# Patient Record
Sex: FEMALE | Race: WHITE | NOT HISPANIC OR LATINO | Employment: FULL TIME | URBAN - METROPOLITAN AREA
[De-identification: names, ages, dates, MRNs, and addresses within clinical notes are randomized per-mention and may not be internally consistent; named-entity substitution may affect disease eponyms.]

---

## 2017-02-22 DIAGNOSIS — Z32.01 POSITIVE PREGNANCY TEST: Primary | ICD-10-CM

## 2017-02-24 DIAGNOSIS — Z32.01 POSITIVE PREGNANCY TEST: ICD-10-CM

## 2017-02-24 LAB — B-HCG SERPL-ACNC: 1256 IU/L

## 2017-02-24 PROCEDURE — 84702 CHORIONIC GONADOTROPIN TEST: CPT | Performed by: OBSTETRICS & GYNECOLOGY

## 2017-02-24 PROCEDURE — 36415 COLL VENOUS BLD VENIPUNCTURE: CPT | Performed by: OBSTETRICS & GYNECOLOGY

## 2017-02-28 ENCOUNTER — TELEPHONE (OUTPATIENT)
Dept: OBGYN | Facility: CLINIC | Age: 37
End: 2017-02-28

## 2017-02-28 NOTE — TELEPHONE ENCOUNTER
Pt calling to discuss what she is going through with dealing with a miscarriage and now has blood in her urine, should she be seen ?  Pt would like to be seen soon can she be worked in?    Please call-     Bessy Harris  [facility] [place]

## 2017-02-28 NOTE — TELEPHONE ENCOUNTER
"Spoke with patient on the phone.    S-(situation): Patient reports she had a miscarriage on 2/19/17. Bleeding continued for about 7 days and became more like spotting on 2/26/17. Patient reports today she was out for a run and returned and noticed blood in her urine when voiding. Patient reports she wasn't sure if it was coming from her vagina or not so she placed a tampon. Patient reports she pushed fluids and voided 2 additional times with tampon in place, continued to see blood in her urine. Patient denies UTI symptoms. Patient denies fever. Patient denies flank pain, reports cramping/low back pain during the course of her miscarriage. Patient denies nausea or vomiting. Patient reports \" looser stools.\"    B-(background): US 2/14 confirmed missed AB    A-(assessment): hematuria per pt    R-(recommendations): Advised patient to be seen by PCP for further evaluation. Monitor for S & S of UTI or kidney infection/stones.    Patient reports understanding and in agreement.    Lor Rand   [facility]  RN        "

## 2017-03-01 ENCOUNTER — OFFICE VISIT (OUTPATIENT)
Dept: FAMILY MEDICINE | Facility: CLINIC | Age: 37
End: 2017-03-01
Payer: COMMERCIAL

## 2017-03-01 VITALS
HEIGHT: 63 IN | RESPIRATION RATE: 18 BRPM | DIASTOLIC BLOOD PRESSURE: 76 MMHG | BODY MASS INDEX: 18.43 KG/M2 | SYSTOLIC BLOOD PRESSURE: 120 MMHG | HEART RATE: 60 BPM | TEMPERATURE: 98.1 F | WEIGHT: 104 LBS

## 2017-03-01 DIAGNOSIS — O02.1 MISSED ABORTION: ICD-10-CM

## 2017-03-01 DIAGNOSIS — R31.9 BLOOD IN URINE: Primary | ICD-10-CM

## 2017-03-01 LAB
ALBUMIN UR-MCNC: NEGATIVE MG/DL
APPEARANCE UR: ABNORMAL
BACTERIA #/AREA URNS HPF: ABNORMAL /HPF
BILIRUB UR QL STRIP: NEGATIVE
COLOR UR AUTO: YELLOW
GLUCOSE UR STRIP-MCNC: NEGATIVE MG/DL
HGB UR QL STRIP: ABNORMAL
KETONES UR STRIP-MCNC: NEGATIVE MG/DL
LEUKOCYTE ESTERASE UR QL STRIP: NEGATIVE
NITRATE UR QL: NEGATIVE
PH UR STRIP: 7 PH (ref 5–7)
RBC #/AREA URNS AUTO: >100 /HPF (ref 0–2)
SP GR UR STRIP: 1.02 (ref 1–1.03)
URN SPEC COLLECT METH UR: ABNORMAL
UROBILINOGEN UR STRIP-ACNC: 0.2 EU/DL (ref 0.2–1)
WBC #/AREA URNS AUTO: ABNORMAL /HPF (ref 0–2)

## 2017-03-01 PROCEDURE — 99213 OFFICE O/P EST LOW 20 MIN: CPT | Performed by: PHYSICIAN ASSISTANT

## 2017-03-01 PROCEDURE — 81001 URINALYSIS AUTO W/SCOPE: CPT | Performed by: PHYSICIAN ASSISTANT

## 2017-03-01 PROCEDURE — 87086 URINE CULTURE/COLONY COUNT: CPT | Performed by: PHYSICIAN ASSISTANT

## 2017-03-01 ASSESSMENT — PAIN SCALES - GENERAL: PAINLEVEL: NO PAIN (0)

## 2017-03-01 NOTE — PATIENT INSTRUCTIONS
Will do urine culture to be sure no bladder infection  Still does not prove whether blood in urine v from miscarriage  Seems most likely from miscarriage - but only way to know would be to get cathed urine sample  Talk to your reproductive endocrinologist on Monday   Let me know if question of blood in urine continues

## 2017-03-01 NOTE — MR AVS SNAPSHOT
After Visit Summary   3/1/2017    María Briones    MRN: [id number]           Patient Information     Date Of Birth          1980        Visit Information        Provider Department      3/1/2017 8:00 AM Fay Petersen PA-C [facility]        Today's Diagnoses     Blood in urine    -  1    Missed           Care Instructions    Will do urine culture to be sure no bladder infection  Still does not prove whether blood in urine v from miscarriage  Seems most likely from miscarriage - but only way to know would be to get cathed urine sample  Talk to your reproductive endocrinologist on Monday   Let me know if question of blood in urine continues         Follow-ups after your visit        Who to contact     If you have questions or need follow up information about today's clinic visit or your schedule please contact [facility] directly at [phone].  Normal or non-critical lab and imaging results will be communicated to you by CoverMyMedshart, letter or phone within 4 business days after the clinic has received the results. If you do not hear from us within 7 days, please contact the clinic through CoverMyMedshart or phone. If you have a critical or abnormal lab result, we will notify you by phone as soon as possible.  Submit refill requests through [employer] or call your pharmacy and they will forward the refill request to us. Please allow 3 business days for your refill to be completed.          Additional Information About Your Visit        MyChart Information     [employer] gives you secure access to your electronic health record. If you see a primary care provider, you can also send messages to your care team and make appointments. If you have questions, please call your primary care clinic.  If you do not have a primary care provider, please call [phone] and they will assist you.        Care EveryWhere ID     This is your Care EveryWhere ID. This could be used  "by other organizations to access your [place] medical records  [id number]        Your Vitals Were     Pulse Temperature Respirations Height Last Period BMI (Body Mass Index)    60 98.1  F (36.7  C) (Tympanic) 18 5' 3.39\" (1.61 m) (LMP Unknown) 18.2 kg/m2       Blood Pressure from Last 3 Encounters:   03/01/17 120/76   10/24/16 127/79   11/23/15 127/76    Weight from Last 3 Encounters:   03/01/17 104 lb (47.2 kg)   10/24/16 105 lb 12.8 oz (48 kg)   11/23/15 106 lb 6.4 oz (48.3 kg)              We Performed the Following     *UA reflex to Microscopic and Culture ([facility] (except [place] and [place])     Urine Culture Aerobic Bacterial     Urine Microscopic        Primary Care Provider Office Phone # Fax #    Jayshree Garcia [phone]       [facility] [address] [facility] [place] [address]        Thank you!     Thank you for choosing [facility]  for your care. Our goal is always to provide you with excellent care. Hearing back from our patients is one way we can continue to improve our services. Please take a few minutes to complete the written survey that you may receive in the mail after your visit with us. Thank you!             Your Updated Medication List - Protect others around you: Learn how to safely use, store and throw away your medicines at www.disposemymeds.org.          This list is accurate as of: 3/1/17  8:36 AM.  Always use your most recent med list.                   Brand Name Dispense Instructions for use    ibuprofen 400 MG tablet    ADVIL/MOTRIN    60 tablet    Take 1-2 tablets (400-800 mg) by mouth every 6 hours as needed for other (cramping)       prenatal multivitamin  plus iron 27-0.8 MG Tabs per tablet      Take 1 tablet by mouth daily         "

## 2017-03-01 NOTE — PROGRESS NOTES
"  SUBJECTIVE:                                                    María Briones is a 36 year old female who presents to clinic today for the following health issues:      URINARY TRACT SYMPTOMS     Onset: Yesterday 2/28/17    Description:   Painful urination (Dysuria): no   Blood in urine (Hematuria): YES  Delay in urine (Hesitency): no     Intensity: mild    Progression of Symptoms:  Improving since yesterday    Accompanying Signs & Symptoms:  Fever/chills: no   Flank pain no  Nausea and vomiting: no   Any vaginal symptoms: none  Abdominal/Pelvic Pain: YES- Cramping   History:   History of frequent UTI's: YES- in the past  History of kidney stones: no   Sexually Active: YES  Possibility of pregnancy: Had a miscarriage last week    Precipitating factors:   NA         Therapies Tried and outcome:  Increase fluid intake    Has been working with reproductive endocrinologist.  Embryo transfer in Jan.  Thinks her missed ab was ~7 wks.  From telephone to OB yesterday: \"Patient reports she had a miscarriage on 2/19/17. Bleeding continued for about 7 days and became more like spotting on 2/26/17. Patient reports today she was out for a run and returned and noticed blood in her urine when voiding. Patient reports she wasn't sure if it was coming from her vagina or not so she placed a tampon. Patient reports she pushed fluids and voided 2 additional times with tampon in place, continued to see blood in her urine. Patient denies UTI symptoms. Patient denies fever. Patient denies flank pain, reports cramping/low back pain during the course of her miscarriage. Patient denies nausea or vomiting. Patient reports \" looser stools.\"    Is getting some back pain and cramping, less this week than last.  No urinary symptoms aside from ? Blood.  History of occasional UTI - last maybe 5 yrs ago, more freq in past.  No personal or fam history of kidney stones.      Problem list and histories reviewed & adjusted, as indicated.  Additional " history: as documented    Patient Active Problem List   Diagnosis     Irregular periods     Dysmenorrhea     Female infertility     Past Surgical History   Procedure Laterality Date     C oral surgery procedure       wisdom teeth     Laparoscopy diagnostic (gyn)  4/15/2014     Procedure: Operative Laparoscopy, Excision Cauterization Of Endometriosis, Chromopertubation ;  Surgeon: Mohamud Osorio MD;  [facility]     Laparoscopic ablation endometriosis  4/15/2014     Procedure: LAPAROSCOPIC ABLATION ENDOMETRIOSIS;  Surgeon: Mohamud Osorio MD;  [facility]     Laparoscopic tubal dye study  4/15/2014     Procedure: LAPAROSCOPIC TUBAL DYE STUDY;  Surgeon: Mohamud Osorio MD;  [facility]      section N/A 10/12/2015     Procedure:  SECTION;  Surgeon: Jayshree Garcia MD;  Location: [facility]       Social History   Substance Use Topics     Smoking status: Never Smoker     Smokeless tobacco: Never Used     Alcohol use Yes      Comment: occas- quit with pregnancy     Family History   Problem Relation Age of Onset     Lipids Father      Hypertension Father      Cardiovascular Father      A fib     CANCER Maternal Grandmother      ovarian      DIABETES Maternal Grandfather      HEART DISEASE Maternal Grandfather      CANCER Maternal Grandfather      skin     Cardiovascular Maternal Grandfather      MI     Thyroid Disease Brother      ?     CANCER Mother      leukemia     Bleeding Disorder Mother      anemia     Depression Mother      Cardiovascular Paternal Grandfather      MI     Genitourinary Problems Paternal Grandmother      kidney removed           Reviewed and updated as needed this visit by clinical staff       Reviewed and updated as needed this visit by Provider         ROS:  Constitutional, , musculoskeletal, and psych systems are negative, except as otherwise noted.    OBJECTIVE:                                                    /76 (BP Location: Right arm, Patient Position:  "Chair, Cuff Size: Adult Regular)  Pulse 60  Temp 98.1  F (36.7  C) (Tympanic)  Resp 18  Ht 5' 3.39\" (1.61 m)  Wt 104 lb (47.2 kg)  LMP  (LMP Unknown)  BMI 18.2 kg/m2  Body mass index is 18.2 kg/(m^2).  GENERAL: healthy, alert and no distress    UA: RBC >100, otherwise neg     ASSESSMENT/PLAN:                                                        ICD-10-CM    1. ? Blood in urine R31.9 *UA reflex to Microscopic and Culture ([facility] (except [place] and [place])     Urine Microscopic     Urine Culture Aerobic Bacterial   2. Missed  O02.1        Patient Instructions   Will do urine culture to be sure no bladder infection  Still does not prove whether blood in urine v from miscarriage  Seems most likely from miscarriage - but only way to know would be to get cathed urine sample  Talk to your reproductive endocrinologist on Monday   Let me know if question of blood in urine continues       Fay Petersen, PADaisyC  [facility]  "

## 2017-03-03 LAB
BACTERIA SPEC CULT: NORMAL
MICRO REPORT STATUS: NORMAL
SPECIMEN SOURCE: NORMAL

## 2017-03-03 NOTE — PROGRESS NOTES
CMA- order future UA with micro reflex.    Juana Daniel,    Your urine culture showed NO infection.  I still the blood is likely residual from your miscarriage, rather than truly in/from your urine.  Discuss with your reproductive endocrinologist.  We can repeat urine test in a few weeks to prove no further blood in urine - space well apart from your periods.     Please call clinic at [phone] if you have questions.    Fay Petersen PA-C

## 2017-03-20 DIAGNOSIS — Z32.01 PREGNANCY TEST-POSITIVE: Primary | ICD-10-CM

## 2017-03-20 LAB — B-HCG SERPL-ACNC: 67 IU/L (ref 0–5)

## 2017-03-20 PROCEDURE — 84702 CHORIONIC GONADOTROPIN TEST: CPT

## 2017-03-20 PROCEDURE — 36415 COLL VENOUS BLD VENIPUNCTURE: CPT

## 2017-04-03 DIAGNOSIS — Z32.00 PREGNANCY EXAMINATION OR TEST, PREGNANCY UNCONFIRMED: Primary | ICD-10-CM

## 2017-04-03 LAB — B-HCG SERPL-ACNC: <1 IU/L (ref 0–5)

## 2017-04-03 PROCEDURE — 36415 COLL VENOUS BLD VENIPUNCTURE: CPT | Performed by: FAMILY MEDICINE

## 2017-04-03 PROCEDURE — 84702 CHORIONIC GONADOTROPIN TEST: CPT | Performed by: FAMILY MEDICINE

## 2017-04-28 ENCOUNTER — OFFICE VISIT (OUTPATIENT)
Dept: FAMILY MEDICINE | Facility: CLINIC | Age: 37
End: 2017-04-28
Payer: COMMERCIAL

## 2017-04-28 VITALS
OXYGEN SATURATION: 99 % | HEIGHT: 63 IN | BODY MASS INDEX: 19.35 KG/M2 | DIASTOLIC BLOOD PRESSURE: 57 MMHG | SYSTOLIC BLOOD PRESSURE: 137 MMHG | HEART RATE: 67 BPM | WEIGHT: 109.2 LBS | TEMPERATURE: 99.4 F

## 2017-04-28 DIAGNOSIS — N63.15 BREAST LUMP ON RIGHT SIDE AT 12 O'CLOCK POSITION: Primary | ICD-10-CM

## 2017-04-28 DIAGNOSIS — N63.24 BREAST LUMP ON LEFT SIDE AT 7 O'CLOCK POSITION: ICD-10-CM

## 2017-04-28 PROCEDURE — 99213 OFFICE O/P EST LOW 20 MIN: CPT | Performed by: INTERNAL MEDICINE

## 2017-04-28 NOTE — PROGRESS NOTES
"  SUBJECTIVE:                                                    María Briones is a 36 year old female who presents to clinic today for the following health issues:  Chief Complaint   Patient presents with     Mass     x 2.5 weeks, dense tissue in right breast      Breast change       Duration: x 2.5 weeks     Description (location/character/radiation): Patient reports noticing a difference when doing self exam -  right breast, at 12 o'clock, breast tissue seems more dense, no lump, no pain    Intensity:  mild    Accompanying signs and symptoms: none     History (similar episodes/previous evaluation): None    Precipitating or alleviating factors: None    Therapies tried and outcome: None     María is currently undergoing fertility treatment and wonders if this may be contributing.       Problem list and histories reviewed & adjusted, as indicated.  Additional history: as documented    Current Outpatient Prescriptions   Medication Sig Dispense Refill     Desogestrel-Ethinyl Estradiol (RECLIPSEN PO)        Prenatal Vit-Fe Fumarate-FA (PRENATAL MULTIVITAMIN  PLUS IRON) 27-0.8 MG TABS Take 1 tablet by mouth daily       ibuprofen (ADVIL,MOTRIN) 400 MG tablet Take 1-2 tablets (400-800 mg) by mouth every 6 hours as needed for other (cramping) (Patient not taking: Reported on 3/1/2017) 60 tablet 0       Reviewed and updated as needed this visit by clinical staff  Tobacco  Allergies  Med Hx  Surg Hx  Fam Hx  Soc Hx      Reviewed and updated as needed this visit by Provider           OBJECTIVE:                                                    /57 (BP Location: Left arm, Patient Position: Chair, Cuff Size: Adult Regular)  Pulse 67  Temp 99.4  F (37.4  C) (Tympanic)  Ht 5' 3\" (1.6 m)  Wt 109 lb 3.2 oz (49.5 kg)  SpO2 99%  BMI 19.34 kg/m2  Body mass index is 19.34 kg/(m^2).  GENERAL: healthy, alert and no distress  BREAST: thickened tissues in right breast at 12 o'clock and left breast at 7 o'clock both a " couple cm in size and adjacent to the nipple and no tenderness or nipple discharge and no palpable axillary masses or adenopathy         ASSESSMENT/PLAN:                                                        1. Breast lump on right side at 12 o'clock position  2. Breast lump on left side at 7 o'clock position    María presents with two breast lumps as noted above.  Will proceed with mammogram and ultrasound of both areas.      - MA Diagnostic Digital Bilateral; Future  - US Breast Bilateral Limited 1-3 Quadrants; Future      NohemyWilla Flanagan MD  [facility]

## 2017-04-28 NOTE — NURSING NOTE
"Chief Complaint   Patient presents with     Mass     x 2.5 weeks, dense tissue in right breast        Initial /57 (BP Location: Left arm, Patient Position: Chair, Cuff Size: Adult Regular)  Pulse 67  Temp 99.4  F (37.4  C) (Tympanic)  Ht 5' 3\" (1.6 m)  Wt 109 lb 3.2 oz (49.5 kg)  SpO2 99%  BMI 19.34 kg/m2 Estimated body mass index is 19.34 kg/(m^2) as calculated from the following:    Height as of this encounter: 5' 3\" (1.6 m).    Weight as of this encounter: 109 lb 3.2 oz (49.5 kg).  Medication Reconciliation: complete   Radha GE CMA (AAMA)    "

## 2017-04-28 NOTE — MR AVS SNAPSHOT
After Visit Summary   4/28/2017    María Briones    MRN: [id number]           Patient Information     Date Of Birth          1980        Visit Information        Provider Department      4/28/2017 11:20 AM Elian Flanagan MD [facility]        Today's Diagnoses     Breast lump on right side at 12 o'clock position    -  1    Breast lump on left side at 7 o'clock position           Follow-ups after your visit        Future tests that were ordered for you today     Open Future Orders        Priority Expected Expires Ordered    MA Diagnostic Digital Bilateral Routine  4/28/2018 4/28/2017    US Breast Bilateral Limited 1-3 Quadrants Routine  4/28/2018 4/28/2017            Who to contact     If you have questions or need follow up information about today's clinic visit or your schedule please contact [facility] directly at [phone].  Normal or non-critical lab and imaging results will be communicated to you by MyChart, letter or phone within 4 business days after the clinic has received the results. If you do not hear from us within 7 days, please contact the clinic through [employer] or phone. If you have a critical or abnormal lab result, we will notify you by phone as soon as possible.  Submit refill requests through [employer] or call your pharmacy and they will forward the refill request to us. Please allow 3 business days for your refill to be completed.          Additional Information About Your Visit        MyChart Information     [employer] gives you secure access to your electronic health record. If you see a primary care provider, you can also send messages to your care team and make appointments. If you have questions, please call your primary care clinic.  If you do not have a primary care provider, please call [phone] and they will assist you.        Care EveryWhere ID     This is your Care EveryWhere ID. This could be used by other organizations to access your  "[place] medical records  [id number]        Your Vitals Were     Pulse Temperature Height Pulse Oximetry BMI (Body Mass Index)       67 99.4  F (37.4  C) (Tympanic) 5' 3\" (1.6 m) 99% 19.34 kg/m2        Blood Pressure from Last 3 Encounters:   04/28/17 137/57   03/01/17 120/76   10/24/16 127/79    Weight from Last 3 Encounters:   04/28/17 109 lb 3.2 oz (49.5 kg)   03/01/17 104 lb (47.2 kg)   10/24/16 105 lb 12.8 oz (48 kg)               Primary Care Provider Office Phone # Fax #    Jayshree Garcia [phone]       [facility] [address]  [facility] [place] [address]        Thank you!     Thank you for choosing [facility]  for your care. Our goal is always to provide you with excellent care. Hearing back from our patients is one way we can continue to improve our services. Please take a few minutes to complete the written survey that you may receive in the mail after your visit with us. Thank you!             Your Updated Medication List - Protect others around you: Learn how to safely use, store and throw away your medicines at www.disposemymeds.org.          This list is accurate as of: 4/28/17 11:46 AM.  Always use your most recent med list.                   Brand Name Dispense Instructions for use    ibuprofen 400 MG tablet    ADVIL/MOTRIN    60 tablet    Take 1-2 tablets (400-800 mg) by mouth every 6 hours as needed for other (cramping)       prenatal multivitamin  plus iron 27-0.8 MG Tabs per tablet      Take 1 tablet by mouth daily       RECLIPSEN PO            "

## 2017-05-09 ENCOUNTER — HOSPITAL ENCOUNTER (OUTPATIENT)
Dept: ULTRASOUND IMAGING | Facility: CLINIC | Age: 37
End: 2017-05-09
Attending: INTERNAL MEDICINE
Payer: COMMERCIAL

## 2017-05-09 ENCOUNTER — HOSPITAL ENCOUNTER (OUTPATIENT)
Dept: MAMMOGRAPHY | Facility: CLINIC | Age: 37
Discharge: HOME | End: 2017-05-09
Attending: INTERNAL MEDICINE | Admitting: INTERNAL MEDICINE
Payer: COMMERCIAL

## 2017-05-09 DIAGNOSIS — N63.15 BREAST LUMP ON RIGHT SIDE AT 12 O'CLOCK POSITION: ICD-10-CM

## 2017-05-09 DIAGNOSIS — N63.24 BREAST LUMP ON LEFT SIDE AT 7 O'CLOCK POSITION: ICD-10-CM

## 2017-05-09 PROCEDURE — G0204 DX MAMMO INCL CAD BI: HCPCS

## 2017-05-09 PROCEDURE — 76642 ULTRASOUND BREAST LIMITED: CPT | Mod: 50

## 2017-12-22 DIAGNOSIS — Z32.01 PREGNANCY EXAMINATION OR TEST, POSITIVE RESULT: Primary | ICD-10-CM

## 2017-12-27 ENCOUNTER — OFFICE VISIT (OUTPATIENT)
Dept: DERMATOLOGY | Facility: CLINIC | Age: 37
End: 2017-12-27
Payer: COMMERCIAL

## 2017-12-27 VITALS — OXYGEN SATURATION: 99 % | HEART RATE: 89 BPM | DIASTOLIC BLOOD PRESSURE: 73 MMHG | SYSTOLIC BLOOD PRESSURE: 128 MMHG

## 2017-12-27 DIAGNOSIS — B36.0 TV (TINEA VERSICOLOR): ICD-10-CM

## 2017-12-27 DIAGNOSIS — Z32.01 PREGNANCY EXAMINATION OR TEST, POSITIVE RESULT: ICD-10-CM

## 2017-12-27 DIAGNOSIS — L81.4 LENTIGO: ICD-10-CM

## 2017-12-27 DIAGNOSIS — D22.9 BENIGN NEVUS: ICD-10-CM

## 2017-12-27 DIAGNOSIS — D48.5 NEOPLASM OF UNCERTAIN BEHAVIOR OF SKIN: Primary | ICD-10-CM

## 2017-12-27 DIAGNOSIS — L81.1 MELASMA: ICD-10-CM

## 2017-12-27 LAB — B-HCG SERPL-ACNC: 14 IU/L (ref 0–5)

## 2017-12-27 PROCEDURE — 36415 COLL VENOUS BLD VENIPUNCTURE: CPT | Performed by: OBSTETRICS & GYNECOLOGY

## 2017-12-27 PROCEDURE — 84702 CHORIONIC GONADOTROPIN TEST: CPT | Performed by: OBSTETRICS & GYNECOLOGY

## 2017-12-27 PROCEDURE — 88305 TISSUE EXAM BY PATHOLOGIST: CPT | Performed by: PHYSICIAN ASSISTANT

## 2017-12-27 PROCEDURE — 11100 HC BIOPSY SKIN/SUBQ/MUC MEM, SINGLE LESION: CPT | Performed by: PHYSICIAN ASSISTANT

## 2017-12-27 PROCEDURE — 99203 OFFICE O/P NEW LOW 30 MIN: CPT | Mod: 25 | Performed by: PHYSICIAN ASSISTANT

## 2017-12-27 RX ORDER — CICLOPIROX OLAMINE 7.7 MG/G
CREAM TOPICAL
Qty: 30 G | Refills: 4 | Status: SHIPPED | OUTPATIENT
Start: 2017-12-27 | End: 2018-03-30

## 2017-12-27 NOTE — LETTER
2017         RE: María Briones  [address] [address] [address]        Dear Colleague,    Thank you for referring your patient, María Briones, to the [facility]. Please see a copy of my visit note below.    María Briones is a 37 year old year old female patient here today for skin check.  Patient reports that she has had a mole on right forearm that will get irritated with her daughter picking at spot. She also developed brownish pigment on forehead during her last pregnancy. She also notes a rash on her abdomen, but it doesn't itch. She is currently pregnant. Patient has no other skin complaints today.  Remainder of the HPI, Meds, PMH, Allergies, FH, and SH was reviewed in chart.    Pertinent Hx:  No personal history of skin cancer.   Past Medical History:   Diagnosis Date     Chickenpox      Palpitations 2008     Urinary tract bacterial infections     as chold       Past Surgical History:   Procedure Laterality Date     C ORAL SURGERY PROCEDURE      wisdom teeth      SECTION N/A 10/12/2015    Procedure:  SECTION;  Surgeon: Jayshree Garcia MD;  Location: [facility]     LAPAROSCOPIC ABLATION ENDOMETRIOSIS  4/15/2014    Procedure: LAPAROSCOPIC ABLATION ENDOMETRIOSIS;  Surgeon: Mohamud Osorio MD;  [facility]     LAPAROSCOPIC TUBAL DYE STUDY  4/15/2014    Procedure: LAPAROSCOPIC TUBAL DYE STUDY;  Surgeon: Mohamud Osorio MD;  [facility]     LAPAROSCOPY DIAGNOSTIC (GYN)  4/15/2014    Procedure: Operative Laparoscopy, Excision Cauterization Of Endometriosis, Chromopertubation ;  Surgeon: Mohamud Osorio MD;  [facility]        Family History   Problem Relation Age of Onset     Lipids Father      Hypertension Father      Cardiovascular Father      A fib     CANCER Maternal Grandmother      ovarian      DIABETES Maternal Grandfather      HEART DISEASE Maternal Grandfather      CANCER Maternal Grandfather      skin     Cardiovascular Maternal  Grandfather      MI     Skin Cancer Maternal Grandfather      Thyroid Disease Brother      ?     CANCER Mother      leukemia     Bleeding Disorder Mother      anemia     Depression Mother      Cardiovascular Paternal Grandfather      MI     Genitourinary Problems Paternal Grandmother      kidney removed     Skin Cancer Maternal Aunt        Social History     Social History     Marital status:      Spouse name: N/A     Number of children: N/A     Years of education: N/A     Occupational History     Not on file.     Social History Main Topics     Smoking status: Never Smoker     Smokeless tobacco: Never Used     Alcohol use Yes      Comment: occas- quit with pregnancy     Drug use: No     Sexual activity: Yes     Partners: Male      Comment:  since 2005     Other Topics Concern     Parent/Sibling W/ Cabg, Mi Or Angioplasty Before 65f 55m? No     Social History Narrative       Outpatient Encounter Prescriptions as of 12/27/2017   Medication Sig Dispense Refill     Estradiol (ESTRACE PO)        ciclopirox (LOPROX) 0.77 % cream Apply twice daily to rash on abdomen for 2-3 weeks. 30 g 4     Prenatal Vit-Fe Fumarate-FA (PRENATAL MULTIVITAMIN  PLUS IRON) 27-0.8 MG TABS Take 1 tablet by mouth daily       Desogestrel-Ethinyl Estradiol (RECLIPSEN PO)        ibuprofen (ADVIL,MOTRIN) 400 MG tablet Take 1-2 tablets (400-800 mg) by mouth every 6 hours as needed for other (cramping) (Patient not taking: Reported on 3/1/2017) 60 tablet 0     No facility-administered encounter medications on file as of 12/27/2017.              Review Of Systems  Skin: As above  Eyes: negative  Ears/Nose/Throat: negative  Respiratory: No shortness of breath, dyspnea on exertion, cough, or hemoptysis  Cardiovascular: negative  Gastrointestinal: negative  Genitourinary: negative  Musculoskeletal: negative  Neurologic: negative  Psychiatric: negative  Hematologic/Lymphatic/Immunologic: negative  Endocrine: negative      O:   NAD, WDWN,  Alert & Oriented, Mood & Affect wnl, Vitals stable   Here today alone   /73  Pulse 89  SpO2 99%   General appearance normal   Vitals stable   Alert, oriented and in no acute distress      Brown papules and macules with regular pigment network and borders on torso and extremities   Brown macules on face and arms   Pink scaly thin well demarcated plaque   Thin brown patches on bilateral side of forehead  0.3 flesh colored papule on right elbow       The remainder of the detailed exam was unremarkable; the following areas were examined:  scalp/hair, conjunctiva/lids, face, neck, lips/teeth, oral mucosa/gingiva, chest, back, abdomen, buttocks, digits/nails, RUE, LUE, RLE, LLE.      Eyes: Conjunctivae/lids:Normal     ENT: Lips    MSK:Normal    Cardiovascular: peripheral edema none    Pulm: Breathing Normal    Neuro/Psych: Orientation:Normal; Mood/Affect:Normal    A/P:  1. R/O dermal nevus on right elbow  TANGENTIAL BIOPSY SENT OUT:  After consent, anesthesia with Lidocaine without epi and prep, tangential excision performed and specimen sent out for permanent section histology.  No complications and routine wound care. Patient told to call our office in 1-2 weeks for result.      2. Pityriasis Versicolor  Apply ciclopirox cream twice daily rash on abdomen for 2-3 week.   3. Melasma   Discussed to wear daily sunscreen.   If wanting after she is done with pregnancy and discuss creams to help lighten brown areas on skin.   4. Benign nevi, lentigo   BENIGN LESIONS DISCUSSED WITH PATIENT:  I discussed the specifics of tumor, prognosis, and genetics of benign lesions.  I explained that treatment of these lesions would be purely cosmetic and not medically neccessary.  I discussed with patient different removal options including excision, cautery and /or laser.      Nature and genetics of benign skin lesions dicussed with patient.  Signs and Symptoms of skin cancer discussed with patient.  ABCDEs of melanoma reviewed  with patient.  Patient encouraged to perform monthly skin exams.  UV precautions reviewed with patient.  Risks of non-melanoma skin cancer discussed with patient   Return to clinic one year or sooner if needed.     Again, thank you for allowing me to participate in the care of your patient.        Sincerely,        Susan Ballard PA-C

## 2017-12-27 NOTE — PATIENT INSTRUCTIONS
Wound Care Instructions     FOR SUPERFICIAL WOUNDS     [facility] [phone]    [facility] [phone]                     AFTER 24 HOURS YOU SHOULD REMOVE THE BANDAGE AND BEGIN DAILY DRESSING CHANGES AS FOLLOWS:     1) Remove Dressing.     2) Clean and dry the area with tap water using a Q-tip or sterile gauze pad.     3) Apply Vaseline, Aquaphor, Polysporin ointment or Bacitracin ointment over entire wound.  Do NOT use Neosporin ointment.     4) Cover the wound with a band-aid, or a sterile non-stick gauze pad and micropore paper tape      REPEAT THESE INSTRUCTIONS AT LEAST ONCE A DAY UNTIL THE WOUND HAS COMPLETELY HEALED.    It is an old wives tale that a wound heals better when it is exposed to air and allowed to dry out. The wound will heal faster with a better cosmetic result if it is kept moist with ointment and covered with a bandage.    **Do not let the wound dry out.**      Supplies Needed:      *Cotton tipped applicators (Q-tips)    *Polysporin Ointment or Bacitracin Ointment (NOT NEOSPORIN)    *Band-aids or non-stick gauze pads and micropore paper tape.      PATIENT INFORMATION:    During the healing process you will notice a number of changes. All wounds develop a small halo of redness surrounding the wound.  This means healing is occurring. Severe itching with extensive redness usually indicates sensitivity to the ointment or bandage tape used to dress the wound.  You should call our office if this develops.      Swelling  and/or discoloration around your surgical site is common, particularly when performed around the eye.    All wounds normally drain.  The larger the wound the more drainage there will be.  After 7-10 days, you will notice the wound beginning to shrink and new skin will begin to grow.  The wound is healed when you can see skin has formed over the entire area.  A healed wound has a healthy, shiny look to the surface and is red to dark pink in color to  normalize.  Wounds may take approximately 4-6 weeks to heal.  Larger wounds may take 6-8 weeks.  After the wound is healed you may discontinue dressing changes.    You may experience a sensation of tightness as your wound heals. This is normal and will gradually subside.    Your healed wound may be sensitive to temperature changes. This sensitivity improves with time, but if you re having a lot of discomfort, try to avoid temperature extremes.    Patients frequently experience itching after their wound appears to have healed because of the continue healing under the skin.  Plain Vaseline will help relieve the itching.        POSSIBLE COMPLICATIONS    BLEEDIN. Leave the bandage in place.  2. Use tightly rolled up gauze or a cloth to apply direct pressure over the bandage for 30  minutes.  3. Reapply pressure for an additional 30 minutes if necessary  4. Use additional gauze and tape to maintain pressure once the bleeding has stopped.

## 2017-12-27 NOTE — PROGRESS NOTES
María Briones is a 37 year old year old female patient here today for skin check.  Patient reports that she has had a mole on right forearm that will get irritated with her daughter picking at spot. She also developed brownish pigment on forehead during her last pregnancy. She also notes a rash on her abdomen, but it doesn't itch. She is currently pregnant. Patient has no other skin complaints today.  Remainder of the HPI, Meds, PMH, Allergies, FH, and SH was reviewed in chart.    Pertinent Hx:  No personal history of skin cancer.   Past Medical History:   Diagnosis Date     Chickenpox      Palpitations 2008     Urinary tract bacterial infections     as chold       Past Surgical History:   Procedure Laterality Date     C ORAL SURGERY PROCEDURE      wisdom teeth      SECTION N/A 10/12/2015    Procedure:  SECTION;  Surgeon: Jayshree Garcia MD;  Location: [facility]     LAPAROSCOPIC ABLATION ENDOMETRIOSIS  4/15/2014    Procedure: LAPAROSCOPIC ABLATION ENDOMETRIOSIS;  Surgeon: Mohamud Osorio MD;  Location: [facility]     LAPAROSCOPIC TUBAL DYE STUDY  4/15/2014    Procedure: LAPAROSCOPIC TUBAL DYE STUDY;  Surgeon: Mohamud Osorio MD;  Location: [facility]     LAPAROSCOPY DIAGNOSTIC (GYN)  4/15/2014    Procedure: Operative Laparoscopy, Excision Cauterization Of Endometriosis, Chromopertubation ;  Surgeon: Mohamud Osorio MD;  Location: [facility] OR        Family History   Problem Relation Age of Onset     Lipids Father      Hypertension Father      Cardiovascular Father      A fib     CANCER Maternal Grandmother      ovarian      DIABETES Maternal Grandfather      HEART DISEASE Maternal Grandfather      CANCER Maternal Grandfather      skin     Cardiovascular Maternal Grandfather      MI     Skin Cancer Maternal Grandfather      Thyroid Disease Brother      ?     CANCER Mother      leukemia     Bleeding Disorder Mother      anemia     Depression Mother      Cardiovascular Paternal Grandfather      MI      Genitourinary Problems Paternal Grandmother      kidney removed     Skin Cancer Maternal Aunt        Social History     Social History     Marital status:      Spouse name: N/A     Number of children: N/A     Years of education: N/A     Occupational History     Not on file.     Social History Main Topics     Smoking status: Never Smoker     Smokeless tobacco: Never Used     Alcohol use Yes      Comment: occas- quit with pregnancy     Drug use: No     Sexual activity: Yes     Partners: Male      Comment:  since 2005     Other Topics Concern     Parent/Sibling W/ Cabg, Mi Or Angioplasty Before 65f 55m? No     Social History Narrative       Outpatient Encounter Prescriptions as of 12/27/2017   Medication Sig Dispense Refill     Estradiol (ESTRACE PO)        ciclopirox (LOPROX) 0.77 % cream Apply twice daily to rash on abdomen for 2-3 weeks. 30 g 4     Prenatal Vit-Fe Fumarate-FA (PRENATAL MULTIVITAMIN  PLUS IRON) 27-0.8 MG TABS Take 1 tablet by mouth daily       Desogestrel-Ethinyl Estradiol (RECLIPSEN PO)        ibuprofen (ADVIL,MOTRIN) 400 MG tablet Take 1-2 tablets (400-800 mg) by mouth every 6 hours as needed for other (cramping) (Patient not taking: Reported on 3/1/2017) 60 tablet 0     No facility-administered encounter medications on file as of 12/27/2017.              Review Of Systems  Skin: As above  Eyes: negative  Ears/Nose/Throat: negative  Respiratory: No shortness of breath, dyspnea on exertion, cough, or hemoptysis  Cardiovascular: negative  Gastrointestinal: negative  Genitourinary: negative  Musculoskeletal: negative  Neurologic: negative  Psychiatric: negative  Hematologic/Lymphatic/Immunologic: negative  Endocrine: negative      O:   NAD, WDWN, Alert & Oriented, Mood & Affect wnl, Vitals stable   Here today alone   /73  Pulse 89  SpO2 99%   General appearance normal   Vitals stable   Alert, oriented and in no acute distress      Brown papules and macules with regular pigment  network and borders on torso and extremities   Brown macules on face and arms   Pink scaly thin well demarcated plaque   Thin brown patches on bilateral side of forehead  0.3 flesh colored papule on right elbow       The remainder of the detailed exam was unremarkable; the following areas were examined:  scalp/hair, conjunctiva/lids, face, neck, lips/teeth, oral mucosa/gingiva, chest, back, abdomen, buttocks, digits/nails, RUE, LUE, RLE, LLE.      Eyes: Conjunctivae/lids:Normal     ENT: Lips    MSK:Normal    Cardiovascular: peripheral edema none    Pulm: Breathing Normal    Neuro/Psych: Orientation:Normal; Mood/Affect:Normal    A/P:  1. R/O dermal nevus on right elbow  TANGENTIAL BIOPSY SENT OUT:  After consent, anesthesia with Lidocaine without epi and prep, tangential excision performed and specimen sent out for permanent section histology.  No complications and routine wound care. Patient told to call our office in 1-2 weeks for result.      2. Pityriasis Versicolor  Apply ciclopirox cream twice daily rash on abdomen for 2-3 week.   3. Melasma   Discussed to wear daily sunscreen.   If wanting after she is done with pregnancy and discuss creams to help lighten brown areas on skin.   4. Benign nevi, lentigo   BENIGN LESIONS DISCUSSED WITH PATIENT:  I discussed the specifics of tumor, prognosis, and genetics of benign lesions.  I explained that treatment of these lesions would be purely cosmetic and not medically neccessary.  I discussed with patient different removal options including excision, cautery and /or laser.      Nature and genetics of benign skin lesions dicussed with patient.  Signs and Symptoms of skin cancer discussed with patient.  ABCDEs of melanoma reviewed with patient.  Patient encouraged to perform monthly skin exams.  UV precautions reviewed with patient.  Risks of non-melanoma skin cancer discussed with patient   Return to clinic one year or sooner if needed.

## 2017-12-27 NOTE — MR AVS SNAPSHOT
After Visit Summary   12/27/2017    María Brionse    MRN: [id number]           Patient Information     Date Of Birth          1980        Visit Information        Provider Department      12/27/2017 8:20 AM Susan Medeiros PA-C [facility]        Care Instructions        Wound Care Instructions     FOR SUPERFICIAL WOUNDS     [facility] [phone]    [facility] [phone]                     AFTER 24 HOURS YOU SHOULD REMOVE THE BANDAGE AND BEGIN DAILY DRESSING CHANGES AS FOLLOWS:     1) Remove Dressing.     2) Clean and dry the area with tap water using a Q-tip or sterile gauze pad.     3) Apply Vaseline, Aquaphor, Polysporin ointment or Bacitracin ointment over entire wound.  Do NOT use Neosporin ointment.     4) Cover the wound with a band-aid, or a sterile non-stick gauze pad and micropore paper tape      REPEAT THESE INSTRUCTIONS AT LEAST ONCE A DAY UNTIL THE WOUND HAS COMPLETELY HEALED.    It is an old wives tale that a wound heals better when it is exposed to air and allowed to dry out. The wound will heal faster with a better cosmetic result if it is kept moist with ointment and covered with a bandage.    **Do not let the wound dry out.**      Supplies Needed:      *Cotton tipped applicators (Q-tips)    *Polysporin Ointment or Bacitracin Ointment (NOT NEOSPORIN)    *Band-aids or non-stick gauze pads and micropore paper tape.      PATIENT INFORMATION:    During the healing process you will notice a number of changes. All wounds develop a small halo of redness surrounding the wound.  This means healing is occurring. Severe itching with extensive redness usually indicates sensitivity to the ointment or bandage tape used to dress the wound.  You should call our office if this develops.      Swelling  and/or discoloration around your surgical site is common, particularly when performed around the eye.    All wounds normally drain.  The larger the  wound the more drainage there will be.  After 7-10 days, you will notice the wound beginning to shrink and new skin will begin to grow.  The wound is healed when you can see skin has formed over the entire area.  A healed wound has a healthy, shiny look to the surface and is red to dark pink in color to normalize.  Wounds may take approximately 4-6 weeks to heal.  Larger wounds may take 6-8 weeks.  After the wound is healed you may discontinue dressing changes.    You may experience a sensation of tightness as your wound heals. This is normal and will gradually subside.    Your healed wound may be sensitive to temperature changes. This sensitivity improves with time, but if you re having a lot of discomfort, try to avoid temperature extremes.    Patients frequently experience itching after their wound appears to have healed because of the continue healing under the skin.  Plain Vaseline will help relieve the itching.        POSSIBLE COMPLICATIONS    BLEEDIN. Leave the bandage in place.  2. Use tightly rolled up gauze or a cloth to apply direct pressure over the bandage for 30  minutes.  3. Reapply pressure for an additional 30 minutes if necessary  4. Use additional gauze and tape to maintain pressure once the bleeding has stopped.            Follow-ups after your visit        Your next 10 appointments already scheduled     Dec 29, 2017  7:50 AM CST   LAB with [facility] ([facility])    [address] [id number] [phone]           Please do not eat 10-12 hours before your appointment if you are coming in fasting for labs on lipids, cholesterol, or glucose (sugar). This does not apply to pregnant women. Water, hot tea and black coffee (with nothing added) are okay. Do not drink other fluids, diet soda or chew gum.              Who to contact     If you have questions or need follow up information about today's clinic visit or your schedule please  contact [facility] directly at [phone].  Normal or non-critical lab and imaging results will be communicated to you by MyChart, letter or phone within 4 business days after the clinic has received the results. If you do not hear from us within 7 days, please contact the clinic through MyChart or phone. If you have a critical or abnormal lab result, we will notify you by phone as soon as possible.  Submit refill requests through [email or web address] or call your pharmacy and they will forward the refill request to us. Please allow 3 business days for your refill to be completed.          Additional Information About Your Visit        [employer] Information     [email or web address] gives you secure access to your electronic health record. If you see a primary care provider, you can also send messages to your care team and make appointments. If you have questions, please call your primary care clinic.  If you do not have a primary care provider, please call [phone] and they will assist you.        Care EveryWhere ID     This is your Care EveryWhere ID. This could be used by other organizations to access your [place] medical records  [id number]        Your Vitals Were     Pulse Pulse Oximetry                89 99%           Blood Pressure from Last 3 Encounters:   12/27/17 128/73   04/28/17 137/57   03/01/17 120/76    Weight from Last 3 Encounters:   04/28/17 49.5 kg (109 lb 3.2 oz)   03/01/17 47.2 kg (104 lb)   10/24/16 48 kg (105 lb 12.8 oz)              Today, you had the following     No orders found for display       Primary Care Provider Office Phone # Fax #    Jayshree Garcia [phone] [address] [address]        Equal Access to Services     SHERLYN QURESHI : Hadii mando Jaeger, eleazar mcguire, qajenise kaalmajuan tabares. So [facility] [phone].    ATENCIÓN: Si habla español, tiene a laird disposición servicios gratuitos de  asistencia lingüística. Shaka al [phone].    We comply with applicable federal civil rights laws and [place] laws. We do not discriminate on the basis of race, color, national origin, age, disability, sex, sexual orientation, or gender identity.            Thank you!     Thank you for choosing [facility]  for your care. Our goal is always to provide you with excellent care. Hearing back from our patients is one way we can continue to improve our services. Please take a few minutes to complete the written survey that you may receive in the mail after your visit with us. Thank you!             Your Updated Medication List - Protect others around you: Learn how to safely use, store and throw away your medicines at www.disposemymeds.org.          This list is accurate as of: 17  8:23 AM.  Always use your most recent med list.                   Brand Name Dispense Instructions for use Diagnosis    ESTRACE PO           ibuprofen 400 MG tablet    ADVIL/MOTRIN    60 tablet    Take 1-2 tablets (400-800 mg) by mouth every 6 hours as needed for other (cramping)    S/P  section       prenatal multivitamin plus iron 27-0.8 MG Tabs per tablet      Take 1 tablet by mouth daily        RECLIPSEN PO

## 2017-12-27 NOTE — NURSING NOTE
"Chief Complaint   Patient presents with     Derm Problem     skin check       Initial /73  Pulse 89  SpO2 99% Estimated body mass index is 19.34 kg/(m^2) as calculated from the following:    Height as of 4/28/17: 1.6 m (5' 3\").    Weight as of 4/28/17: 49.5 kg (109 lb 3.2 oz).  BP completed using cuff size: laura Mccoy LPN    "

## 2018-01-02 LAB — COPATH REPORT: NORMAL

## 2018-01-30 DIAGNOSIS — Z13.29 SCREENING FOR THYROID DISORDER: ICD-10-CM

## 2018-01-30 DIAGNOSIS — Z11.59 SCREENING EXAMINATION FOR POLIOMYELITIS: ICD-10-CM

## 2018-01-30 DIAGNOSIS — Z13.228 SCREENING FOR PHENYLKETONURIA (PKU): ICD-10-CM

## 2018-01-30 DIAGNOSIS — Z01.84 IMMUNITY STATUS TESTING: Primary | ICD-10-CM

## 2018-01-30 DIAGNOSIS — Z13.0 SCREENING FOR IRON DEFICIENCY ANEMIA: ICD-10-CM

## 2018-01-30 DIAGNOSIS — Z13.21 SCREENING FOR MALNUTRITION: ICD-10-CM

## 2018-01-30 DIAGNOSIS — Z01.83 ENCOUNTER FOR BLOOD TYPING: ICD-10-CM

## 2018-01-30 DIAGNOSIS — Z11.0 SCREENING EXAMINATION FOR CHOLERA: ICD-10-CM

## 2018-01-30 LAB
ABO + RH BLD: NORMAL
ABO + RH BLD: NORMAL
BASOPHILS # BLD AUTO: 0 10E9/L (ref 0–0.2)
BASOPHILS NFR BLD AUTO: 0.5 %
BLD GP AB SCN SERPL QL: NORMAL
BLD GP AB SCN SERPL QL: NORMAL
BLOOD BANK CMNT PATIENT-IMP: NORMAL
BLOOD BANK CMNT PATIENT-IMP: NORMAL
DEPRECATED CALCIDIOL+CALCIFEROL SERPL-MC: 37 UG/L (ref 20–75)
DIFFERENTIAL METHOD BLD: NORMAL
EOSINOPHIL # BLD AUTO: 0.2 10E9/L (ref 0–0.7)
EOSINOPHIL NFR BLD AUTO: 3.2 %
ERYTHROCYTE [DISTWIDTH] IN BLOOD BY AUTOMATED COUNT: 12.6 % (ref 10–15)
HBA1C MFR BLD: 5.2 % (ref 4.3–6)
HBV SURFACE AG SERPL QL IA: NONREACTIVE
HCT VFR BLD AUTO: 40.5 % (ref 35–47)
HCV AB SERPL QL IA: NONREACTIVE
HGB BLD-MCNC: 13.6 G/DL (ref 11.7–15.7)
HIV 1+2 AB+HIV1 P24 AG SERPL QL IA: NONREACTIVE
LYMPHOCYTES # BLD AUTO: 2.3 10E9/L (ref 0.8–5.3)
LYMPHOCYTES NFR BLD AUTO: 37.1 %
MCH RBC QN AUTO: 29.4 PG (ref 26.5–33)
MCHC RBC AUTO-ENTMCNC: 33.6 G/DL (ref 31.5–36.5)
MCV RBC AUTO: 88 FL (ref 78–100)
MONOCYTES # BLD AUTO: 0.5 10E9/L (ref 0–1.3)
MONOCYTES NFR BLD AUTO: 7.9 %
NEUTROPHILS # BLD AUTO: 3.2 10E9/L (ref 1.6–8.3)
NEUTROPHILS NFR BLD AUTO: 51.3 %
PLATELET # BLD AUTO: 405 10E9/L (ref 150–450)
PROLACTIN SERPL-MCNC: 8 UG/L (ref 3–27)
RBC # BLD AUTO: 4.62 10E12/L (ref 3.8–5.2)
SPECIMEN EXP DATE BLD: NORMAL
T PALLIDUM IGG+IGM SER QL: NEGATIVE
T4 FREE SERPL-MCNC: 1.08 NG/DL (ref 0.76–1.46)
TSH SERPL DL<=0.005 MIU/L-ACNC: 2.06 MU/L (ref 0.4–4)
WBC # BLD AUTO: 6.2 10E9/L (ref 4–11)

## 2018-01-30 PROCEDURE — 86803 HEPATITIS C AB TEST: CPT | Performed by: OBSTETRICS & GYNECOLOGY

## 2018-01-30 PROCEDURE — 87389 HIV-1 AG W/HIV-1&-2 AB AG IA: CPT | Performed by: OBSTETRICS & GYNECOLOGY

## 2018-01-30 PROCEDURE — 36415 COLL VENOUS BLD VENIPUNCTURE: CPT | Performed by: OBSTETRICS & GYNECOLOGY

## 2018-01-30 PROCEDURE — 86147 CARDIOLIPIN ANTIBODY EA IG: CPT | Performed by: OBSTETRICS & GYNECOLOGY

## 2018-01-30 PROCEDURE — 85730 THROMBOPLASTIN TIME PARTIAL: CPT | Performed by: OBSTETRICS & GYNECOLOGY

## 2018-01-30 PROCEDURE — 86850 RBC ANTIBODY SCREEN: CPT | Performed by: OBSTETRICS & GYNECOLOGY

## 2018-01-30 PROCEDURE — 84439 ASSAY OF FREE THYROXINE: CPT | Performed by: OBSTETRICS & GYNECOLOGY

## 2018-01-30 PROCEDURE — 86787 VARICELLA-ZOSTER ANTIBODY: CPT | Performed by: OBSTETRICS & GYNECOLOGY

## 2018-01-30 PROCEDURE — 86900 BLOOD TYPING SEROLOGIC ABO: CPT | Performed by: OBSTETRICS & GYNECOLOGY

## 2018-01-30 PROCEDURE — 84443 ASSAY THYROID STIM HORMONE: CPT | Performed by: OBSTETRICS & GYNECOLOGY

## 2018-01-30 PROCEDURE — 86762 RUBELLA ANTIBODY: CPT | Performed by: OBSTETRICS & GYNECOLOGY

## 2018-01-30 PROCEDURE — 87340 HEPATITIS B SURFACE AG IA: CPT | Performed by: OBSTETRICS & GYNECOLOGY

## 2018-01-30 PROCEDURE — 00000401 ZZHCL STATISTIC THROMBIN TIME NC: Performed by: OBSTETRICS & GYNECOLOGY

## 2018-01-30 PROCEDURE — 86901 BLOOD TYPING SEROLOGIC RH(D): CPT | Performed by: OBSTETRICS & GYNECOLOGY

## 2018-01-30 PROCEDURE — 83520 IMMUNOASSAY QUANT NOS NONAB: CPT | Mod: 90 | Performed by: OBSTETRICS & GYNECOLOGY

## 2018-01-30 PROCEDURE — 85025 COMPLETE CBC W/AUTO DIFF WBC: CPT | Performed by: OBSTETRICS & GYNECOLOGY

## 2018-01-30 PROCEDURE — 99000 SPECIMEN HANDLING OFFICE-LAB: CPT | Performed by: OBSTETRICS & GYNECOLOGY

## 2018-01-30 PROCEDURE — 82306 VITAMIN D 25 HYDROXY: CPT | Performed by: OBSTETRICS & GYNECOLOGY

## 2018-01-30 PROCEDURE — 83036 HEMOGLOBIN GLYCOSYLATED A1C: CPT | Performed by: OBSTETRICS & GYNECOLOGY

## 2018-01-30 PROCEDURE — 00000167 ZZHCL STATISTIC INR NC: Performed by: OBSTETRICS & GYNECOLOGY

## 2018-01-30 PROCEDURE — 87591 N.GONORRHOEAE DNA AMP PROB: CPT | Performed by: OBSTETRICS & GYNECOLOGY

## 2018-01-30 PROCEDURE — 86146 BETA-2 GLYCOPROTEIN ANTIBODY: CPT | Performed by: OBSTETRICS & GYNECOLOGY

## 2018-01-30 PROCEDURE — 87491 CHLMYD TRACH DNA AMP PROBE: CPT | Performed by: OBSTETRICS & GYNECOLOGY

## 2018-01-30 PROCEDURE — 86780 TREPONEMA PALLIDUM: CPT | Performed by: OBSTETRICS & GYNECOLOGY

## 2018-01-30 PROCEDURE — 85613 RUSSELL VIPER VENOM DILUTED: CPT | Performed by: OBSTETRICS & GYNECOLOGY

## 2018-01-30 PROCEDURE — 84146 ASSAY OF PROLACTIN: CPT | Performed by: OBSTETRICS & GYNECOLOGY

## 2018-01-31 LAB
B2 GLYCOPROT1 IGG SERPL IA-ACNC: 0.9 U/ML
B2 GLYCOPROT1 IGM SERPL IA-ACNC: 1.6 U/ML
C TRACH DNA SPEC QL NAA+PROBE: NEGATIVE
CARDIOLIPIN ANTIBODY IGG: <1.6 GPL-U/ML (ref 0–19.9)
CARDIOLIPIN ANTIBODY IGM: 1.8 MPL-U/ML (ref 0–19.9)
MIS SERPL-MCNC: 3.77 NG/ML (ref 0.18–11.71)
N GONORRHOEA DNA SPEC QL NAA+PROBE: NEGATIVE
RUBV IGG SERPL IA-ACNC: 68 IU/ML
SPECIMEN SOURCE: NORMAL
SPECIMEN SOURCE: NORMAL
VZV IGG SER QL IA: 4.3 AI (ref 0–0.8)

## 2018-02-01 LAB — LA PPP-IMP: NEGATIVE

## 2018-03-09 DIAGNOSIS — Z32.00 POSSIBLE PREGNANCY: Primary | ICD-10-CM

## 2018-03-09 DIAGNOSIS — Z13.29 SCREENING FOR THYROID DISORDER: ICD-10-CM

## 2018-03-09 LAB
B-HCG SERPL-ACNC: 9920 IU/L (ref 0–5)
PROGEST SERPL-MCNC: 21.1 NG/ML
TSH SERPL DL<=0.005 MIU/L-ACNC: 2.63 MU/L (ref 0.4–4)

## 2018-03-09 PROCEDURE — 84702 CHORIONIC GONADOTROPIN TEST: CPT | Performed by: OBSTETRICS & GYNECOLOGY

## 2018-03-09 PROCEDURE — 84443 ASSAY THYROID STIM HORMONE: CPT | Performed by: OBSTETRICS & GYNECOLOGY

## 2018-03-09 PROCEDURE — 84144 ASSAY OF PROGESTERONE: CPT | Performed by: OBSTETRICS & GYNECOLOGY

## 2018-03-09 PROCEDURE — 36415 COLL VENOUS BLD VENIPUNCTURE: CPT | Performed by: OBSTETRICS & GYNECOLOGY

## 2018-03-30 ENCOUNTER — PRENATAL OFFICE VISIT (OUTPATIENT)
Dept: OBGYN | Facility: CLINIC | Age: 38
End: 2018-03-30
Payer: COMMERCIAL

## 2018-03-30 VITALS
DIASTOLIC BLOOD PRESSURE: 65 MMHG | TEMPERATURE: 98.6 F | SYSTOLIC BLOOD PRESSURE: 126 MMHG | WEIGHT: 110 LBS | HEART RATE: 90 BPM | HEIGHT: 63 IN | BODY MASS INDEX: 19.49 KG/M2 | RESPIRATION RATE: 16 BRPM

## 2018-03-30 DIAGNOSIS — Z98.891 H/O: C-SECTION: ICD-10-CM

## 2018-03-30 DIAGNOSIS — Z34.80 SUPERVISION OF OTHER NORMAL PREGNANCY, ANTEPARTUM: Primary | ICD-10-CM

## 2018-03-30 DIAGNOSIS — O09.529 ANTEPARTUM MULTIGRAVIDA OF ADVANCED MATERNAL AGE: ICD-10-CM

## 2018-03-30 PROBLEM — E03.8 OTHER SPECIFIED HYPOTHYROIDISM: Status: ACTIVE | Noted: 2018-03-30

## 2018-03-30 LAB
ABO + RH BLD: NORMAL
ABO + RH BLD: NORMAL
ALBUMIN UR-MCNC: NEGATIVE MG/DL
AMPHETAMINES UR QL: NOT DETECTED NG/ML
APPEARANCE UR: CLEAR
BARBITURATES UR QL SCN: NOT DETECTED NG/ML
BENZODIAZ UR QL SCN: NOT DETECTED NG/ML
BILIRUB UR QL STRIP: NEGATIVE
BLD GP AB SCN SERPL QL: NORMAL
BLOOD BANK CMNT PATIENT-IMP: NORMAL
BUPRENORPHINE UR QL: NOT DETECTED NG/ML
CANNABINOIDS UR QL: NOT DETECTED NG/ML
COCAINE UR QL SCN: NOT DETECTED NG/ML
COLOR UR AUTO: YELLOW
D-METHAMPHET UR QL: NOT DETECTED NG/ML
ERYTHROCYTE [DISTWIDTH] IN BLOOD BY AUTOMATED COUNT: 12.1 % (ref 10–15)
GLUCOSE UR STRIP-MCNC: NEGATIVE MG/DL
HCT VFR BLD AUTO: 37.8 % (ref 35–47)
HGB BLD-MCNC: 13 G/DL (ref 11.7–15.7)
HGB UR QL STRIP: NEGATIVE
KETONES UR STRIP-MCNC: 15 MG/DL
LEUKOCYTE ESTERASE UR QL STRIP: NEGATIVE
MCH RBC QN AUTO: 30 PG (ref 26.5–33)
MCHC RBC AUTO-ENTMCNC: 34.4 G/DL (ref 31.5–36.5)
MCV RBC AUTO: 87 FL (ref 78–100)
METHADONE UR QL SCN: NOT DETECTED NG/ML
NITRATE UR QL: NEGATIVE
OPIATES UR QL SCN: NOT DETECTED NG/ML
OXYCODONE UR QL SCN: NOT DETECTED NG/ML
PCP UR QL SCN: NOT DETECTED NG/ML
PH UR STRIP: 7 PH (ref 5–7)
PLATELET # BLD AUTO: 285 10E9/L (ref 150–450)
PROPOXYPH UR QL: NOT DETECTED NG/ML
RBC # BLD AUTO: 4.34 10E12/L (ref 3.8–5.2)
SOURCE: ABNORMAL
SP GR UR STRIP: 1.01 (ref 1–1.03)
SPECIMEN EXP DATE BLD: NORMAL
T4 FREE SERPL-MCNC: 1.39 NG/DL (ref 0.76–1.46)
TRICYCLICS UR QL SCN: NOT DETECTED NG/ML
TSH SERPL DL<=0.005 MIU/L-ACNC: 0.12 MU/L (ref 0.4–4)
UROBILINOGEN UR STRIP-ACNC: 0.2 EU/DL (ref 0.2–1)
WBC # BLD AUTO: 9.1 10E9/L (ref 4–11)

## 2018-03-30 PROCEDURE — 84439 ASSAY OF FREE THYROXINE: CPT | Performed by: OBSTETRICS & GYNECOLOGY

## 2018-03-30 PROCEDURE — 87086 URINE CULTURE/COLONY COUNT: CPT | Performed by: OBSTETRICS & GYNECOLOGY

## 2018-03-30 PROCEDURE — 84443 ASSAY THYROID STIM HORMONE: CPT | Performed by: OBSTETRICS & GYNECOLOGY

## 2018-03-30 PROCEDURE — 87389 HIV-1 AG W/HIV-1&-2 AB AG IA: CPT | Performed by: OBSTETRICS & GYNECOLOGY

## 2018-03-30 PROCEDURE — 86900 BLOOD TYPING SEROLOGIC ABO: CPT | Performed by: OBSTETRICS & GYNECOLOGY

## 2018-03-30 PROCEDURE — 85027 COMPLETE CBC AUTOMATED: CPT | Performed by: OBSTETRICS & GYNECOLOGY

## 2018-03-30 PROCEDURE — 86762 RUBELLA ANTIBODY: CPT | Performed by: OBSTETRICS & GYNECOLOGY

## 2018-03-30 PROCEDURE — 76817 TRANSVAGINAL US OBSTETRIC: CPT | Performed by: OBSTETRICS & GYNECOLOGY

## 2018-03-30 PROCEDURE — 99207 ZZC FIRST OB VISIT: CPT | Performed by: OBSTETRICS & GYNECOLOGY

## 2018-03-30 PROCEDURE — 86901 BLOOD TYPING SEROLOGIC RH(D): CPT | Performed by: OBSTETRICS & GYNECOLOGY

## 2018-03-30 PROCEDURE — 86850 RBC ANTIBODY SCREEN: CPT | Performed by: OBSTETRICS & GYNECOLOGY

## 2018-03-30 PROCEDURE — 81003 URINALYSIS AUTO W/O SCOPE: CPT | Mod: 59 | Performed by: OBSTETRICS & GYNECOLOGY

## 2018-03-30 PROCEDURE — 86780 TREPONEMA PALLIDUM: CPT | Performed by: OBSTETRICS & GYNECOLOGY

## 2018-03-30 PROCEDURE — 36415 COLL VENOUS BLD VENIPUNCTURE: CPT | Performed by: OBSTETRICS & GYNECOLOGY

## 2018-03-30 PROCEDURE — 80306 DRUG TEST PRSMV INSTRMNT: CPT | Performed by: OBSTETRICS & GYNECOLOGY

## 2018-03-30 PROCEDURE — 87340 HEPATITIS B SURFACE AG IA: CPT | Performed by: OBSTETRICS & GYNECOLOGY

## 2018-03-30 NOTE — MR AVS SNAPSHOT
After Visit Summary   3/30/2018    María Briones    MRN: [id number]           Patient Information     Date Of Birth          1980        Visit Information        Provider Department      3/30/2018 2:00 PM Zayda Lynn MD; [facility]        Today's Diagnoses     Supervision of other normal pregnancy, antepartum    -  1    H/O:         Antepartum multigravida of advanced maternal age           Follow-ups after your visit        Your next 10 appointments already scheduled     2018  9:15 AM CDT   ESTABL[facility] with [facility]y [facility]   [facility]    [address] [address]   [phone]            May 23, 2018  4:00 PM CDT   ESTABLISHED PRENATAL with Zayda Lynn MD   [facility]    [address] [address]   [phone]              Who to contact     If you have questions or need follow up information about today's clinic visit or your schedule please contact [facility] directly at [phone].  Normal or non-critical lab and imaging results will be communicated to you by MyChart, letter or phone within 4 business days after the clinic has received the results. If you do not hear from us within 7 days, please contact the clinic through [employer] or phone. If you have a critical or abnormal lab result, we will notify you by phone as soon as possible.  Submit refill requests through "[employer]" or call your pharmacy and they will forward the refill request to us. Please allow 3 business days for your refill to be completed.          Additional Information About Your Visit        [employer] Information     "[employer] gives you secure access to your electronic health record. If you see a primary care provider, you can also send messages to your care team and make appointments. If you have  "questions, please call your primary care clinic.  If you do not have a primary care provider, please call [phone] and they will assist you.        Care EveryWhere ID     This is your Care EveryWhere ID. This could be used by other organizations to access your [place] medical records  [id number]        Your Vitals Were     Pulse Temperature Respirations Height Last Period BMI (Body Mass Index)    90 98.6  F (37  C) (Tympanic) 16 5' 3\" (1.6 m) 01/30/2018 (Approximate) 19.49 kg/m2       Blood Pressure from Last 3 Encounters:   03/30/18 126/65   12/27/17 128/73   04/28/17 137/57    Weight from Last 3 Encounters:   03/30/18 110 lb (49.9 kg)   04/28/17 109 lb 3.2 oz (49.5 kg)   03/01/17 104 lb (47.2 kg)              We Performed the Following     *UA reflex to Microscopic     ABO/Rh type and screen     Anti Treponema     CBC with platelets     Hepatitis B surface antigen     HIV Antigen Antibody Combo     Rubella Antibody IgG Quantitative     TSH with free T4 reflex     Urine Culture Aerobic Bacterial     Urine Drugs of Abuse Screen Panel 13     US OB <14 Weeks w Transvaginal Single          Today's Medication Changes          These changes are accurate as of 3/30/18  2:55 PM.  If you have any questions, ask your nurse or doctor.               Stop taking these medicines if you haven't already. Please contact your care team if you have questions.     ciclopirox 0.77 % cream   Commonly known as:  LOPROX   Stopped by:  Zayda Lynn MD           ESTRACE PO   Stopped by:  Zayda Lynn MD           ibuprofen 400 MG tablet   Commonly known as:  ADVIL/MOTRIN   Stopped by:  Zayda Lynn MD           RECLIPSEN PO   Stopped by:  Zayda Lynn MD                    Primary Care Provider Fax #    Physician No Ref-Primary [phone]       No address on file        Equal Access to Services     SHERLYN QURESHI AH: Regi Jaeger, eleazar mcguire, qachidita mc " juan weaverjasonjenna la'aafeli ah. [facility] [phone].    ATENCIÓN: Si habla lynda, tiene a laird disposición servicios gratuitos de asistencia lingüística. Shaka al [phone].    We comply with applicable federal civil rights laws and [place] laws. We do not discriminate on the basis of race, color, national origin, age, disability, sex, sexual orientation, or gender identity.            Thank you!     Thank you for choosing [facility]  for your care. Our goal is always to provide you with excellent care. Hearing back from our patients is one way we can continue to improve our services. Please take a few minutes to complete the written survey that you may receive in the mail after your visit with us. Thank you!             Your Updated Medication List - Protect others around you: Learn how to safely use, store and throw away your medicines at www.disposemymeds.org.          This list is accurate as of 3/30/18  2:55 PM.  Always use your most recent med list.                   Brand Name Dispense Instructions for use Diagnosis    prenatal multivitamin plus iron 27-0.8 MG Tabs per tablet      Take 1 tablet by mouth daily        SYNTHROID PO      Take 25 mcg by mouth    Supervision of other normal pregnancy, antepartum

## 2018-03-30 NOTE — PROGRESS NOTES
Discussed physician coverage, tertiary support, diet, exercise, weight gain, schedule of visits, routine and indicated ultrasounds, childbirth education and antepartum testing for certain birth defects.  Encouraged patient to review contents of Prenatal Breastfeeding Education Toolkit. Offered opportunity to answer questions regarding the importance of skin to skin contact, early initiation of exclusive breastfeeding for the first six months and rooming in while in the hospital.  Discussed [facility] travel advisory for Zika virus.  Syphilis is a sexually transmitted disease that can cause birth defects in the babies of untreated mothers. Every pregnant patient is tested for syphilis early in each pregnancy as part of the routine lab work. The [place] Department of Health has seen an increase in the rate of syphilis in [place]. The [facility] now recommends testing for syphilis 3 times during a pregnancy, the new prenatal visit, 28 weeks and when admitted for delivery. Patient accepts lab testing for syphilis.        Options for  testing for birth defects were discussed with the patient, generally including CVS testing, Quad screen serum testing, nuchal lucency/blood marker testing, genetic amniocentesis, Verifi testing  and/or level 2 ultrasound.    Current issues include: spontaneous conception after 3 chemical pregnancies with frozen embryo transfer in 2017  Would like Verifi test and L2 sono    Past medical, surgical, social and family histories reviewed on OB questionaire and included on episode summary.  Pertinent review of systems items stated above. See OB questionaire for pertinent components of HPI.    Past Medical History:   Diagnosis Date     Chickenpox      Palpitations 2008     Urinary tract bacterial infections     as chold     See epic chart    Past Surgical History:   Procedure Laterality Date     C ORAL SURGERY PROCEDURE      wisdom teeth      SECTION N/A 10/12/2015    Procedure:  " SECTION;  Surgeon: Jayshree Garcia MD;  Location: [facility] OR     LAPAROSCOPIC ABLATION ENDOMETRIOSIS  4/15/2014    Procedure: LAPAROSCOPIC ABLATION ENDOMETRIOSIS;  Surgeon: Mohamud Osorio MD;  Location: [facility]     LAPAROSCOPIC TUBAL DYE STUDY  4/15/2014    Procedure: LAPAROSCOPIC TUBAL DYE STUDY;  Surgeon: Mohamud Osorio MD;  Location: [facility]     LAPAROSCOPY DIAGNOSTIC (GYN)  4/15/2014    Procedure: Operative Laparoscopy, Excision Cauterization Of Endometriosis, Chromopertubation ;  Surgeon: Mohamud Osorio MD;  Location: [facility] OR     See epic chart    Patient Active Problem List    Diagnosis Date Noted     Supervision of other normal pregnancy, antepartum 2018     Priority: High     Spontaneous conception after 3 failed embryo transfers in        H/O:  2018     Priority: High     Antepartum multigravida of advanced maternal age 2018     Priority: High     Other specified hypothyroidism 2018     Priority: Medium     Female infertility 2014     Priority: Medium     History of Stage III endometriosis by laparoscopy on 4/15/14    Prior Successful IVF       Dysmenorrhea 2014     Priority: Medium     Irregular periods 2013     Priority: Medium       OBJECTIVE: /65 (BP Location: Right arm, Patient Position: Chair, Cuff Size: Adult Small)  Pulse 90  Temp 98.6  F (37  C) (Tympanic)  Resp 16  Ht 5' 3\" (1.6 m)  Wt 110 lb (49.9 kg)  LMP 2018 (Approximate)  BMI 19.49 kg/m2    GENERAL APPEARANCE: healthy, alert and no distress  ABDOMEN:  soft, nontender, no hepato-splenomegaly or hernias  PELVIC:  EGBUS:  within normal limits  VAGINA:  normoestrogenic, well-supported, no unusual discharge  CERVIX:  smooth, non-friable, no gross lesions, thin-layer PAP was not taken  UTERUS:  anteverted and enlarged, 10 weeks size  ADNEXAE:  non-tender, no masses palpable, no cul de sac nodularity     NECK: no adenopathy, no asymmetry, masses, or scars and thyroid " normal to palpation     RESP: lungs clear to auscultation , respiratory effort WNL     CV: regular rates and rhythm, normal S1 S2, no S3 or S4 and no murmur, click or rub -     SKIN: no suspicious lesions or rashes     PSYCH: mentation appears normal. and affect normal/bright, oriented to person/place/time     LYMPHATICS: No axillary, cervical, inguinal, or supraclavicular nodes    Transvaginal ultrasound was performed. A live single intrauterine pregnancy was seen.  CRL=2.09 cm, c/w 8 weeks, 6 days.  EDC by sono =11/3/18    Fetal heart motion was visualized. RTI=538 bpm    ASSESSMENT:    ICD-10-CM    1. Supervision of other normal pregnancy, antepartum Z34.80 CBC with platelets     ABO/Rh type and screen     Hepatitis B surface antigen     Rubella Antibody IgG Quantitative     Urine Culture Aerobic Bacterial     *UA reflex to Microscopic     Anti Treponema     HIV Antigen Antibody Combo     US OB <14 Weeks w Transvaginal Single     Urine Drugs of Abuse Screen Panel 13     Levothyroxine Sodium (SYNTHROID PO)     TSH with free T4 reflex   2. H/O:  Z98.891    3. Antepartum multigravida of advanced maternal age O09.529          PLAN: see orders and OB problem list annotations  Pt informed that we do not provide primary TOLAC services at [facility], could obtain consult with our colleagues at [facility] for delivery at [place] if desired  Pt desires Verifi test and L2 sonogram for AMA    Zayda Bianca Mericle

## 2018-03-30 NOTE — NURSING NOTE
"Initial /65 (BP Location: Right arm, Patient Position: Chair, Cuff Size: Adult Small)  Pulse 90  Temp 98.6  F (37  C) (Tympanic)  Resp 16  Ht 5' 3\" (1.6 m)  Wt 110 lb (49.9 kg)  LMP 01/30/2018 (Approximate)  BMI 19.49 kg/m2 Estimated body mass index is 19.49 kg/(m^2) as calculated from the following:    Height as of this encounter: 5' 3\" (1.6 m).    Weight as of this encounter: 110 lb (49.9 kg). .      "

## 2018-03-31 LAB
BACTERIA SPEC CULT: NO GROWTH
Lab: NORMAL
SPECIMEN SOURCE: NORMAL
T PALLIDUM IGG+IGM SER QL: NEGATIVE

## 2018-04-02 LAB
HBV SURFACE AG SERPL QL IA: NONREACTIVE
HIV 1+2 AB+HIV1 P24 AG SERPL QL IA: NONREACTIVE
RUBV IGG SERPL IA-ACNC: 57 IU/ML

## 2018-04-24 ENCOUNTER — PRENATAL OFFICE VISIT (OUTPATIENT)
Dept: OBGYN | Facility: CLINIC | Age: 38
End: 2018-04-24
Payer: COMMERCIAL

## 2018-04-24 VITALS
BODY MASS INDEX: 19.66 KG/M2 | SYSTOLIC BLOOD PRESSURE: 122 MMHG | WEIGHT: 111 LBS | TEMPERATURE: 98.7 F | DIASTOLIC BLOOD PRESSURE: 68 MMHG | HEART RATE: 83 BPM

## 2018-04-24 DIAGNOSIS — Z34.80 SUPERVISION OF OTHER NORMAL PREGNANCY, ANTEPARTUM: Primary | ICD-10-CM

## 2018-04-24 PROCEDURE — 40000791 ZZHCL STATISTIC VERIFI PRENATAL TRISOMY 21,18,13: Mod: 90 | Performed by: OBSTETRICS & GYNECOLOGY

## 2018-04-24 PROCEDURE — 99207 ZZC PRENATAL VISIT: CPT | Performed by: OBSTETRICS & GYNECOLOGY

## 2018-04-24 PROCEDURE — 76801 OB US < 14 WKS SINGLE FETUS: CPT | Performed by: OBSTETRICS & GYNECOLOGY

## 2018-04-24 PROCEDURE — 36415 COLL VENOUS BLD VENIPUNCTURE: CPT | Performed by: OBSTETRICS & GYNECOLOGY

## 2018-04-24 PROCEDURE — 99000 SPECIMEN HANDLING OFFICE-LAB: CPT | Performed by: OBSTETRICS & GYNECOLOGY

## 2018-04-24 NOTE — PROGRESS NOTES
Doing well.   No complaints. Nausea improving.  Denies VB, cramping.   LMP 2018 (Approximate)  General Appearance: NAD  Abdomen: Gravid, NT  Refer to flow sheet above.   A transabdominal US was performed today. A live single intrauterine pregnancy was seen. Fetal cardiac activity at 176 bpm        A/P: 37 year old  at 12w0d  -- subclinical hypothyroidism (low TSH, nl free T4): was taking 25 mcg synthroid for infertility to get TSH < 2.50;  recommend she discontinue today; will repeat testing in another month  -- reviewed other normal new OB labs  -- AMA status: NIPT discussed; patient elects gender disclosure; NIPT ordered; will plan to order L2 [facility] ultrasound at the her next visit  -- previous : patient considering TOLAC; reviewed predicted chance of  to be 71% ([facility]  calculator @ [email or web address]); patient currently is undecided  -- SAB precautions reviewed  RTC in 4 weeks    Mustapha Eddy MD  [facility]

## 2018-04-24 NOTE — NURSING NOTE
"Initial /68 (BP Location: Left arm, Patient Position: Chair, Cuff Size: Adult Regular)  Pulse 83  Temp 98.7  F (37.1  C) (Tympanic)  Wt 111 lb (50.3 kg)  LMP 01/30/2018 (Approximate)  Breastfeeding? No  BMI 19.66 kg/m2 Estimated body mass index is 19.66 kg/(m^2) as calculated from the following:    Height as of 3/30/18: 5' 3\" (1.6 m).    Weight as of this encounter: 111 lb (50.3 kg). .    Zuleima Stafford    "

## 2018-04-24 NOTE — MR AVS SNAPSHOT
After Visit Summary   4/24/2018    María Briones    MRN: [id number]           Patient Information     Date Of Birth          1980        Visit Information        Provider Department      4/24/2018 9:15 AM Mustapha Eddy MD [facility]        Today's Diagnoses     Supervision of other normal pregnancy, antepartum    -  1       Follow-ups after your visit        Your next 10 appointments already scheduled     May 23, 2018  4:00 PM CDT   ESTABLISHED PRENATAL with Zayda Lynn MD   [facility] ([facility])    [address] [address]   [phone]              Who to contact     If you have questions or need follow up information about today's clinic visit or your schedule please contact [facility] directly at [phone].  Normal or non-critical lab and imaging results will be communicated to you by MyChart, letter or phone within 4 business days after the clinic has received the results. If you do not hear from us within 7 days, please contact the clinic through MyChart or phone. If you have a critical or abnormal lab result, we will notify you by phone as soon as possible.  Submit refill requests through [employer] or call your pharmacy and they will forward the refill request to us. Please allow 3 business days for your refill to be completed.          Additional Information About Your Visit        MyChart Information     [employer] gives you secure access to your electronic health record. If you see a primary care provider, you can also send messages to your care team and make appointments. If you have questions, please call your primary care clinic.  If you do not have a primary care provider, please call [phone] and they will assist you.        Care EveryWhere ID     This is your Care EveryWhere ID. This could be used by other organizations to access your [place] medical records  [id number]         Your Vitals Were     Pulse Temperature Last Period Breastfeeding? BMI (Body Mass Index)       83 98.7  F (37.1  C) (Tympanic) 01/30/2018 (Approximate) No 19.66 kg/m2        Blood Pressure from Last 3 Encounters:   04/24/18 122/68   03/30/18 126/65   12/27/17 128/73    Weight from Last 3 Encounters:   04/24/18 111 lb (50.3 kg)   03/30/18 110 lb (49.9 kg)   04/28/17 109 lb 3.2 oz (49.5 kg)              We Performed the Following     Non Invasive Prenatal Test Cell Free DNA     US OB < 14 Weeks Single        Primary Care Provider Fax #    Physician No Ref-Primary [phone]       No address on file        Equal Access to Services     SHERLYN QURESHI : Regi Jaeger, eleazar mcguire, aparna kaalmazoe weaver, juan kennedy. So [facility] [phone].    ATENCIÓN: Si habla español, tiene a laird disposición servicios gratuitos de asistencia lingüística. Llame al [phone].    We comply with applicable federal civil rights laws and [place] laws. We do not discriminate on the basis of race, color, national origin, age, disability, sex, sexual orientation, or gender identity.            Thank you!     Thank you for choosing [facility]  for your care. Our goal is always to provide you with excellent care. Hearing back from our patients is one way we can continue to improve our services. Please take a few minutes to complete the written survey that you may receive in the mail after your visit with us. Thank you!             Your Updated Medication List - Protect others around you: Learn how to safely use, store and throw away your medicines at www.disposemymeds.org.          This list is accurate as of 4/24/18 10:05 AM.  Always use your most recent med list.                   Brand Name Dispense Instructions for use Diagnosis    prenatal multivitamin plus iron 27-0.8 MG Tabs per tablet      Take 1 tablet by mouth daily        SYNTHROID PO      Take 25 mcg by mouth     Supervision of other normal pregnancy, antepartum

## 2018-04-27 ENCOUNTER — TELEPHONE (OUTPATIENT)
Dept: OBGYN | Facility: CLINIC | Age: 38
End: 2018-04-27

## 2018-04-27 NOTE — TELEPHONE ENCOUNTER
Celestina calling from [employer] and has 2 questions: 1) If this is a single or twin pregnancy and 2) Is gender suppose to be known.  In reviewing chart this is a single pregnancy and I called María to inquire if she wanted to know the gender of baby and they do not want to know.  I called [employer] back and spoke to Dorys and she was advised of both concerns.    Gloria Desai  [facility] RN

## 2018-05-01 ENCOUNTER — TELEPHONE (OUTPATIENT)
Dept: OBGYN | Facility: CLINIC | Age: 38
End: 2018-05-01

## 2018-05-01 NOTE — TELEPHONE ENCOUNTER
Call to patient to notify of NIPT testing results.  Patient notified that no aneuploidy detected on chromosomes 13,18 or 21. Normal test results.    Pt reports understanding.    Lor Rand   [facility]  RN

## 2018-05-18 LAB — LAB SCANNED RESULT: NORMAL

## 2018-05-21 NOTE — PROGRESS NOTES
progeniti is negative.   Will review at next follow-up visit.     Mustapha Eddy MD  [facility]

## 2018-05-23 ENCOUNTER — PRENATAL OFFICE VISIT (OUTPATIENT)
Dept: OBGYN | Facility: CLINIC | Age: 38
End: 2018-05-23
Payer: COMMERCIAL

## 2018-05-23 VITALS
TEMPERATURE: 98.6 F | HEART RATE: 78 BPM | BODY MASS INDEX: 20.73 KG/M2 | DIASTOLIC BLOOD PRESSURE: 72 MMHG | RESPIRATION RATE: 16 BRPM | WEIGHT: 117 LBS | SYSTOLIC BLOOD PRESSURE: 123 MMHG | HEIGHT: 63 IN

## 2018-05-23 DIAGNOSIS — Z34.80 SUPERVISION OF OTHER NORMAL PREGNANCY, ANTEPARTUM: Primary | ICD-10-CM

## 2018-05-23 DIAGNOSIS — O09.522 ELDERLY MULTIGRAVIDA IN SECOND TRIMESTER: ICD-10-CM

## 2018-05-23 DIAGNOSIS — E03.8 OTHER SPECIFIED HYPOTHYROIDISM: ICD-10-CM

## 2018-05-23 LAB — TSH SERPL DL<=0.005 MIU/L-ACNC: 0.78 MU/L (ref 0.4–4)

## 2018-05-23 PROCEDURE — 99207 ZZC PRENATAL VISIT: CPT | Performed by: OBSTETRICS & GYNECOLOGY

## 2018-05-23 PROCEDURE — 84443 ASSAY THYROID STIM HORMONE: CPT | Performed by: OBSTETRICS & GYNECOLOGY

## 2018-05-23 PROCEDURE — 36415 COLL VENOUS BLD VENIPUNCTURE: CPT | Performed by: OBSTETRICS & GYNECOLOGY

## 2018-05-23 NOTE — MR AVS SNAPSHOT
After Visit Summary   5/23/2018    María Briones    MRN: [id number]           Patient Information     Date Of Birth          1980        Visit Information        Provider Department      5/23/2018 4:00 PM Zayda Lynn MD [facility]        Today's Diagnoses     Supervision of other normal pregnancy, antepartum    -  1    Other specified hypothyroidism        Elderly multigravida in second trimester           Follow-ups after your visit        Additional Services     MAT FETAL MED CTR REFERRAL-PREGNANCY       >> Patient may proceed with recommendations for further testing as directed by the Maternal Fetal Medicine Specialist >>    >> If requesting Fetal Echo: MFM will determine appropriate location for exam due to indication.    >> If requesting Lung Maturity Amnio:  If results indicate fetal lung maturity, induction or C/S is recommended within 36 hours.  Please schedule accordingly.     Dear Patient:   Please be aware that coverage of these services is subject to the terms and limitations of your health insurance plan.  Call member services at your health plan with any benefit or coverage questions.      Please bring the following to your appointment:    >>  Any x-rays, CTs or MRIs which have been performed.  Contact the facility where they were done to arrange for  prior to your scheduled appointment.  Any new CT, MRI or other procedures ordered by your specialist must be performed at a [place] facility or coordinated by your clinic's referral office.  >>  List of current medications   >>  This referral request   >>  Any documents/labs given to you for this referral                  Who to contact     If you have questions or need follow up information about today's clinic visit or your schedule please contact [facility] directly at [phone].  Normal or non-critical lab and imaging results will be communicated to you by MyChart, letter or phone  "within 4 business days after the clinic has received the results. If you do not hear from us within 7 days, please contact the clinic through [email or web address] or phone. If you have a critical or abnormal lab result, we will notify you by phone as soon as possible.  Submit refill requests through [email or web address] or call your pharmacy and they will forward the refill request to us. Please allow 3 business days for your refill to be completed.          Additional Information About Your Visit        [employer] Information     [email or web address] gives you secure access to your electronic health record. If you see a primary care provider, you can also send messages to your care team and make appointments. If you have questions, please call your primary care clinic.  If you do not have a primary care provider, please call [phone] and they will assist you.        Care EveryWhere ID     This is your Care EveryWhere ID. This could be used by other organizations to access your [place] medical records  [id number]        Your Vitals Were     Pulse Temperature Respirations Height Last Period BMI (Body Mass Index)    78 98.6  F (37  C) (Tympanic) 16 5' 3\" (1.6 m) 01/30/2018 (Approximate) 20.73 kg/m2       Blood Pressure from Last 3 Encounters:   05/23/18 123/72   04/24/18 122/68   03/30/18 126/65    Weight from Last 3 Encounters:   05/23/18 117 lb (53.1 kg)   04/24/18 111 lb (50.3 kg)   03/30/18 110 lb (49.9 kg)              We Performed the Following     MAT FETAL MED CTR REFERRAL-PREGNANCY     TSH with free T4 reflex        Primary Care Provider Fax #    Physician No Ref-Primary [phone]       No address on file        Equal Access to Services     [facility]: Hadii mando Jaeger, waaxda luqadaha, qaybta kaalmada owen, juan kennedy. So [facility] [phone].    ATENCIÓN: Si habla español, tiene a laird disposición servicios gratuitos de asistencia lingüística. Llame al [phone].    We comply with " applicable federal civil rights laws and [place] laws. We do not discriminate on the basis of race, color, national origin, age, disability, sex, sexual orientation, or gender identity.            Thank you!     Thank you for choosing [facility]  for your care. Our goal is always to provide you with excellent care. Hearing back from our patients is one way we can continue to improve our services. Please take a few minutes to complete the written survey that you may receive in the mail after your visit with us. Thank you!             Your Updated Medication List - Protect others around you: Learn how to safely use, store and throw away your medicines at www.disposemymeds.org.          This list is accurate as of 5/23/18  4:15 PM.  Always use your most recent med list.                   Brand Name Dispense Instructions for use Diagnosis    prenatal multivitamin plus iron 27-0.8 MG Tabs per tablet      Take 1 tablet by mouth daily        SYNTHROID PO      Take 25 mcg by mouth    Supervision of other normal pregnancy, antepartum

## 2018-05-23 NOTE — PROGRESS NOTES
"CC: Here for routine prenatal visit @ 16w1d   HPI:  Pt advised to stop Synthroid due to suppressed TSH; will repeat today; anticipates L2 sonogram; reviewed importance of hydration, sun and bug bite protection    PE: /72 (BP Location: Left arm, Patient Position: Chair, Cuff Size: Adult Small)  Pulse 78  Temp 98.6  F (37  C) (Tympanic)  Resp 16  Ht 5' 3\" (1.6 m)  Wt 117 lb (53.1 kg)  LMP 01/30/2018 (Approximate)  BMI 20.73 kg/m2   See OB flowsheet      A:  1. Supervision of other normal pregnancy, antepartum      2. Other specified hypothyroidism    - TSH with free T4 reflex    3. Elderly multigravida in second trimester    - MAT FETAL MED CTR REFERRAL-PREGNANCY      Routine prenatal care  RTC 4 weeks.      Zayda Lynn M.D.     "

## 2018-06-04 ENCOUNTER — PRENATAL OFFICE VISIT (OUTPATIENT)
Dept: OBGYN | Facility: CLINIC | Age: 38
End: 2018-06-04
Payer: COMMERCIAL

## 2018-06-04 ENCOUNTER — TELEPHONE (OUTPATIENT)
Dept: OBGYN | Facility: CLINIC | Age: 38
End: 2018-06-04

## 2018-06-04 VITALS
RESPIRATION RATE: 18 BRPM | WEIGHT: 117.4 LBS | HEIGHT: 63 IN | SYSTOLIC BLOOD PRESSURE: 114 MMHG | BODY MASS INDEX: 20.8 KG/M2 | TEMPERATURE: 99.2 F | DIASTOLIC BLOOD PRESSURE: 66 MMHG | HEART RATE: 82 BPM

## 2018-06-04 DIAGNOSIS — Z34.80 SUPERVISION OF OTHER NORMAL PREGNANCY, ANTEPARTUM: Primary | ICD-10-CM

## 2018-06-04 DIAGNOSIS — R82.998 RED-COLORED URINE: ICD-10-CM

## 2018-06-04 LAB
ALBUMIN UR-MCNC: NEGATIVE MG/DL
APPEARANCE UR: CLEAR
BILIRUB UR QL STRIP: NEGATIVE
COLOR UR AUTO: YELLOW
GLUCOSE UR STRIP-MCNC: NEGATIVE MG/DL
HGB UR QL STRIP: NEGATIVE
KETONES UR STRIP-MCNC: NEGATIVE MG/DL
LEUKOCYTE ESTERASE UR QL STRIP: NEGATIVE
NITRATE UR QL: NEGATIVE
PH UR STRIP: 7.5 PH (ref 5–7)
SOURCE: ABNORMAL
SP GR UR STRIP: 1.01 (ref 1–1.03)
UROBILINOGEN UR STRIP-ACNC: 0.2 EU/DL (ref 0.2–1)

## 2018-06-04 PROCEDURE — 99207 ZZC PRENATAL VISIT: CPT | Performed by: OBSTETRICS & GYNECOLOGY

## 2018-06-04 PROCEDURE — 87086 URINE CULTURE/COLONY COUNT: CPT | Performed by: OBSTETRICS & GYNECOLOGY

## 2018-06-04 PROCEDURE — 81003 URINALYSIS AUTO W/O SCOPE: CPT | Performed by: OBSTETRICS & GYNECOLOGY

## 2018-06-04 NOTE — TELEPHONE ENCOUNTER
Reason for Call:  Other blood in urine    Detailed comments: Pt states she is 17 weeks pregnant, having blood in urine, please call to advise    Phone Number Patient can be reached at: Home number on file [phone] (home)    Best Time: today    Can we leave a detailed message on this number? YES    Call taken on 6/4/2018 at 8:08 AM by Adeola Rae

## 2018-06-04 NOTE — MR AVS SNAPSHOT
After Visit Summary   6/4/2018    María Briones    MRN: [id number]           Patient Information     Date Of Birth          1980        Visit Information        Provider Department      6/4/2018 11:00 AM Jayshree Garcia MD [facility]        Today's Diagnoses     Supervision of other normal pregnancy, antepartum    -  1    Red-colored urine           Follow-ups after your visit        Your next 10 appointments already scheduled     Jun 14, 2018  1:30 PM CDT   MFM US COMP with [id number]   [facility] Maternal Fetal Medicine Ultrasound - [facility])    [address] [place] [address] [address]  [facility] [address] [phone]           Wear comfortable clothes and leave your valuables at home.            Jun 14, 2018  2:00 PM CDT   Radiology MD with  JUAN CARPIO   [facility] Maternal Fetal Medicine [facility])    [address] [place] [address] [address]  [facility] [id number] [phone]           Please arrive at the time given for your first appointment. This visit is used internally to schedule the physician's time during your ultrasound.            Jul 02, 2018  4:00 PM CDT   [facility] Established Prenatal with Jayshree Garcia MD   [facility] ([facility])    [address] [id number] [phone]            Aug 02, 2018  4:30 PM CDT   [facility] Established Prenatal with Jayshree Garcia MD   [facility] ([facility])    [address] [address]   [phone]              Who to contact     If you have questions or need follow up information about today's clinic visit or your schedule please contact [facility] directly at [phone].  Normal or non-critical lab and imaging results will be communicated to you by MyChart, letter or phone within 4 business days after the clinic has received the results. If you do not  "hear from us within 7 days, please contact the clinic through [employer] or phone. If you have a critical or abnormal lab result, we will notify you by phone as soon as possible.  Submit refill requests through [employer] or call your pharmacy and they will forward the refill request to us. Please allow 3 business days for your refill to be completed.          Additional Information About Your Visit        [email or web address] Information     [employer] gives you secure access to your electronic health record. If you see a primary care provider, you can also send messages to your care team and make appointments. If you have questions, please call your primary care clinic.  If you do not have a primary care provider, please call [phone] and they will assist you.        Care EveryWhere ID     This is your Care EveryWhere ID. This could be used by other organizations to access your [place] medical records  [id number]        Your Vitals Were     Pulse Temperature Respirations Height Last Period BMI (Body Mass Index)    82 99.2  F (37.3  C) (Tympanic) 18 5' 3\" (1.6 m) 01/30/2018 (Approximate) 20.8 kg/m2       Blood Pressure from Last 3 Encounters:   06/04/18 114/66   05/23/18 123/72   04/24/18 122/68    Weight from Last 3 Encounters:   06/04/18 117 lb 6.4 oz (53.3 kg)   05/23/18 117 lb (53.1 kg)   04/24/18 111 lb (50.3 kg)              We Performed the Following     *UA reflex to Microscopic     Urine Culture Aerobic Bacterial          Today's Medication Changes          These changes are accurate as of 6/4/18 11:37 AM.  If you have any questions, ask your nurse or doctor.               Stop taking these medicines if you haven't already. Please contact your care team if you have questions.     SYNTHROID PO   Stopped by:  Jayshree Garcia MD                    Primary Care Provider Fax #    Physician No Ref-Primary [phone]       No address on file        Equal Access to Services     SHERLYN QURESHI AH: Regi Jaeger, " eleazar mcguire, gudeliaybfernando carrietrisha bitascooter, juan priyain hayaan bitachacha luismatti laCherelleaafeli ah. So [facility] [phone].    ATENCIÓN: Si habla lynda, tiene a laird disposición servicios gratuitos de asistencia lingüística. Llame al [phone].    We comply with applicable federal civil rights laws and [place] laws. We do not discriminate on the basis of race, color, national origin, age, disability, sex, sexual orientation, or gender identity.            Thank you!     Thank you for choosing [facility]  for your care. Our goal is always to provide you with excellent care. Hearing back from our patients is one way we can continue to improve our services. Please take a few minutes to complete the written survey that you may receive in the mail after your visit with us. Thank you!             Your Updated Medication List - Protect others around you: Learn how to safely use, store and throw away your medicines at www.disposemymeds.org.          This list is accurate as of 6/4/18 11:37 AM.  Always use your most recent med list.                   Brand Name Dispense Instructions for use Diagnosis    prenatal multivitamin plus iron 27-0.8 MG Tabs per tablet      Take 1 tablet by mouth daily

## 2018-06-04 NOTE — PROGRESS NOTES
"CC: Here for routine prenatal visit @ 17w6d   HPI: had episode of red urine.  Has since cleared up.  Denies vaginal bleeding.       PE: /66 (BP Location: Right arm, Patient Position: Chair, Cuff Size: Adult Regular)  Pulse 82  Temp 99.2  F (37.3  C) (Tympanic)  Resp 18  Ht 5' 3\" (1.6 m)  Wt 117 lb 6.4 oz (53.3 kg)  LMP 2018 (Approximate)  BMI 20.8 kg/m2   See OB flowsheet    A/P  @ 17w6d normal pregnancy    1. Routine prenatal care.  Discussed possible reasons for red urine, but resolved now.   Encouraged hydration.      RTC as scheduled.      Jayshree Garcia M.D.    "

## 2018-06-04 NOTE — TELEPHONE ENCOUNTER
"Return call to patient.  Spoke with patient on the phone.    S-(situation): cranberry colored urine yesterday, improved throughout the day with patient pushing oral hydration. Patient reports not noticing any blood in the urine today. Patient reports \" abdominal ache\" today. Patient does not report symptoms of UTI.     B-(background):  at 17W6D [facility]    A-(assessment): ? UTI, abnormal urine color yesterday    R-(recommendations): office visit for further evaluation. Patient scheduled for appointment today.    Pt in agreement and reports understanding.    Lor Rand   [facility]  RN      "

## 2018-06-05 LAB
BACTERIA SPEC CULT: NO GROWTH
Lab: NORMAL
SPECIMEN SOURCE: NORMAL

## 2018-06-07 ENCOUNTER — PRE VISIT (OUTPATIENT)
Dept: MATERNAL FETAL MEDICINE | Facility: CLINIC | Age: 38
End: 2018-06-07

## 2018-06-14 ENCOUNTER — HOSPITAL ENCOUNTER (OUTPATIENT)
Dept: ULTRASOUND IMAGING | Facility: CLINIC | Age: 38
Discharge: HOME | End: 2018-06-14
Attending: OBSTETRICS & GYNECOLOGY | Admitting: OBSTETRICS & GYNECOLOGY
Payer: COMMERCIAL

## 2018-06-14 ENCOUNTER — OFFICE VISIT (OUTPATIENT)
Dept: MATERNAL FETAL MEDICINE | Facility: CLINIC | Age: 38
End: 2018-06-14
Attending: OBSTETRICS & GYNECOLOGY
Payer: COMMERCIAL

## 2018-06-14 DIAGNOSIS — O09.522 ELDERLY MULTIGRAVIDA IN SECOND TRIMESTER: ICD-10-CM

## 2018-06-14 DIAGNOSIS — Z36.3 ENCOUNTER FOR ROUTINE SCREENING FOR MALFORMATION USING ULTRASONICS: Primary | ICD-10-CM

## 2018-06-14 DIAGNOSIS — O26.90 PREGNANCY RELATED CONDITION, ANTEPARTUM: ICD-10-CM

## 2018-06-14 PROCEDURE — 76811 OB US DETAILED SNGL FETUS: CPT

## 2018-06-14 NOTE — MR AVS SNAPSHOT
After Visit Summary   6/14/2018    María Briones    MRN: [id number]           Patient Information     Date Of Birth          1980        Visit Information        Provider Department      6/14/2018 2:00 PM Julia Caban,  [facility] Maternal Fetal Medicine [facility]        Today's Diagnoses     Encounter for routine screening for malformation using ultrasonics    -  1    Elderly multigravida in second trimester           Follow-ups after your visit        Your next 10 appointments already scheduled     Jul 02, 2018  4:00 PM CDT   [facility] Established Prenatal with [facility]ays[facility]ree Garcia MD   [facility]    [address] [place] [address]   [phone]            Aug 02, 2018  4:30 PM CDT   [facility] Established Prenatal with Jayshree Garcia MD   [facility]    [address] [place] [address]   [phone]              Who to contact     If you have questions or need follow up information about today's clinic visit or your schedule please contact [facility] MATERNAL FETAL [facility]IC[facility] directly at [phone].  Normal or non-critical lab and imaging results will be communicated to you by MyChart, letter or phone within 4 business days after the clinic has received the results. If you do not hear from us within 7 days, please contact the clinic through Socset.hart or phone. If you have a critical or abnormal lab result, we will notify you by phone as soon as possible.  Submit refill requests through Didatuan or call your pharmacy and they will forward the refill request to us. Please allow 3 business days for your refill to be completed.          Additional Information About Your Visit        [email or web address] Information     Didatuan gives you secure access to your electronic health record. If you see a primary care provider, you can also send messages to your care team and make  appointments. If you have questions, please call your primary care clinic.  If you do not have a primary care provider, please call [phone] and they will assist you.        Care EveryWhere ID     This is your Care EveryWhere ID. This could be used by other organizations to access your [place] medical records  [id number]        Your Vitals Were     Last Period                   01/30/2018 (Approximate)            Blood Pressure from Last 3 Encounters:   06/04/18 114/66   05/23/18 123/72   04/24/18 122/68    Weight from Last 3 Encounters:   06/04/18 53.3 kg (117 lb 6.4 oz)   05/23/18 53.1 kg (117 lb)   04/24/18 50.3 kg (111 lb)              Today, you had the following     No orders found for display       Primary Care Provider Fax #    Physician No Ref-Primary [phone]       No address on file        Equal Access to Services     TOBIAS QURESHI : Hadii mando Jaeger, waritu mcguire, qachidita kaalmada owen, juan nazario . So [facility] [phone].    ATENCIÓN: Si habla español, tiene a laird disposición servicios gratuitos de asistencia lingüística. Llame al [phone].    We comply with applicable federal civil rights laws and [place] laws. We do not discriminate on the basis of race, color, national origin, age, disability, sex, sexual orientation, or gender identity.            Thank you!     Thank you for choosing [facility]  for your care. Our goal is always to provide you with excellent care. Hearing back from our patients is one way we can continue to improve our services. Please take a few minutes to complete the written survey that you may receive in the mail after your visit with us. Thank you!             Your Updated Medication List - Protect others around you: Learn how to safely use, store and throw away your medicines at www.disposemymeds.org.          This list is accurate as of 6/14/18  2:55 PM.  Always use your most  recent med list.                   Brand Name Dispense Instructions for use Diagnosis    prenatal multivitamin plus iron 27-0.8 MG Tabs per tablet      Take 1 tablet by mouth daily

## 2018-06-14 NOTE — PROGRESS NOTES
"Please see \"Imaging\" tab under \"Chart Review\" for details of today's US.    Julia Caban, DO    "

## 2018-07-02 ENCOUNTER — PRENATAL OFFICE VISIT (OUTPATIENT)
Dept: OBGYN | Facility: CLINIC | Age: 38
End: 2018-07-02
Payer: COMMERCIAL

## 2018-07-02 VITALS
HEART RATE: 97 BPM | TEMPERATURE: 99.2 F | RESPIRATION RATE: 16 BRPM | DIASTOLIC BLOOD PRESSURE: 67 MMHG | SYSTOLIC BLOOD PRESSURE: 115 MMHG | WEIGHT: 120.4 LBS | HEIGHT: 63 IN | BODY MASS INDEX: 21.33 KG/M2

## 2018-07-02 DIAGNOSIS — Z34.80 SUPERVISION OF OTHER NORMAL PREGNANCY, ANTEPARTUM: Primary | ICD-10-CM

## 2018-07-02 PROCEDURE — 99207 ZZC PRENATAL VISIT: CPT | Performed by: OBSTETRICS & GYNECOLOGY

## 2018-07-02 NOTE — NURSING NOTE
"Initial /67 (BP Location: Right arm, Patient Position: Sitting, Cuff Size: Adult Regular)  Pulse 97  Temp 99.2  F (37.3  C) (Tympanic)  Resp 16  Ht 5' 3\" (1.6 m)  Wt 120 lb 6.4 oz (54.6 kg)  LMP 01/30/2018 (Approximate)  Breastfeeding? No  BMI 21.33 kg/m2 Estimated body mass index is 21.33 kg/(m^2) as calculated from the following:    Height as of this encounter: 5' 3\" (1.6 m).    Weight as of this encounter: 120 lb 6.4 oz (54.6 kg). .    Iliana Parker, [facility]    "

## 2018-07-02 NOTE — MR AVS SNAPSHOT
After Visit Summary   7/2/2018    María Briones    MRN: [id number]           Patient Information     Date Of Birth          1980        Visit Information        Provider Department      7/2/2018 4:00 PM Jayshree Garcia MD [facility]        Today's Diagnoses     Supervision of other normal pregnancy, antepartum    -  1       Follow-ups after your visit        Your next 10 appointments already scheduled     Aug 02, 2018  4:30 PM CDT   [facility]   [facility]    [address] [address]   [phone]              Future tests that were ordered for you today     Open Future Orders        Priority Expected Expires Ordered    Glucose tolerance gest screen 1 hour Routine  9/2/2018 7/2/2018    OB hemoglobin Routine  9/2/2018 7/2/2018    Treponema Abs w Reflex to RPR and Titer Routine  9/2/2018 7/2/2018            Who to contact     If you have questions or need follow up information about today's clinic visit or your schedule please contact [facility] directly at [phone].  Normal or non-critical lab and imaging results will be communicated to you by "[employer]"hart, letter or phone within 4 business days after the clinic has received the results. If you do not hear from us within 7 days, please contact the clinic through Go Kin Packst or phone. If you have a critical or abnormal lab result, we will notify you by phone as soon as possible.  Submit refill requests through Nutrabolt or call your pharmacy and they will forward the refill request to us. Please allow 3 business days for your refill to be completed.          Additional Information About Your Visit        MyChart Information     Nutrabolt gives you secure access to your electronic health record. If you see a primary care provider, you can also send messages to your care team and make appointments. If you have  "questions, please call your primary care clinic.  If you do not have a primary care provider, please call [phone] and they will assist you.        Care EveryWhere ID     This is your Care EveryWhere ID. This could be used by other organizations to access your [place] medical records  [id number]        Your Vitals Were     Pulse Temperature Respirations Height Last Period Breastfeeding?    97 99.2  F (37.3  C) (Tympanic) 16 5' 3\" (1.6 m) 01/30/2018 (Approximate) No    BMI (Body Mass Index)                   21.33 kg/m2            Blood Pressure from Last 3 Encounters:   07/02/18 115/67   06/04/18 114/66   05/23/18 123/72    Weight from Last 3 Encounters:   07/02/18 120 lb 6.4 oz (54.6 kg)   06/04/18 117 lb 6.4 oz (53.3 kg)   05/23/18 117 lb (53.1 kg)               Primary Care Provider Fax #    Physician No Ref-Primary [phone]       No address on file        Equal Access to Services     SHERLYN QURESHI : Hadii mando marsh hadasho Soomaali, waaxda luqadaha, qaybta kaalmada adeegyazoe, juan nazario . So [facility] [phone].    ATENCIÓN: Si habla español, tiene a laird disposición servicios gratuitos de asistencia lingüística. Llame al [phone].    We comply with applicable federal civil rights laws and [place] laws. We do not discriminate on the basis of race, color, national origin, age, disability, sex, sexual orientation, or gender identity.            Thank you!     Thank you for choosing [facility]  for your care. Our goal is always to provide you with excellent care. Hearing back from our patients is one way we can continue to improve our services. Please take a few minutes to complete the written survey that you may receive in the mail after your visit with us. Thank you!             Your Updated Medication List - Protect others around you: Learn how to safely use, store and throw away your medicines at www.disposemymeds.org.          This list is accurate as of " 7/2/18  4:32 PM.  Always use your most recent med list.                   Brand Name Dispense Instructions for use Diagnosis    prenatal multivitamin plus iron 27-0.8 MG Tabs per tablet      Take 1 tablet by mouth daily

## 2018-07-02 NOTE — PROGRESS NOTES
"CC: Here for routine prenatal visit @ 21w6d   HPI: + FM, no ctx, no LOF, no VB.  No complaints.     PE: /67 (BP Location: Right arm, Patient Position: Sitting, Cuff Size: Adult Regular)  Pulse 97  Temp 99.2  F (37.3  C) (Tympanic)  Resp 16  Ht 5' 3\" (1.6 m)  Wt 120 lb 6.4 oz (54.6 kg)  LMP 2018 (Approximate)  Breastfeeding? No  BMI 21.33 kg/m2   See OB flowsheet    Level 2 WNL    A/P  @ 21w6d normal pregnancy    1. Routine prenatal care.  Is still debating .  Will likely schedule for 39 weeks and if labors beforehand, will attempt .  Considering her options.    RTC 4 weeks.      Jayshree Garcia M.D.    "

## 2018-08-02 ENCOUNTER — PRENATAL OFFICE VISIT (OUTPATIENT)
Dept: OBGYN | Facility: CLINIC | Age: 38
End: 2018-08-02
Payer: COMMERCIAL

## 2018-08-02 VITALS
HEART RATE: 79 BPM | BODY MASS INDEX: 22.18 KG/M2 | HEIGHT: 63 IN | DIASTOLIC BLOOD PRESSURE: 56 MMHG | TEMPERATURE: 99 F | RESPIRATION RATE: 16 BRPM | SYSTOLIC BLOOD PRESSURE: 114 MMHG | WEIGHT: 125.2 LBS

## 2018-08-02 DIAGNOSIS — Z34.80 SUPERVISION OF OTHER NORMAL PREGNANCY, ANTEPARTUM: Primary | ICD-10-CM

## 2018-08-02 PROCEDURE — 99207 ZZC PRENATAL VISIT: CPT | Performed by: OBSTETRICS & GYNECOLOGY

## 2018-08-02 NOTE — MR AVS SNAPSHOT
After Visit Summary   8/2/2018    María Briones    MRN: [id number]           Patient Information     Date Of Birth          1980        Visit Information        Provider Department      8/2/2018 4:30 PM Jayshree Garcia MD [facility]        Today's Diagnoses     Supervision of other normal pregnancy, antepartum    -  1       Follow-ups after your visit        Your next 10 appointments already scheduled     Sep 07, 2018  9:00 AM CDT   [facility]   [facility]    [address] [address]   [phone]              Who to contact     If you have questions or need follow up information about today's clinic visit or your schedule please contact [facility] directly at [phone].  Normal or non-critical lab and imaging results will be communicated to you by MyChart, letter or phone within 4 business days after the clinic has received the results. If you do not hear from us within 7 days, please contact the clinic through Prognomixhart or phone. If you have a critical or abnormal lab result, we will notify you by phone as soon as possible.  Submit refill requests through [employer] or call your pharmacy and they will forward the refill request to us. Please allow 3 business days for your refill to be completed.          Additional Information About Your Visit        MyChart Information     [employer] gives you secure access to your electronic health record. If you see a primary care provider, you can also send messages to your care team and make appointments. If you have questions, please call your primary care clinic.  If you do not have a primary care provider, please call [phone] and they will assist you.        Care EveryWhere ID     This is your Care EveryWhere ID. This could be used by other organizations to access your [place] medical records  [id number]       "  Your Vitals Were     Pulse Temperature Respirations Height Last Period BMI (Body Mass Index)    79 99  F (37.2  C) (Tympanic) 16 5' 3\" (1.6 m) 01/30/2018 (Approximate) 22.18 kg/m2       Blood Pressure from Last 3 Encounters:   08/02/18 114/56   07/02/18 115/67   06/04/18 114/66    Weight from Last 3 Encounters:   08/02/18 125 lb 3.2 oz (56.8 kg)   07/02/18 120 lb 6.4 oz (54.6 kg)   06/04/18 117 lb 6.4 oz (53.3 kg)              Today, you had the following     No orders found for display       Primary Care Provider Fax #    Physician No Ref-Primary [phone]       No address on file        Equal Access to Services     SHERLYN QURESHI : Regi Jaeger, waritu mcguire, aparna kaalmazoe weaver, juan nazario . So [facility] [phone].    ATENCIÓN: Si habla español, tiene a laird disposición servicios gratuitos de asistencia lingüística. Llame al [phone].    We comply with applicable federal civil rights laws and [place] laws. We do not discriminate on the basis of race, color, national origin, age, disability, sex, sexual orientation, or gender identity.            Thank you!     Thank you for choosing [facility]  for your care. Our goal is always to provide you with excellent care. Hearing back from our patients is one way we can continue to improve our services. Please take a few minutes to complete the written survey that you may receive in the mail after your visit with us. Thank you!             Your Updated Medication List - Protect others around you: Learn how to safely use, store and throw away your medicines at www.disposemymeds.org.          This list is accurate as of 8/2/18  5:01 PM.  Always use your most recent med list.                   Brand Name Dispense Instructions for use Diagnosis    prenatal multivitamin plus iron 27-0.8 MG Tabs per tablet      Take 1 tablet by mouth daily          "

## 2018-08-02 NOTE — PROGRESS NOTES
"CC: Here for routine prenatal visit @ 26w2d   HPI: + FM, no ctx, no LOF, no VB.  No complaints.     PE: /56 (BP Location: Right arm, Patient Position: Chair, Cuff Size: Adult Regular)  Pulse 79  Temp 99  F (37.2  C) (Tympanic)  Resp 16  Ht 5' 3\" (1.6 m)  Wt 125 lb 3.2 oz (56.8 kg)  LMP 2018 (Approximate)  BMI 22.18 kg/m2   See OB flowsheet    A/P  @ 26w2d normal pregnancy    1. Routine prenatal care.  Syphilis is a sexually transmitted disease that can cause birth defects in the babies of untreated mothers. Every pregnant patient is tested for syphilis early in each pregnancy as part of the routine lab work. The [place] Department of Health has seen an increase in the rate of syphilis in [place]. The [facility] now recommends testing for syphilis 3 times during a pregnancy, the new prenatal visit, 28 weeks and when admitted for delivery. Patient declines lab testing for syphilis.    2. Hx : still considering TOLAC    RTC 4 weeks.      Jayshree Garcia M.D.    "

## 2018-08-02 NOTE — NURSING NOTE
"Chief Complaint   Patient presents with     Prenatal Care       Initial /56 (BP Location: Right arm, Patient Position: Chair, Cuff Size: Adult Regular)  Pulse 79  Temp 99  F (37.2  C) (Tympanic)  Resp 16  Ht 5' 3\" (1.6 m)  Wt 125 lb 3.2 oz (56.8 kg)  LMP 01/30/2018 (Approximate)  BMI 22.18 kg/m2 Estimated body mass index is 22.18 kg/(m^2) as calculated from the following:    Height as of this encounter: 5' 3\" (1.6 m).    Weight as of this encounter: 125 lb 3.2 oz (56.8 kg).  Medications and allergies reviewed.    Dominick CORDERO, CMA    "

## 2018-08-14 DIAGNOSIS — Z00.8 ENCOUNTER FOR BIOMETRIC SCREENING: ICD-10-CM

## 2018-08-14 DIAGNOSIS — Z34.80 SUPERVISION OF OTHER NORMAL PREGNANCY, ANTEPARTUM: ICD-10-CM

## 2018-08-14 LAB
CHOLEST SERPL-MCNC: 256 MG/DL
GLUCOSE 1H P 50 G GLC PO SERPL-MCNC: 91 MG/DL (ref 60–129)
HDLC SERPL-MCNC: 61 MG/DL
HGB BLD-MCNC: 10.3 G/DL (ref 11.7–15.7)
LDLC SERPL CALC-MCNC: 158 MG/DL
NONHDLC SERPL-MCNC: 195 MG/DL
TRIGL SERPL-MCNC: 184 MG/DL

## 2018-08-14 PROCEDURE — 00000218 ZZHCL STATISTIC OBHBG - HEMOGLOBIN: Performed by: OBSTETRICS & GYNECOLOGY

## 2018-08-14 PROCEDURE — 80061 LIPID PANEL: CPT | Performed by: OBSTETRICS & GYNECOLOGY

## 2018-08-14 PROCEDURE — 36415 COLL VENOUS BLD VENIPUNCTURE: CPT | Performed by: OBSTETRICS & GYNECOLOGY

## 2018-08-14 PROCEDURE — 82950 GLUCOSE TEST: CPT | Performed by: OBSTETRICS & GYNECOLOGY

## 2018-08-27 ENCOUNTER — PRENATAL OFFICE VISIT (OUTPATIENT)
Dept: OBGYN | Facility: CLINIC | Age: 38
End: 2018-08-27
Payer: COMMERCIAL

## 2018-08-27 VITALS
BODY MASS INDEX: 22.76 KG/M2 | HEART RATE: 104 BPM | DIASTOLIC BLOOD PRESSURE: 67 MMHG | TEMPERATURE: 98.5 F | WEIGHT: 128.5 LBS | OXYGEN SATURATION: 99 % | SYSTOLIC BLOOD PRESSURE: 112 MMHG

## 2018-08-27 DIAGNOSIS — Z34.80 SUPERVISION OF OTHER NORMAL PREGNANCY, ANTEPARTUM: Primary | ICD-10-CM

## 2018-08-27 DIAGNOSIS — Z23 NEED FOR TDAP VACCINATION: ICD-10-CM

## 2018-08-27 PROCEDURE — 99207 ZZC PRENATAL VISIT: CPT | Performed by: OBSTETRICS & GYNECOLOGY

## 2018-08-27 PROCEDURE — 90471 IMMUNIZATION ADMIN: CPT | Performed by: OBSTETRICS & GYNECOLOGY

## 2018-08-27 PROCEDURE — 90715 TDAP VACCINE 7 YRS/> IM: CPT | Performed by: OBSTETRICS & GYNECOLOGY

## 2018-08-27 NOTE — PROGRESS NOTES
María Briones is a 38 year old  who presents to assess mode of delivery.   Follows with Dr. Garcia.  She has had a previous LTCS.  Operative note is available and has been reviewed (double layer closure).  The procedure and the post operative course were uncomplicated.  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  Her current pregnancy is progressing well.  She is uncertain whether she wishes another child after this baby.  Please refer to the antepartum records for further details.     success rate calculated at 71%.        ASSESSMENT:  Previous .   Candidate for .       PLAN:  The options of  and RCS were discussed in detail with her.  We reviewed the risks and benefits of both.  We discussed the risks of repeat .  Operative risks and benefits were discussed with the patient and questions were answered.  These risks include but are not limited to anesthesia, injury to internal organs, uterine perforation, bleeding, infection, injury to baby.  We discussed the risks of , and in particular discussed the risk of uterine rupture and incidence.  We discussed the fact that this can be a catastrophic event for both the baby and the mother, requiring an immediate emergency RCS probably under general anesthesia, with associated increased operative risks.  We discussed the possible fetal damage or death, and the possible grave maternal risks, which can include hysterectomy.  We discussed the fact that we have in-house anesthesia, NICU, and OB personnel who can respond to these emergencies immediatedly at [place], but also discussed the fact that even under these conditions a good outcome for mother and baby cannot be guaranteed.  Her questions were answered in detail.     She is uncertain of desire at this time, will take the TOLAC consent form with her, and let us know if she does wish to transfer care here at 36  weeks in anticipation of TOLAC.  She is well-informed regarding her choices.  RTC for routine obstetrical care. She plans to see Dr. Garcia next week.      Willa Maguire MD

## 2018-08-27 NOTE — MR AVS SNAPSHOT
After Visit Summary   8/27/2018    María Briones    MRN: [id number]           Patient Information     Date Of Birth          1980        Visit Information        Provider Department      8/27/2018 1:00 PM Willa Maguire MD [facility]        Today's Diagnoses     Consultation for TOLAC    -  1    Need for Tdap vaccination           Follow-ups after your visit        Your next 10 appointments already scheduled     Sep 07, 2018  9:00 AM CDT   [facility]   [facility]    [address] [address]   [phone]              Who to contact     If you have questions or need follow up information about today's clinic visit or your schedule please contact [facility] directly at [phone].  Normal or non-critical lab and imaging results will be communicated to you by MyChart, letter or phone within 4 business days after the clinic has received the results. If you do not hear from us within 7 days, please contact the clinic through Modifyhart or phone. If you have a critical or abnormal lab result, we will notify you by phone as soon as possible.  Submit refill requests through Flexiant or call your pharmacy and they will forward the refill request to us. Please allow 3 business days for your refill to be completed.          Additional Information About Your Visit        MyChart Information     [employer] gives you secure access to your electronic health record. If you see a primary care provider, you can also send messages to your care team and make appointments. If you have questions, please call your primary care clinic.  If you do not have a primary care provider, please call [phone] and they will assist you.        Care EveryWhere ID     This is your Care EveryWhere ID. This could be used by other organizations to access your [facility]  records  [id number]        Your Vitals Were     Pulse Temperature Last Period Pulse Oximetry BMI (Body Mass Index)       104 98.5  F (36.9  C) (Oral) 01/30/2018 (Approximate) 99% 22.76 kg/m2        Blood Pressure from Last 3 Encounters:   08/27/18 112/67   08/02/18 114/56   07/02/18 115/67    Weight from Last 3 Encounters:   08/27/18 128 lb 8 oz (58.3 kg)   08/02/18 125 lb 3.2 oz (56.8 kg)   07/02/18 120 lb 6.4 oz (54.6 kg)              We Performed the Following     ADMIN 1st VACCINE     TDAP, IM (10 - 64 YRS) - Adacel        Primary Care Provider Fax #    Physician No Ref-Primary [phone]       No address on file        Equal Access to Services     SHERLYN QURESHI : Regi Jaeger, eleazar mcguire, aparna butleraladrienne weaver, juan nazario . So [facility] [phone].    ATENCIÓN: Si habla español, tiene a laird disposición servicios gratuitos de asistencia lingüística. Llame al [phone].    We comply with applicable federal civil rights laws and [place] laws. We do not discriminate on the basis of race, color, national origin, age, disability, sex, sexual orientation, or gender identity.            Thank you!     Thank you for choosing [facility]  for your care. Our goal is always to provide you with excellent care. Hearing back from our patients is one way we can continue to improve our services. Please take a few minutes to complete the written survey that you may receive in the mail after your visit with us. Thank you!             Your Updated Medication List - Protect others around you: Learn how to safely use, store and throw away your medicines at www.disposemymeds.org.          This list is accurate as of 8/27/18  1:41 PM.  Always use your most recent med list.                   Brand Name Dispense Instructions for use Diagnosis    prenatal multivitamin plus iron 27-0.8 MG Tabs per tablet      Take 1 tablet by mouth daily

## 2018-09-07 ENCOUNTER — TELEPHONE (OUTPATIENT)
Dept: OBGYN | Facility: CLINIC | Age: 38
End: 2018-09-07

## 2018-09-07 ENCOUNTER — PRENATAL OFFICE VISIT (OUTPATIENT)
Dept: OBGYN | Facility: CLINIC | Age: 38
End: 2018-09-07
Payer: COMMERCIAL

## 2018-09-07 VITALS
WEIGHT: 129.6 LBS | HEIGHT: 63 IN | TEMPERATURE: 98.8 F | SYSTOLIC BLOOD PRESSURE: 123 MMHG | DIASTOLIC BLOOD PRESSURE: 65 MMHG | RESPIRATION RATE: 16 BRPM | HEART RATE: 97 BPM | BODY MASS INDEX: 22.96 KG/M2

## 2018-09-07 DIAGNOSIS — Z98.891 H/O: C-SECTION: ICD-10-CM

## 2018-09-07 DIAGNOSIS — Z34.80 SUPERVISION OF OTHER NORMAL PREGNANCY, ANTEPARTUM: Primary | ICD-10-CM

## 2018-09-07 PROCEDURE — 99207 ZZC PRENATAL VISIT: CPT | Performed by: OBSTETRICS & GYNECOLOGY

## 2018-09-07 NOTE — PROGRESS NOTES
"CC: Here for routine prenatal visit @ 31w3d   HPI: + FM, no ctx, no LOF, no VB.  No complaints.     PE: /65 (BP Location: Right arm, Patient Position: Sitting, Cuff Size: Adult Regular)  Pulse 97  Temp 98.8  F (37.1  C)  Resp 16  Ht 5' 3\" (1.6 m)  Wt 129 lb 9.6 oz (58.8 kg)  LMP 2018 (Approximate)  Breastfeeding? No  BMI 22.96 kg/m2   See OB flowsheet    A/P  @ 31w3d normal pregnancy    1. Routine prenatal care  2. Prior : still considering TOLAC and has been seen by [facility].  Will scheduled  for 41 weeks for now.     RTC 2 weeks.      Jayshree Garcia M.D.    "

## 2018-09-07 NOTE — MR AVS SNAPSHOT
"              After Visit Summary   2018    María Briones    MRN: [id number]           Patient Information     Date Of Birth          1980        Visit Information        Provider Department      2018 9:00 AM Jayshree Garcia MD [facility]        Today's Diagnoses     Supervision of other normal pregnancy, antepartum    -  1    H/O:            Follow-ups after your visit        Who to contact     If you have questions or need follow up information about today's clinic visit or your schedule please contact [facility] directly at [phone].  Normal or non-critical lab and imaging results will be communicated to you by [employer], letter or phone within 4 business days after the clinic has received the results. If you do not hear from us within 7 days, please contact the clinic through [employer] or phone. If you have a critical or abnormal lab result, we will notify you by phone as soon as possible.  Submit refill requests through [employer] or call your pharmacy and they will forward the refill request to us. Please allow 3 business days for your refill to be completed.          Additional Information About Your Visit        MyChart Information     [employer] gives you secure access to your electronic health record. If you see a primary care provider, you can also send messages to your care team and make appointments. If you have questions, please call your primary care clinic.  If you do not have a primary care provider, please call [phone] and they will assist you.        Care EveryWhere ID     This is your Care EveryWhere ID. This could be used by other organizations to access your [place] medical records  [id number]        Your Vitals Were     Pulse Temperature Respirations Height Last Period Breastfeeding?    97 98.8  F (37.1  C) 16 5' 3\" (1.6 m) 2018 (Approximate) No    BMI (Body Mass Index)                   22.96 kg/m2            Blood Pressure from Last " 3 Encounters:   18 123/65   18 112/67   18 114/56    Weight from Last 3 Encounters:   18 129 lb 9.6 oz (58.8 kg)   18 128 lb 8 oz (58.3 kg)   18 125 lb 3.2 oz (56.8 kg)              We Performed the Following     Lachelle-Operative Worksheet  Section        Primary Care Provider Fax #    Physician No Ref-Primary [phone]       No address on file        Equal Access to Services     SHERLYN QURESHI : Hadii mando ku hadasho Soomaali, waaxda luqadaha, qaybta kaalmada adechachayazoe, juan nazario . So [facility] [phone].    ATENCIÓN: Si habla español, tiene a laird disposición servicios gratuitos de asistencia lingüística. Llame al [phone].    We comply with applicable federal civil rights laws and [place] laws. We do not discriminate on the basis of race, color, national origin, age, disability, sex, sexual orientation, or gender identity.            Thank you!     Thank you for choosing [facility]  for your care. Our goal is always to provide you with excellent care. Hearing back from our patients is one way we can continue to improve our services. Please take a few minutes to complete the written survey that you may receive in the mail after your visit with us. Thank you!             Your Updated Medication List - Protect others around you: Learn how to safely use, store and throw away your medicines at www.disposemymeds.org.          This list is accurate as of 18  9:31 AM.  Always use your most recent med list.                   Brand Name Dispense Instructions for use Diagnosis    prenatal multivitamin plus iron 27-0.8 MG Tabs per tablet      Take 1 tablet by mouth daily

## 2018-09-07 NOTE — NURSING NOTE
"Initial /65 (BP Location: Right arm, Patient Position: Sitting, Cuff Size: Adult Regular)  Pulse 97  Temp 98.8  F (37.1  C)  Resp 16  Ht 5' 3\" (1.6 m)  Wt 129 lb 9.6 oz (58.8 kg)  LMP 01/30/2018 (Approximate)  Breastfeeding? No  BMI 22.96 kg/m2 Estimated body mass index is 22.96 kg/(m^2) as calculated from the following:    Height as of this encounter: 5' 3\" (1.6 m).    Weight as of this encounter: 129 lb 9.6 oz (58.8 kg). .    Iliana Parker, [facility]    "

## 2018-09-07 NOTE — TELEPHONE ENCOUNTER
"  You are now scheduled for surgery at [facility].  Below are the details for your surgery.  Please read the \"Preparing for Your Surgery\" instructions and let us know if you have any questions.    Type of surgery:  Delivery  Surgeon:  Jayshree Garcia MD  Location of surgery: [facility]   Date of surgery: 18    Time: 9:00am   Arrival Time: 7:30am    Time can change, to be confirmed a couple of days prior by pre-op surgery nurse.    Pre-Op Appt Date: OB MD  Post-Op Appt Date: To be determined by provider     Packet sent out: Yes  Pre-cert/Authorization completed:  TBD by Financial Securing Office.   MA Sterilization/Hysterectomy Acknowledgment Consent signed: Not Applicable    [facility]  [phone]    Fax: [phone]  Same Day Surgery [phone]  Fax: [phone]  [facility] [phone]    "

## 2018-09-24 ENCOUNTER — PRENATAL OFFICE VISIT (OUTPATIENT)
Dept: OBGYN | Facility: CLINIC | Age: 38
End: 2018-09-24
Payer: COMMERCIAL

## 2018-09-24 VITALS
TEMPERATURE: 99.2 F | SYSTOLIC BLOOD PRESSURE: 113 MMHG | HEIGHT: 63 IN | BODY MASS INDEX: 23.07 KG/M2 | RESPIRATION RATE: 16 BRPM | WEIGHT: 130.2 LBS | HEART RATE: 86 BPM | DIASTOLIC BLOOD PRESSURE: 66 MMHG

## 2018-09-24 DIAGNOSIS — Z23 NEED FOR PROPHYLACTIC VACCINATION AND INOCULATION AGAINST INFLUENZA: ICD-10-CM

## 2018-09-24 DIAGNOSIS — Z34.80 SUPERVISION OF OTHER NORMAL PREGNANCY, ANTEPARTUM: Primary | ICD-10-CM

## 2018-09-24 PROCEDURE — 99207 ZZC PRENATAL VISIT: CPT | Performed by: OBSTETRICS & GYNECOLOGY

## 2018-09-24 PROCEDURE — 90686 IIV4 VACC NO PRSV 0.5 ML IM: CPT | Performed by: OBSTETRICS & GYNECOLOGY

## 2018-09-24 PROCEDURE — 90471 IMMUNIZATION ADMIN: CPT | Performed by: OBSTETRICS & GYNECOLOGY

## 2018-09-24 NOTE — PROGRESS NOTES
"CC: Here for routine prenatal visit @ 33w6d   HPI: + FM, irreg ctx, no LOF, no VB.  No complaints.     PE: /66 (BP Location: Right arm, Patient Position: Chair, Cuff Size: Adult Regular)  Pulse 86  Temp 99.2  F (37.3  C) (Tympanic)  Resp 16  Ht 5' 3\" (1.6 m)  Wt 130 lb 3.2 oz (59.1 kg)  LMP 2018 (Approximate)  Breastfeeding? No  BMI 23.06 kg/m2   See OB flowsheet    A/P  @ 33w6d normal pregnancy    1. Routine prenatal care.  Flu shot today.   2. TOLAC: still considering.  Has appt with U jose juan MTina    RTC 2 weeks.      Jayshree Garcia M.D.    "

## 2018-09-24 NOTE — MR AVS SNAPSHOT
After Visit Summary   9/24/2018    María Briones    MRN: [id number]           Patient Information     Date Of Birth          1980        Visit Information        Provider Department      9/24/2018 11:00 AM Jayshree Garcia MD [facility]        Today's Diagnoses     Supervision of other normal pregnancy, antepartum    -  1    Need for prophylactic vaccination and inoculation against influenza           Follow-ups after your visit        Your next 10 appointments already scheduled     Oct 09, 2018  3:15 PM CDT   [facility] OB-GYN Established Prenatal with Jayshree Garcia MD   [facility]    [address] [address]   [phone]            Nov 14, 2018   Procedure with Jayshree Garcia MD   [facility] PeriOP Services (--)    [address] [address]   [phone]           The medical center is located at [address]. (between [address] in [place], [place] north of [place]).              Who to contact     If you have questions or need follow up information about today's clinic visit or your schedule please contact [facility] directly at [phone].  Normal or non-critical lab and imaging results will be communicated to you by Medical Connectionshart, letter or phone within 4 business days after the clinic has received the results. If you do not hear from us within 7 days, please contact the clinic through [employer] or phone. If you have a critical or abnormal lab result, we will notify you by phone as soon as possible.  Submit refill requests through [employer] or call your pharmacy and they will forward the refill request to us. Please allow 3 business days for your refill to be completed.          Additional Information About Your Visit        MyChart Information     [employer] gives you secure access to your electronic health record. If you see a primary care provider, you can also  "send messages to your care team and make appointments. If you have questions, please call your primary care clinic.  If you do not have a primary care provider, please call [phone] and they will assist you.        Care EveryWhere ID     This is your Care EveryWhere ID. This could be used by other organizations to access your [place] medical records  [id number]        Your Vitals Were     Pulse Temperature Respirations Height Last Period Breastfeeding?    86 99.2  F (37.3  C) (Tympanic) 16 5' 3\" (1.6 m) 01/30/2018 (Approximate) No    BMI (Body Mass Index)                   23.06 kg/m2            Blood Pressure from Last 3 Encounters:   09/24/18 113/66   09/07/18 123/65   08/27/18 112/67    Weight from Last 3 Encounters:   09/24/18 130 lb 3.2 oz (59.1 kg)   09/07/18 129 lb 9.6 oz (58.8 kg)   08/27/18 128 lb 8 oz (58.3 kg)              We Performed the Following     FLU VACCINE, SPLIT VIRUS, IM (QUADRIVALENT) [07074]- >3 YRS     Vaccine Administration, Initial [81900]        Primary Care Provider Fax #    Physician No Ref-Primary [phone]       No address on file        Equal Access to Services     SHERLYN QURESHI : Regi herrerao Soosbaldo, waaxda luqadaha, qaybta kaalmada adeegyada, juan kennedy. So [facility] [phone].    ATENCIÓN: Si habla español, tiene a laird disposición servicios gratuitos de asistencia lingüística. Llame al [phone].    We comply with applicable federal civil rights laws and [place] laws. We do not discriminate on the basis of race, color, national origin, age, disability, sex, sexual orientation, or gender identity.            Thank you!     Thank you for choosing [facility]  for your care. Our goal is always to provide you with excellent care. Hearing back from our patients is one way we can continue to improve our services. Please take a few minutes to complete the written survey that you may receive in the mail after your visit with " us. Thank you!             Your Updated Medication List - Protect others around you: Learn how to safely use, store and throw away your medicines at www.disposemymeds.org.          This list is accurate as of 9/24/18 11:48 AM.  Always use your most recent med list.                   Brand Name Dispense Instructions for use Diagnosis    IRON SUPPLEMENT PO           prenatal multivitamin plus iron 27-0.8 MG Tabs per tablet      Take 1 tablet by mouth daily

## 2018-09-24 NOTE — PROGRESS NOTES
Prenatal Breastfeeding Education Toolkit provided for patient to review, helping her to make an informed decision on a feeding choice for her baby. Questions directed to the provider.  Declined at today's visit.  Bri Camejo, [facility]

## 2018-09-24 NOTE — NURSING NOTE
"Initial /66 (BP Location: Right arm, Patient Position: Chair, Cuff Size: Adult Regular)  Pulse 86  Temp 99.2  F (37.3  C) (Tympanic)  Resp 16  Ht 5' 3\" (1.6 m)  Wt 130 lb 3.2 oz (59.1 kg)  LMP 01/30/2018 (Approximate)  Breastfeeding? No  BMI 23.06 kg/m2 Estimated body mass index is 23.06 kg/(m^2) as calculated from the following:    Height as of this encounter: 5' 3\" (1.6 m).    Weight as of this encounter: 130 lb 3.2 oz (59.1 kg). .    Bri Camejo, [facility]    "

## 2018-09-24 NOTE — PROGRESS NOTES

## 2018-10-09 ENCOUNTER — PRENATAL OFFICE VISIT (OUTPATIENT)
Dept: OBGYN | Facility: CLINIC | Age: 38
End: 2018-10-09
Payer: COMMERCIAL

## 2018-10-09 VITALS
DIASTOLIC BLOOD PRESSURE: 64 MMHG | SYSTOLIC BLOOD PRESSURE: 118 MMHG | TEMPERATURE: 98.5 F | HEART RATE: 80 BPM | WEIGHT: 134 LBS | BODY MASS INDEX: 23.74 KG/M2 | RESPIRATION RATE: 16 BRPM | HEIGHT: 63 IN

## 2018-10-09 DIAGNOSIS — Z34.80 SUPERVISION OF OTHER NORMAL PREGNANCY, ANTEPARTUM: Primary | ICD-10-CM

## 2018-10-09 PROCEDURE — 87186 SC STD MICRODIL/AGAR DIL: CPT | Performed by: OBSTETRICS & GYNECOLOGY

## 2018-10-09 PROCEDURE — 87653 STREP B DNA AMP PROBE: CPT | Performed by: OBSTETRICS & GYNECOLOGY

## 2018-10-09 PROCEDURE — 99207 ZZC PRENATAL VISIT: CPT | Performed by: OBSTETRICS & GYNECOLOGY

## 2018-10-09 NOTE — PROGRESS NOTES
"CC: Here for routine prenatal visit @ 36w0d   HPI: + FM, no ctx, no LOF, no VB.  No complaints.     PE: /64 (BP Location: Right arm, Patient Position: Chair, Cuff Size: Adult Regular)  Pulse 80  Temp 98.5  F (36.9  C) (Tympanic)  Resp 16  Ht 5' 3\" (1.6 m)  Wt 134 lb (60.8 kg)  LMP 2018 (Approximate)  BMI 23.74 kg/m2   See OB flowsheet    GBS done    A/P  @ 36w0d normal pregnancy    1. Routine prenatal care    RTC 1 week    Jayshree Garcia M.D.    "

## 2018-10-09 NOTE — NURSING NOTE
"Chief Complaint   Patient presents with     Prenatal Care       Initial /64 (BP Location: Right arm, Patient Position: Chair, Cuff Size: Adult Regular)  Pulse 80  Temp 98.5  F (36.9  C) (Tympanic)  Resp 16  Ht 5' 3\" (1.6 m)  Wt 134 lb (60.8 kg)  LMP 01/30/2018 (Approximate)  BMI 23.74 kg/m2 Estimated body mass index is 23.74 kg/(m^2) as calculated from the following:    Height as of this encounter: 5' 3\" (1.6 m).    Weight as of this encounter: 134 lb (60.8 kg).  Medications and allergies reviewed.    Dominick CORDERO, [facility]    "

## 2018-10-09 NOTE — MR AVS SNAPSHOT
After Visit Summary   10/9/2018    María Briones    MRN: [id number]           Patient Information     Date Of Birth          1980        Visit Information        Provider Department      10/9/2018 3:15 PM Jayshree Garcia MD [facility]        Today's Diagnoses     Supervision of other normal pregnancy, antepartum    -  1       Follow-ups after your visit        Your next 10 appointments already scheduled     Oct 26, 2018  1:00 PM CDT   [facility] MD   [facility]    [address] [facility] [address] [phone]            Nov 14, 2018   Procedure with Jayshree Garcia MD   [facility] PeriOP Services (--)    [address] [address]   [phone]           The medical center is located at [address]. (between [address] in [place], four miles north of [place]).              Who to contact     If you have questions or need follow up information about today's clinic visit or your schedule please contact [facility] directly at [phone].  Normal or non-critical lab and imaging results will be communicated to you by MyChart, letter or phone within 4 business days after the clinic has received the results. If you do not hear from us within 7 days, please contact the clinic through [employer] or phone. If you have a critical or abnormal lab result, we will notify you by phone as soon as possible.  Submit refill requests through [employer] or call your pharmacy and they will forward the refill request to us. Please allow 3 business days for your refill to be completed.          Additional Information About Your Visit        MyChart Information     [employer] gives you secure access to your electronic health record. If you see a primary care provider, you can also send messages to your care team and make appointments. If you have  "questions, please call your primary care clinic.  If you do not have a primary care provider, please call [phone] and they will assist you.        Care EveryWhere ID     This is your Care EveryWhere ID. This could be used by other organizations to access your [place] medical records  [id number]        Your Vitals Were     Pulse Temperature Respirations Height Last Period BMI (Body Mass Index)    80 98.5  F (36.9  C) (Tympanic) 16 5' 3\" (1.6 m) 01/30/2018 (Approximate) 23.74 kg/m2       Blood Pressure from Last 3 Encounters:   10/09/18 118/64   09/24/18 113/66   09/07/18 123/65    Weight from Last 3 Encounters:   10/09/18 134 lb (60.8 kg)   09/24/18 130 lb 3.2 oz (59.1 kg)   09/07/18 129 lb 9.6 oz (58.8 kg)              We Performed the Following     Group B strep PCR        Primary Care Provider Fax #    Physician No Ref-Primary [phone]       No address on file        Equal Access to Services     [facility]: Hadii mando herrerao Soosbaldo, waaxda luqadaha, qaybta kaalmada adeegyazoe, juan nazario . So [facility] [phone].    ATENCIÓN: Si habla español, tiene a laird disposición servicios gratuitos de asistencia lingüística. Llame al [phone].    We comply with applicable federal civil rights laws and [place] laws. We do not discriminate on the basis of race, color, national origin, age, disability, sex, sexual orientation, or gender identity.            Thank you!     Thank you for choosing [facility]  for your care. Our goal is always to provide you with excellent care. Hearing back from our patients is one way we can continue to improve our services. Please take a few minutes to complete the written survey that you may receive in the mail after your visit with us. Thank you!             Your Updated Medication List - Protect others around you: Learn how to safely use, store and throw away your medicines at www.disposemymeds.org.          This list is " accurate as of 10/9/18  3:50 PM.  Always use your most recent med list.                   Brand Name Dispense Instructions for use Diagnosis    IRON SUPPLEMENT PO           prenatal multivitamin plus iron 27-0.8 MG Tabs per tablet      Take 1 tablet by mouth daily

## 2018-10-10 LAB
GP B STREP DNA SPEC QL NAA+PROBE: POSITIVE
SPECIMEN SOURCE: ABNORMAL

## 2018-10-11 PROBLEM — O99.820 GBS (GROUP B STREPTOCOCCUS CARRIER), +RV CULTURE, CURRENTLY PREGNANT: Status: ACTIVE | Noted: 2018-10-11

## 2018-10-13 LAB
BACTERIA SPEC CULT: ABNORMAL
SPECIMEN SOURCE: ABNORMAL

## 2018-10-16 ENCOUNTER — PRENATAL OFFICE VISIT (OUTPATIENT)
Dept: OBGYN | Facility: CLINIC | Age: 38
End: 2018-10-16
Payer: COMMERCIAL

## 2018-10-16 VITALS
SYSTOLIC BLOOD PRESSURE: 117 MMHG | WEIGHT: 132.8 LBS | TEMPERATURE: 98.1 F | RESPIRATION RATE: 18 BRPM | HEART RATE: 76 BPM | DIASTOLIC BLOOD PRESSURE: 68 MMHG | BODY MASS INDEX: 23.53 KG/M2 | HEIGHT: 63 IN

## 2018-10-16 DIAGNOSIS — Z34.80 SUPERVISION OF OTHER NORMAL PREGNANCY, ANTEPARTUM: Primary | ICD-10-CM

## 2018-10-16 PROCEDURE — 99207 ZZC PRENATAL VISIT: CPT | Performed by: OBSTETRICS & GYNECOLOGY

## 2018-10-16 NOTE — MR AVS SNAPSHOT
After Visit Summary   10/16/2018    María Briones    MRN: [id number]           Patient Information     Date Of Birth          1980        Visit Information        Provider Department      10/16/2018 11:00 AM Jayshree Garcia MD [facility]        Today's Diagnoses     Supervision of other normal pregnancy, antepartum    -  1       Follow-ups after your visit        Your next 10 appointments already scheduled     Oct 23, 2018  1:45 PM CDT   [facility] Established Prenatal with Jayshree Garcia MD   [facility] ([facility])    [address] [address]   [phone]            Oct 26, 2018  1:00 PM CDT   [facility] Established Prenatal with Jayshree Garcia MD   [facility] ([facility])    [address] [address]   [phone]            Nov 01, 2018  1:00 PM CDT   [facility] Established Prenatal with Jayshree Garcia MD   [facility] ([facility])    [address] [place] [address]   [phone]            Nov 09, 2018 11:30 AM CST   [facility] Established Prenatal with Jayshree Garcia MD   [facility] ([facility])    [address] [place] [address]   [phone]            Nov 14, 2018   Procedure with Jayshree Garcia MD   [facility] PeriOP Services (--)    [address] [place] [address]   [phone]           The medical center is located at [address] [place] [address]. (between [address] in [place], four miles north of [place]).              Who to contact     If you have questions or need follow up information about today's clinic visit or your schedule please contact [facility] directly at [phone].  Normal or non-critical lab and imaging results will be communicated to you by MyChart, letter or phone within 4 business days after the  "clinic has received the results. If you do not hear from us within 7 days, please contact the clinic through [employer] or phone. If you have a critical or abnormal lab result, we will notify you by phone as soon as possible.  Submit refill requests through [employer] or call your pharmacy and they will forward the refill request to us. Please allow 3 business days for your refill to be completed.          Additional Information About Your Visit        [employer] Information     [employer] gives you secure access to your electronic health record. If you see a primary care provider, you can also send messages to your care team and make appointments. If you have questions, please call your primary care clinic.  If you do not have a primary care provider, please call [phone] and they will assist you.        Care EveryWhere ID     This is your Care EveryWhere ID. This could be used by other organizations to access your [place] medical records  [id number]        Your Vitals Were     Pulse Temperature Respirations Height Last Period Breastfeeding?    76 98.1  F (36.7  C) (Tympanic) 18 5' 3\" (1.6 m) 01/30/2018 (Approximate) No    BMI (Body Mass Index)                   23.52 kg/m2            Blood Pressure from Last 3 Encounters:   10/16/18 117/68   10/09/18 118/64   09/24/18 113/66    Weight from Last 3 Encounters:   10/16/18 132 lb 12.8 oz (60.2 kg)   10/09/18 134 lb (60.8 kg)   09/24/18 130 lb 3.2 oz (59.1 kg)              Today, you had the following     No orders found for display       Primary Care Provider Fax #    Physician No Ref-Primary [phone]       No address on file        Equal Access to Services     [facility] : Hadii mando walsh Soosbaldo, waaxda luqadaha, qaybta kaalmada owen, juan kennedy. So [facility] [phone].    ATENCIÓN: Si habla español, tiene a laird disposición servicios gratuitos de asistencia lingüística. Llame al [phone].    We comply with applicable " federal civil rights laws and [place] laws. We do not discriminate on the basis of race, color, national origin, age, disability, sex, sexual orientation, or gender identity.            Thank you!     Thank you for choosing [facility]  for your care. Our goal is always to provide you with excellent care. Hearing back from our patients is one way we can continue to improve our services. Please take a few minutes to complete the written survey that you may receive in the mail after your visit with us. Thank you!             Your Updated Medication List - Protect others around you: Learn how to safely use, store and throw away your medicines at www.disposemymeds.org.          This list is accurate as of 10/16/18 11:36 AM.  Always use your most recent med list.                   Brand Name Dispense Instructions for use Diagnosis    IRON SUPPLEMENT PO           prenatal multivitamin plus iron 27-0.8 MG Tabs per tablet      Take 1 tablet by mouth daily

## 2018-10-16 NOTE — PROGRESS NOTES
"CC: Here for routine prenatal visit @ 37w0d   HPI: + FM, no ctx, no LOF, no VB.  Is very stressed due to upcoming house move (currently scheduled for ).  Is leaning toward planning repeat  instead of pursuing      PE: /68 (BP Location: Left arm, Patient Position: Chair, Cuff Size: Adult Regular)  Pulse 76  Temp 98.1  F (36.7  C) (Tympanic)  Resp 18  Ht 5' 3\" (1.6 m)  Wt 132 lb 12.8 oz (60.2 kg)  LMP 2018 (Approximate)  Breastfeeding? No  BMI 23.52 kg/m2   See OB flowsheet    Cervix very posterior    A/P  @ 37w0d normal pregnancy    1. Routine prenatal care.  Reviewed rationale for choosing to deliver after 39 weeks when scheduling .  However, we discussed that other normal pregnancies can go to 41+ weeks safely if she decides to keep her  date .  By waiting until 41 weeks, she runs the risk that she will go into spontaneous labor and then will have to decide [facility].  Discussed all options, and patient will continue to discuss with  regarding the timing of a planned delivery.    RTC 1 week    Jayshree Garcia M.D.    "

## 2018-10-16 NOTE — NURSING NOTE
"Initial /68 (BP Location: Left arm, Patient Position: Chair, Cuff Size: Adult Regular)  Pulse 76  Temp 98.1  F (36.7  C) (Tympanic)  Resp 18  Ht 5' 3\" (1.6 m)  Wt 132 lb 12.8 oz (60.2 kg)  LMP 01/30/2018 (Approximate)  Breastfeeding? No  BMI 23.52 kg/m2 Estimated body mass index is 23.52 kg/(m^2) as calculated from the following:    Height as of this encounter: 5' 3\" (1.6 m).    Weight as of this encounter: 132 lb 12.8 oz (60.2 kg). .    Bri Camejo, [facility]    "

## 2018-10-26 ENCOUNTER — PRENATAL OFFICE VISIT (OUTPATIENT)
Dept: OBGYN | Facility: CLINIC | Age: 38
End: 2018-10-26
Payer: COMMERCIAL

## 2018-10-26 VITALS
TEMPERATURE: 97.8 F | WEIGHT: 134.8 LBS | RESPIRATION RATE: 16 BRPM | HEIGHT: 63 IN | SYSTOLIC BLOOD PRESSURE: 110 MMHG | BODY MASS INDEX: 23.88 KG/M2 | DIASTOLIC BLOOD PRESSURE: 62 MMHG | HEART RATE: 83 BPM

## 2018-10-26 VITALS
TEMPERATURE: 98 F | BODY MASS INDEX: 24.23 KG/M2 | WEIGHT: 136.8 LBS | DIASTOLIC BLOOD PRESSURE: 74 MMHG | HEART RATE: 95 BPM | SYSTOLIC BLOOD PRESSURE: 126 MMHG

## 2018-10-26 DIAGNOSIS — O34.219 PREVIOUS CESAREAN DELIVERY, ANTEPARTUM CONDITION OR COMPLICATION: Primary | ICD-10-CM

## 2018-10-26 DIAGNOSIS — Z34.80 SUPERVISION OF OTHER NORMAL PREGNANCY, ANTEPARTUM: Primary | ICD-10-CM

## 2018-10-26 PROCEDURE — 99207 ZZC PRENATAL VISIT: CPT | Performed by: OBSTETRICS & GYNECOLOGY

## 2018-10-26 NOTE — MR AVS SNAPSHOT
After Visit Summary   10/26/2018    María Briones    MRN: [id number]           Patient Information     Date Of Birth          1980        Visit Information        Provider Department      10/26/2018 1:00 PM Jayshree Garcia MD [facility]        Today's Diagnoses     Supervision of other normal pregnancy, antepartum    -  1       Follow-ups after your visit        Your next 10 appointments already scheduled     Nov 01, 2018  1:00 PM CDT   Mat OB-GYN Established Prenatal with Jayshree Garcia MD   [facility]    [address] [place] [address]   [phone]            Nov 07, 2018   Procedure with Jayshree Garcia MD   [facility] (--)    [address] [place] [address]   [phone]           The medical center is located at [address] [place] [address]. ([address] [facility] [address] in [place], [place] miles north of [place]).            Nov 08, 2018  3:00 PM CST   Mat OB-GYN Established Prenatal with Jayshree Garcia MD   [facility]    [address] [facility] [place] [address]   [phone]            Nov 09, 2018 11:30 AM CST   Mat OB-GYN Established Prenatal with Jayshree Garcia MD   [facility]    [address] [facility] [place] [address]   [phone]              Who to contact     If you have questions or need follow up information about today's clinic visit or your schedule please contact [facility] directly at [phone].  Normal or non-critical lab and imaging results will be communicated to you by MyChart, letter or phone within 4 business days after the clinic has received the results. If you do not hear from us within 7 days, please contact the clinic through MyChart or phone. If you have a critical or abnormal lab result, we will notify you by phone as soon as  "possible.  Submit refill requests through Zakada or call your pharmacy and they will forward the refill request to us. Please allow 3 business days for your refill to be completed.          Additional Information About Your Visit        Nortal AShart Information     Zakada gives you secure access to your electronic health record. If you see a primary care provider, you can also send messages to your care team and make appointments. If you have questions, please call your primary care clinic.  If you do not have a primary care provider, please call [phone] and they will assist you.        Care EveryWhere ID     This is your Care EveryWhere ID. This could be used by other organizations to access your [place] medical records  [id number]        Your Vitals Were     Pulse Temperature Respirations Height Last Period Breastfeeding?    83 97.8  F (36.6  C) (Oral) 16 5' 3\" (1.6 m) 01/30/2018 (Approximate) No    BMI (Body Mass Index)                   23.88 kg/m2            Blood Pressure from Last 3 Encounters:   10/26/18 110/62   10/26/18 126/74   10/16/18 117/68    Weight from Last 3 Encounters:   10/26/18 134 lb 12.8 oz (61.1 kg)   10/26/18 136 lb 12.8 oz (62.1 kg)   10/16/18 132 lb 12.8 oz (60.2 kg)              Today, you had the following     No orders found for display       Primary Care Provider Fax #    Physician No Ref-Primary [phone]       No address on file        Equal Access to Services     TOBIAS QURESHI : Hadii aad ku hadasho Soomaali, waaxda luqadaha, qaybta kaalmada adeegyada, ujan nazario . So [facility] [phone].    ATENCIÓN: Si habla español, tiene a laird disposición servicios gratuitos de asistencia lingüística. Llame al [phone].    We comply with applicable federal civil rights laws and [place] laws. We do not discriminate on the basis of race, color, national origin, age, disability, sex, sexual orientation, or gender identity.            Thank you!     Thank you " for choosing [facility]  for your care. Our goal is always to provide you with excellent care. Hearing back from our patients is one way we can continue to improve our services. Please take a few minutes to complete the written survey that you may receive in the mail after your visit with us. Thank you!             Your Updated Medication List - Protect others around you: Learn how to safely use, store and throw away your medicines at www.disposemymeds.org.          This list is accurate as of 10/26/18  1:13 PM.  Always use your most recent med list.                   Brand Name Dispense Instructions for use Diagnosis    IRON SUPPLEMENT PO           prenatal multivitamin plus iron 27-0.8 MG Tabs per tablet      Take 1 tablet by mouth daily

## 2018-10-26 NOTE — PROGRESS NOTES
"CC: Here for routine prenatal visit @ 38w3d   HPI: + FM, no ctx, no LOF, no VB.  No complaints.     PE: /62 (BP Location: Right arm, Patient Position: Chair, Cuff Size: Adult Regular)  Pulse 83  Temp 97.8  F (36.6  C) (Oral)  Resp 16  Ht 5' 3\" (1.6 m)  Wt 134 lb 12.8 oz (61.1 kg)  LMP 2018 (Approximate)  Breastfeeding? No  BMI 23.88 kg/m2   See OB flowsheet    A/P  @ 38w3d normal pregnancy    1. Routine prenatal care  2. Prior : has decided to proceed with planned repeat  on 18    RTC 1 week    Jayshree Garcia M.D.    "

## 2018-10-26 NOTE — MR AVS SNAPSHOT
After Visit Summary   10/26/2018    María Briones    MRN: [id number]           Patient Information     Date Of Birth          1980        Visit Information        Provider Department      10/26/2018 11:00 AM Iliana Valle MD [facility]        Today's Diagnoses     Previous  delivery, antepartum condition or complication    -  1       Follow-ups after your visit        Your next 10 appointments already scheduled     Oct 26, 2018  1:00 PM CDT   Mat OB-GYN Established Prenatal with Jayshree Garcia MD   [facility]    [address] [place] [address]   [phone]            2018  1:00 PM CDT   Mat OB-GYN Established Prenatal with Jayshree Garcia MD   [facility]    [address] [place] [address]   [phone]            2018  3:00 PM CST   Mat OB-GYN Established Prenatal with Jayshree Garcia MD   [facility]    [address] [place] [address]   [phone]            2018 11:30 AM CST   Mat OB-GYN Established Prenatal with Jayshree Garcia MD   [facility]    [address] [place] [address]   [phone]            2018   Procedure with Jayshree Garcia MD   [facility] (--)    [address] [place] [address]   [phone]           The medical center is located at [address] [place] [address]. (between [address] in [place], four miles north of [place]).              Who to contact     If you have questions or need follow up information about today's clinic visit or your schedule please contact [facility] directly at [phone].  Normal or non-critical lab and imaging results will be communicated to you by MyChart, letter or phone within 4  business days after the clinic has received the results. If you do not hear from us within 7 days, please contact the clinic through Afrimarket or phone. If you have a critical or abnormal lab result, we will notify you by phone as soon as possible.  Submit refill requests through Afrimarket or call your pharmacy and they will forward the refill request to us. Please allow 3 business days for your refill to be completed.          Additional Information About Your Visit        YUPPTVharBrightLocker Information     Afrimarket gives you secure access to your electronic health record. If you see a primary care provider, you can also send messages to your care team and make appointments. If you have questions, please call your primary care clinic.  If you do not have a primary care provider, please call [phone] and they will assist you.        Care EveryWhere ID     This is your Care EveryWhere ID. This could be used by other organizations to access your [place] medical records  [id number]        Your Vitals Were     Pulse Temperature Last Period BMI (Body Mass Index)          95 98  F (36.7  C) (Oral) 01/30/2018 (Approximate) 24.23 kg/m2         Blood Pressure from Last 3 Encounters:   10/26/18 126/74   10/16/18 117/68   10/09/18 118/64    Weight from Last 3 Encounters:   10/26/18 136 lb 12.8 oz (62.1 kg)   10/16/18 132 lb 12.8 oz (60.2 kg)   10/09/18 134 lb (60.8 kg)              Today, you had the following     No orders found for display       Primary Care Provider Fax #    Physician No Ref-Primary [phone]       No address on file        Equal Access to Services     SHERLYN QURESHI : Hadii mando ku hadasho Soomaali, waaxda luqadaha, qaybta kaalmada adeegyada, juan nazario . So [facility] [phone].    ATENCIÓN: Si habla español, tiene a laird disposición servicios gratuitos de asistencia lingüística. Llame al [phone].    We comply with applicable federal civil rights laws and [place] laws. We do not  discriminate on the basis of race, color, national origin, age, disability, sex, sexual orientation, or gender identity.            Thank you!     Thank you for choosing [facility]  for your care. Our goal is always to provide you with excellent care. Hearing back from our patients is one way we can continue to improve our services. Please take a few minutes to complete the written survey that you may receive in the mail after your visit with us. Thank you!             Your Updated Medication List - Protect others around you: Learn how to safely use, store and throw away your medicines at www.disposemymeds.org.          This list is accurate as of 10/26/18 12:39 PM.  Always use your most recent med list.                   Brand Name Dispense Instructions for use Diagnosis    IRON SUPPLEMENT PO           prenatal multivitamin plus iron 27-0.8 MG Tabs per tablet      Take 1 tablet by mouth daily

## 2018-10-26 NOTE — TELEPHONE ENCOUNTER
Per pt's request and approved by Dr. Garcia - pt moved her c-sec up to 11-7-18 at 11:15am  SDS and pt informed.    -Yaneth White  [facility]

## 2018-10-26 NOTE — PROGRESS NOTES
38w3d here for visit to discuss TOLAC vs repeat c/s.  She reports doing well, no c/o.  Good fm. No regular ctx.  She feels likel she would like to just go ahead with scheduled c/s, but feels a little guilty about not really wanting to TOLAC.  Would like to go over risks again.  Risks and benefits of trial of labor after  section versus elective repeat  section were explained. The discussion included risk 1% of uterine rupture with a 0.5% chance of subsequent risk of fetal death or brain damage or significant maternal morbidity or death. I also explained risks of bleeding, infection, and potential damage to other organs associated with a repeat . If a trial of labor is chosen, we discussed the need for continuous monitoring and IV access in active labor.  All questions were answered.    I explained that she is a good candidate for TOLAC, but if she is not comfortable with that decision, she should opt for the c/s.  We did sign the consent today and she is aware if she opts for TOLAC, she should call or come in when in labor or with ROM.  If she decides upon arrival she does not want to TOLAC, we would perform her c/s at that time.  Questions answered.  yogi

## 2018-10-26 NOTE — NURSING NOTE
"Initial /62 (BP Location: Right arm, Patient Position: Chair, Cuff Size: Adult Regular)  Pulse 83  Temp 97.8  F (36.6  C) (Oral)  Resp 16  Ht 5' 3\" (1.6 m)  Wt 134 lb 12.8 oz (61.1 kg)  LMP 01/30/2018 (Approximate)  Breastfeeding? No  BMI 23.88 kg/m2 Estimated body mass index is 23.88 kg/(m^2) as calculated from the following:    Height as of this encounter: 5' 3\" (1.6 m).    Weight as of this encounter: 134 lb 12.8 oz (61.1 kg). .    Bri Camejo, ALTAGRACIA    "

## 2018-11-01 ENCOUNTER — PRENATAL OFFICE VISIT (OUTPATIENT)
Dept: OBGYN | Facility: CLINIC | Age: 38
End: 2018-11-01
Payer: COMMERCIAL

## 2018-11-01 VITALS
HEART RATE: 86 BPM | WEIGHT: 135.4 LBS | DIASTOLIC BLOOD PRESSURE: 62 MMHG | BODY MASS INDEX: 23.99 KG/M2 | SYSTOLIC BLOOD PRESSURE: 123 MMHG | RESPIRATION RATE: 16 BRPM | HEIGHT: 63 IN | TEMPERATURE: 98.7 F

## 2018-11-01 DIAGNOSIS — Z34.80 SUPERVISION OF OTHER NORMAL PREGNANCY, ANTEPARTUM: Primary | ICD-10-CM

## 2018-11-01 PROCEDURE — 99207 ZZC PRENATAL VISIT: CPT | Performed by: OBSTETRICS & GYNECOLOGY

## 2018-11-01 NOTE — NURSING NOTE
"Initial /62 (BP Location: Right arm, Patient Position: Chair, Cuff Size: Adult Regular)  Pulse 86  Temp 98.7  F (37.1  C) (Tympanic)  Resp 16  Ht 5' 3\" (1.6 m)  Wt 135 lb 6.4 oz (61.4 kg)  LMP 01/30/2018 (Approximate)  Breastfeeding? No  BMI 23.99 kg/m2 Estimated body mass index is 23.99 kg/(m^2) as calculated from the following:    Height as of this encounter: 5' 3\" (1.6 m).    Weight as of this encounter: 135 lb 6.4 oz (61.4 kg). .    Bri Camejo, [facility]    "

## 2018-11-01 NOTE — PROGRESS NOTES
CC: Here for routine prenatal visit @ 39w2d   HPI: + FM, no ctx, no LOF, no VB.  No complaints.     PE: LMP 2018 (Approximate)   See OB flowsheet    A/P  @ 39w2d normal pregnancy    1. Routine prenatal care  2.  planned for 18      PRE-OP EVALUATION:  Today's date: 2018    María Briones (: 1980) presents for pre-operative evaluation. She requires evaluation and anesthesia risk assessment prior to undergoing surgery/procedure for treatment of prior  .    Proposed Surgery/ Procedure: repeat   Date of Surgery/ Procedure: 18  Time of Surgery/ Procedure: [facility]  Hospital/Surgical Facility: [facility]  Primary Physician: No Ref-Primary, Physician  Type of Anesthesia Anticipated: Spinal    Patient has a Health Care Directive or Living Will:  NO    1. NO - Do you have a history of heart attack, stroke, stent, bypass or surgery on an artery in the head, neck, heart or legs?  2. NO - Do you ever have any pain or discomfort in your chest?  3. NO - Do you have a history of  Heart Failure?  4. NO - Are you troubled by shortness of breath when: walking on the level, up a slight hill or at night?  5. NO - Do you currently have a cold, bronchitis or other respiratory infection?  6. NO - Do you have a cough, shortness of breath or wheezing?  7. NO - Do you sometimes get pains in the calves of your legs when you walk?  9. YES - DO YOU OR DOES ANYONE IN YOUR FAMILY HAVE A SERIOUS BLEEDING PROBLEM SUCH AS PROLONGED BLEEDING FOLLOWING SURGERIES OR CUTS? Father--related to surgeries  9. NO - Do you or does anyone in your family have a serious bleeding problem such as prolonged bleeding following surgeries or cuts?  11. Yes- anemia in pregnancy  11. NO - Have you had any abnormal blood loss such as black, tarry or bloody stools, or abnormal vaginal bleeding?  12. NO - Have you ever had a blood transfusion?  13. NO - Have you or any of your relatives ever had problems with  anesthesia?  14. YES - DO YOU HAVE SLEEP APNEA, EXCESSIVE SNORING OR DAYTIME DROWSINESS? Snores but not excessively  15. NO - Do you have any prosthetic heart valves?  16. NO - Do you have prosthetic joints?  17. NO - Is there any chance that you may be pregnant?      HPI:     HPI related to upcoming procedure: 39 yo  @ 39w2d planning on repeat  on 18      See problem list for active medical problems.  Problems all longstanding and stable, except as noted/documented.  See ROS for pertinent symptoms related to these conditions.                                                                                                                                                          .    MEDICAL HISTORY:     Patient Active Problem List    Diagnosis Date Noted     GBS (group B Streptococcus carrier), +RV culture, currently pregnant 10/11/2018     Priority: High     Supervision of other normal pregnancy, antepartum 2018     Priority: High     Spontaneous conception after 3 failed embryo transfers in        Need for Tdap vaccination 2018     Priority: Medium     TDAP GIVEN       Other specified hypothyroidism 2018     Priority: Medium     H/O:  2018     Priority: Medium     Antepartum multigravida of advanced maternal age 2018     Priority: Medium     Female infertility 2014     Priority: Medium     History of Stage III endometriosis by laparoscopy on 4/15/14    Prior Successful IVF       Dysmenorrhea 2014     Priority: Medium     Irregular periods 2013     Priority: Medium      Past Medical History:   Diagnosis Date     Chickenpox      Palpitations 2008     Urinary tract bacterial infections     as chold     Past Surgical History:   Procedure Laterality Date     C ORAL SURGERY PROCEDURE      wisdom teeth      SECTION N/A 10/12/2015    Procedure:  SECTION;  Surgeon: Jayshree Garcia MD;  Location: [facility]     LAPAROSCOPIC  "ABLATION ENDOMETRIOSIS  4/15/2014    Procedure: LAPAROSCOPIC ABLATION ENDOMETRIOSIS;  Surgeon: Mohamud Osorio MD;  Location: [facility]     LAPAROSCOPIC TUBAL DYE STUDY  4/15/2014    Procedure: LAPAROSCOPIC TUBAL DYE STUDY;  Surgeon: Mohamud Osorio MD;  Location: [facility]     LAPAROSCOPY DIAGNOSTIC (GYN)  4/15/2014    Procedure: Operative Laparoscopy, Excision Cauterization Of Endometriosis, Chromopertubation ;  Surgeon: Mohamud Osorio MD;  Location: [facility] OR     Current Outpatient Prescriptions   Medication Sig Dispense Refill     Ferrous Sulfate (IRON SUPPLEMENT PO)        Prenatal Vit-Fe Fumarate-FA (PRENATAL MULTIVITAMIN  PLUS IRON) 27-0.8 MG TABS Take 1 tablet by mouth daily       OTC products: None, except as noted above    No Known Allergies   Latex Allergy: NO    Social History   Substance Use Topics     Smoking status: Never Smoker     Smokeless tobacco: Never Used     Alcohol use Yes      Comment: occas- quit with pregnancy     History   Drug Use No       REVIEW OF SYSTEMS:   CONSTITUTIONAL: NEGATIVE for fever, chills, change in weight  ENT/MOUTH: NEGATIVE for ear, mouth and throat problems  RESP: NEGATIVE for significant cough or SOB  CV: NEGATIVE for chest pain, palpitations or peripheral edema    EXAM:   /62 (BP Location: Right arm, Patient Position: Chair, Cuff Size: Adult Regular)  Pulse 86  Temp 98.7  F (37.1  C) (Tympanic)  Resp 16  Ht 5' 3\" (1.6 m)  Wt 135 lb 6.4 oz (61.4 kg)  LMP 01/30/2018 (Approximate)  Breastfeeding? No  BMI 23.99 kg/m2  GENERAL APPEARANCE: healthy, alert and no distress  RESP: lungs clear to auscultation - no rales, rhonchi or wheezes  CV: regular rate and rhythm, normal S1 S2, no S3 or S4 and no murmur, click or rub   ABDOMEN: soft, nontender, no HSM or masses and bowel sounds normal  NEURO: Normal strength and tone, sensory exam grossly normal, mentation intact and speech normal    DIAGNOSTICS:   Hemoglobin (indicated for history of anemia or procedure with " significant blood loss such as tonsillectomy, major intraperitoneal surgery, vascular surgery, major spine surgery, total joint replacement)    Recent Labs   Lab Test  18   0816  18   1442  18   0752   HGB  10.3*  13.0  13.6   PLT   --   285  405   A1C   --    --   5.2        IMPRESSION:   Reason for surgery/procedure: prior     The proposed surgical procedure is considered LOW risk.    REVISED CARDIAC RISK INDEX  The patient has the following serious cardiovascular risks for perioperative complications such as (MI, PE, VFib and 3  AV Block):  No serious cardiac risks  INTERPRETATION: 1 risks: Class II (low risk - 0.9% complication rate)    The patient has the following additional risks for perioperative complications:  No identified additional risks      ICD-10-CM    1. Supervision of other normal pregnancy, antepartum Z34.80        RECOMMENDATIONS:       --Patient is to take all scheduled medications on the day of surgery EXCEPT for modifications listed below.    APPROVAL GIVEN to proceed with proposed procedure, without further diagnostic evaluation       Signed Electronically by: Jayshree Garcia MD    Copy of this evaluation report is provided to requesting physician.    [facility] Preop Guidelines    Revised Cardiac Risk Index

## 2018-11-01 NOTE — MR AVS SNAPSHOT
After Visit Summary   11/1/2018    María Briones    MRN: [id number]           Patient Information     Date Of Birth          1980        Visit Information        Provider Department      11/1/2018 1:00 PM Jayshree Garcia MD [facility]        Today's Diagnoses     Supervision of other normal pregnancy, antepartum    -  1       Follow-ups after your visit        Your next 10 appointments already scheduled     Nov 07, 2018   Procedure with Jayshree Garcia MD   [facility] (--)    [address] [place] [address]   [phone]           The medical center is located at [address] [place] [address]. (between [address] in [place], [place] north of [place]).            Nov 08, 2018  3:00 PM CST   [facility] Established Prenatal with Jayshree Garcia MD   [facility]    [address] [place] [address]   [phone]            Nov 09, 2018 11:30 AM CST   [facility] Established Prenatal with Jayshree Garcia MD   [facility]    [address] [place] [address]   [phone]              Who to contact     If you have questions or need follow up information about today's clinic visit or your schedule please contact [facility] directly at [phone].  Normal or non-critical lab and imaging results will be communicated to you by MyChart, letter or phone within 4 business days after the clinic has received the results. If you do not hear from us within 7 days, please contact the clinic through MyChart or phone. If you have a critical or abnormal lab result, we will notify you by phone as soon as possible.  Submit refill requests through [email or web address] or call your pharmacy and they will forward the refill request to us. Please allow 3 business days for your refill to be completed.          Additional Information About Your Visit    "     MyChart Information     rimidit gives you secure access to your electronic health record. If you see a primary care provider, you can also send messages to your care team and make appointments. If you have questions, please call your primary care clinic.  If you do not have a primary care provider, please call [phone] and they will assist you.        Care EveryWhere ID     This is your Care EveryWhere ID. This could be used by other organizations to access your [facility] medical records  [id number]        Your Vitals Were     Pulse Temperature Respirations Height Last Period Breastfeeding?    86 98.7  F (37.1  C) (Tympanic) 16 5' 3\" (1.6 m) 01/30/2018 (Approximate) No    BMI (Body Mass Index)                   23.99 kg/m2            Blood Pressure from Last 3 Encounters:   11/01/18 123/62   10/26/18 110/62   10/26/18 126/74    Weight from Last 3 Encounters:   11/01/18 135 lb 6.4 oz (61.4 kg)   10/26/18 134 lb 12.8 oz (61.1 kg)   10/26/18 136 lb 12.8 oz (62.1 kg)              Today, you had the following     No orders found for display       Primary Care Provider Fax #    Physician No Ref-Primary [phone]       No address on file        Equal Access to Services     SHERLYN QURESHI : Hadii mando ku hadasho Soomaali, waaxda luqadaha, qaybta kaalmada adeegyada, waxay sarah nazario . So [facility] [phone].    ATENCIÓN: Si habla español, tiene a laird disposición servicios gratuitos de asistencia lingüística. Llame al [phone].    We comply with applicable federal civil rights laws and [place] laws. We do not discriminate on the basis of race, color, national origin, age, disability, sex, sexual orientation, or gender identity.            Thank you!     Thank you for choosing [facility]  for your care. Our goal is always to provide you with excellent care. Hearing back from our patients is one way we can continue to improve our services. Please take a few minutes to complete " the written survey that you may receive in the mail after your visit with us. Thank you!             Your Updated Medication List - Protect others around you: Learn how to safely use, store and throw away your medicines at www.disposemymeds.org.          This list is accurate as of 11/1/18  1:25 PM.  Always use your most recent med list.                   Brand Name Dispense Instructions for use Diagnosis    IRON SUPPLEMENT PO           prenatal multivitamin plus iron 27-0.8 MG Tabs per tablet      Take 1 tablet by mouth daily

## 2018-11-07 ENCOUNTER — SURGERY (OUTPATIENT)
Age: 38
End: 2018-11-07

## 2018-11-07 ENCOUNTER — ANESTHESIA (OUTPATIENT)
Dept: SURGERY | Facility: CLINIC | Age: 38
End: 2018-11-07
Payer: COMMERCIAL

## 2018-11-07 ENCOUNTER — HOSPITAL ENCOUNTER (INPATIENT)
Facility: CLINIC | Age: 38
LOS: 2 days | Discharge: HOME | End: 2018-11-09
Attending: OBSTETRICS & GYNECOLOGY | Admitting: OBSTETRICS & GYNECOLOGY
Payer: COMMERCIAL

## 2018-11-07 ENCOUNTER — ANESTHESIA EVENT (OUTPATIENT)
Dept: SURGERY | Facility: CLINIC | Age: 38
End: 2018-11-07
Payer: COMMERCIAL

## 2018-11-07 DIAGNOSIS — Z98.891 H/O: C-SECTION: Primary | ICD-10-CM

## 2018-11-07 LAB
ABO + RH BLD: NORMAL
ABO + RH BLD: NORMAL
BLD GP AB SCN SERPL QL: NORMAL
BLOOD BANK CMNT PATIENT-IMP: NORMAL
HGB BLD-MCNC: 11 G/DL (ref 11.7–15.7)
SPECIMEN EXP DATE BLD: NORMAL

## 2018-11-07 PROCEDURE — 25000132 ZZH RX MED GY IP 250 OP 250 PS 637: Performed by: OBSTETRICS & GYNECOLOGY

## 2018-11-07 PROCEDURE — 37000009 ZZH ANESTHESIA TECHNICAL FEE, EACH ADDTL 15 MIN: Performed by: OBSTETRICS & GYNECOLOGY

## 2018-11-07 PROCEDURE — 27210794 ZZH OR GENERAL SUPPLY STERILE: Performed by: OBSTETRICS & GYNECOLOGY

## 2018-11-07 PROCEDURE — 12000031 ZZH R&B OB CRITICAL

## 2018-11-07 PROCEDURE — 25000128 H RX IP 250 OP 636: Performed by: NURSE ANESTHETIST, CERTIFIED REGISTERED

## 2018-11-07 PROCEDURE — 36000058 ZZH SURGERY LEVEL 3 EA 15 ADDTL MIN: Performed by: OBSTETRICS & GYNECOLOGY

## 2018-11-07 PROCEDURE — 25000128 H RX IP 250 OP 636: Performed by: OBSTETRICS & GYNECOLOGY

## 2018-11-07 PROCEDURE — 86780 TREPONEMA PALLIDUM: CPT | Performed by: OBSTETRICS & GYNECOLOGY

## 2018-11-07 PROCEDURE — 86900 BLOOD TYPING SEROLOGIC ABO: CPT | Performed by: OBSTETRICS & GYNECOLOGY

## 2018-11-07 PROCEDURE — 37000008 ZZH ANESTHESIA TECHNICAL FEE, 1ST 30 MIN: Performed by: OBSTETRICS & GYNECOLOGY

## 2018-11-07 PROCEDURE — 59510 CESAREAN DELIVERY: CPT | Performed by: OBSTETRICS & GYNECOLOGY

## 2018-11-07 PROCEDURE — 25000125 ZZHC RX 250: Performed by: OBSTETRICS & GYNECOLOGY

## 2018-11-07 PROCEDURE — 25000125 ZZHC RX 250: Performed by: NURSE ANESTHETIST, CERTIFIED REGISTERED

## 2018-11-07 PROCEDURE — 71000013 ZZH RECOVERY PHASE 1 LEVEL 1 EA ADDTL HR: Performed by: OBSTETRICS & GYNECOLOGY

## 2018-11-07 PROCEDURE — 85018 HEMOGLOBIN: CPT | Performed by: OBSTETRICS & GYNECOLOGY

## 2018-11-07 PROCEDURE — 36000056 ZZH SURGERY LEVEL 3 1ST 30 MIN: Performed by: OBSTETRICS & GYNECOLOGY

## 2018-11-07 PROCEDURE — 86850 RBC ANTIBODY SCREEN: CPT | Performed by: OBSTETRICS & GYNECOLOGY

## 2018-11-07 PROCEDURE — 86901 BLOOD TYPING SEROLOGIC RH(D): CPT | Performed by: OBSTETRICS & GYNECOLOGY

## 2018-11-07 PROCEDURE — 36415 COLL VENOUS BLD VENIPUNCTURE: CPT | Performed by: OBSTETRICS & GYNECOLOGY

## 2018-11-07 PROCEDURE — 71000012 ZZH RECOVERY PHASE 1 LEVEL 1 FIRST HR: Performed by: OBSTETRICS & GYNECOLOGY

## 2018-11-07 PROCEDURE — 27110028 ZZH OR GENERAL SUPPLY NON-STERILE: Performed by: OBSTETRICS & GYNECOLOGY

## 2018-11-07 RX ORDER — SODIUM CHLORIDE, SODIUM LACTATE, POTASSIUM CHLORIDE, CALCIUM CHLORIDE 600; 310; 30; 20 MG/100ML; MG/100ML; MG/100ML; MG/100ML
INJECTION, SOLUTION INTRAVENOUS CONTINUOUS PRN
Status: DISCONTINUED | OUTPATIENT
Start: 2018-11-07 | End: 2018-11-07

## 2018-11-07 RX ORDER — KETOROLAC TROMETHAMINE 30 MG/ML
30 INJECTION, SOLUTION INTRAMUSCULAR; INTRAVENOUS
Status: DISCONTINUED | OUTPATIENT
Start: 2018-11-07 | End: 2018-11-07

## 2018-11-07 RX ORDER — FENTANYL CITRATE 50 UG/ML
25-50 INJECTION, SOLUTION INTRAMUSCULAR; INTRAVENOUS
Status: DISCONTINUED | OUTPATIENT
Start: 2018-11-07 | End: 2018-11-09 | Stop reason: HOSPADM

## 2018-11-07 RX ORDER — ONDANSETRON 2 MG/ML
4 INJECTION INTRAMUSCULAR; INTRAVENOUS EVERY 6 HOURS PRN
Status: DISCONTINUED | OUTPATIENT
Start: 2018-11-07 | End: 2018-11-09 | Stop reason: HOSPADM

## 2018-11-07 RX ORDER — ACETAMINOPHEN 325 MG/1
650 TABLET ORAL EVERY 4 HOURS PRN
Status: DISCONTINUED | OUTPATIENT
Start: 2018-11-10 | End: 2018-11-09 | Stop reason: HOSPADM

## 2018-11-07 RX ORDER — CITRIC ACID/SODIUM CITRATE 334-500MG
30 SOLUTION, ORAL ORAL
Status: COMPLETED | OUTPATIENT
Start: 2018-11-07 | End: 2018-11-07

## 2018-11-07 RX ORDER — DIPHENHYDRAMINE HYDROCHLORIDE 50 MG/ML
25 INJECTION INTRAMUSCULAR; INTRAVENOUS EVERY 6 HOURS PRN
Status: DISCONTINUED | OUTPATIENT
Start: 2018-11-07 | End: 2018-11-09 | Stop reason: HOSPADM

## 2018-11-07 RX ORDER — OXYCODONE HYDROCHLORIDE 5 MG/1
5-10 TABLET ORAL
Status: DISCONTINUED | OUTPATIENT
Start: 2018-11-07 | End: 2018-11-09 | Stop reason: HOSPADM

## 2018-11-07 RX ORDER — CEFAZOLIN SODIUM 2 G/100ML
2 INJECTION, SOLUTION INTRAVENOUS
Status: COMPLETED | OUTPATIENT
Start: 2018-11-07 | End: 2018-11-07

## 2018-11-07 RX ORDER — IBUPROFEN 800 MG/1
800 TABLET, FILM COATED ORAL EVERY 6 HOURS PRN
Status: DISCONTINUED | OUTPATIENT
Start: 2018-11-07 | End: 2018-11-07

## 2018-11-07 RX ORDER — ONDANSETRON 2 MG/ML
INJECTION INTRAMUSCULAR; INTRAVENOUS PRN
Status: DISCONTINUED | OUTPATIENT
Start: 2018-11-07 | End: 2018-11-07

## 2018-11-07 RX ORDER — DIPHENHYDRAMINE HYDROCHLORIDE 50 MG/ML
25 INJECTION INTRAMUSCULAR; INTRAVENOUS EVERY 6 HOURS PRN
Status: DISCONTINUED | OUTPATIENT
Start: 2018-11-07 | End: 2018-11-07

## 2018-11-07 RX ORDER — LIDOCAINE 40 MG/G
CREAM TOPICAL
Status: DISCONTINUED | OUTPATIENT
Start: 2018-11-07 | End: 2018-11-07

## 2018-11-07 RX ORDER — CEFAZOLIN SODIUM 1 G/50ML
1 INJECTION, SOLUTION INTRAVENOUS SEE ADMIN INSTRUCTIONS
Status: DISCONTINUED | OUTPATIENT
Start: 2018-11-07 | End: 2018-11-07

## 2018-11-07 RX ORDER — AMOXICILLIN 250 MG
2 CAPSULE ORAL 2 TIMES DAILY PRN
Status: DISCONTINUED | OUTPATIENT
Start: 2018-11-07 | End: 2018-11-09 | Stop reason: HOSPADM

## 2018-11-07 RX ORDER — ONDANSETRON 4 MG/1
4 TABLET, ORALLY DISINTEGRATING ORAL EVERY 30 MIN PRN
Status: DISCONTINUED | OUTPATIENT
Start: 2018-11-07 | End: 2018-11-07

## 2018-11-07 RX ORDER — BISACODYL 10 MG
10 SUPPOSITORY, RECTAL RECTAL DAILY PRN
Status: DISCONTINUED | OUTPATIENT
Start: 2018-11-09 | End: 2018-11-09 | Stop reason: HOSPADM

## 2018-11-07 RX ORDER — SODIUM CHLORIDE, SODIUM LACTATE, POTASSIUM CHLORIDE, CALCIUM CHLORIDE 600; 310; 30; 20 MG/100ML; MG/100ML; MG/100ML; MG/100ML
INJECTION, SOLUTION INTRAVENOUS CONTINUOUS
Status: DISCONTINUED | OUTPATIENT
Start: 2018-11-07 | End: 2018-11-09 | Stop reason: HOSPADM

## 2018-11-07 RX ORDER — DEXTROSE, SODIUM CHLORIDE, SODIUM LACTATE, POTASSIUM CHLORIDE, AND CALCIUM CHLORIDE 5; .6; .31; .03; .02 G/100ML; G/100ML; G/100ML; G/100ML; G/100ML
INJECTION, SOLUTION INTRAVENOUS CONTINUOUS
Status: DISCONTINUED | OUTPATIENT
Start: 2018-11-07 | End: 2018-11-09 | Stop reason: HOSPADM

## 2018-11-07 RX ORDER — DEXAMETHASONE SODIUM PHOSPHATE 4 MG/ML
INJECTION, SOLUTION INTRA-ARTICULAR; INTRALESIONAL; INTRAMUSCULAR; INTRAVENOUS; SOFT TISSUE PRN
Status: DISCONTINUED | OUTPATIENT
Start: 2018-11-07 | End: 2018-11-07

## 2018-11-07 RX ORDER — OXYTOCIN 10 [USP'U]/ML
10 INJECTION, SOLUTION INTRAMUSCULAR; INTRAVENOUS
Status: DISCONTINUED | OUTPATIENT
Start: 2018-11-07 | End: 2018-11-09 | Stop reason: HOSPADM

## 2018-11-07 RX ORDER — ONDANSETRON 2 MG/ML
4 INJECTION INTRAMUSCULAR; INTRAVENOUS EVERY 30 MIN PRN
Status: DISCONTINUED | OUTPATIENT
Start: 2018-11-07 | End: 2018-11-07

## 2018-11-07 RX ORDER — MORPHINE SULFATE 1 MG/ML
INJECTION, SOLUTION EPIDURAL; INTRATHECAL; INTRAVENOUS PRN
Status: DISCONTINUED | OUTPATIENT
Start: 2018-11-07 | End: 2018-11-07

## 2018-11-07 RX ORDER — ACETAMINOPHEN 325 MG/1
975 TABLET ORAL ONCE
Status: COMPLETED | OUTPATIENT
Start: 2018-11-07 | End: 2018-11-07

## 2018-11-07 RX ORDER — AMOXICILLIN 250 MG
1 CAPSULE ORAL 2 TIMES DAILY PRN
Status: DISCONTINUED | OUTPATIENT
Start: 2018-11-07 | End: 2018-11-09 | Stop reason: HOSPADM

## 2018-11-07 RX ORDER — LANOLIN 100 %
OINTMENT (GRAM) TOPICAL
Status: DISCONTINUED | OUTPATIENT
Start: 2018-11-07 | End: 2018-11-09 | Stop reason: HOSPADM

## 2018-11-07 RX ORDER — EPHEDRINE SULFATE 50 MG/ML
INJECTION, SOLUTION INTRAVENOUS PRN
Status: DISCONTINUED | OUTPATIENT
Start: 2018-11-07 | End: 2018-11-07

## 2018-11-07 RX ORDER — OXYTOCIN/0.9 % SODIUM CHLORIDE 30/500 ML
PLASTIC BAG, INJECTION (ML) INTRAVENOUS PRN
Status: DISCONTINUED | OUTPATIENT
Start: 2018-11-07 | End: 2018-11-07

## 2018-11-07 RX ORDER — NALOXONE HYDROCHLORIDE 0.4 MG/ML
.1-.4 INJECTION, SOLUTION INTRAMUSCULAR; INTRAVENOUS; SUBCUTANEOUS
Status: ACTIVE | OUTPATIENT
Start: 2018-11-07 | End: 2018-11-08

## 2018-11-07 RX ORDER — SODIUM CHLORIDE, SODIUM LACTATE, POTASSIUM CHLORIDE, CALCIUM CHLORIDE 600; 310; 30; 20 MG/100ML; MG/100ML; MG/100ML; MG/100ML
INJECTION, SOLUTION INTRAVENOUS CONTINUOUS
Status: DISCONTINUED | OUTPATIENT
Start: 2018-11-07 | End: 2018-11-07

## 2018-11-07 RX ORDER — NALOXONE HYDROCHLORIDE 0.4 MG/ML
.1-.4 INJECTION, SOLUTION INTRAMUSCULAR; INTRAVENOUS; SUBCUTANEOUS
Status: DISCONTINUED | OUTPATIENT
Start: 2018-11-07 | End: 2018-11-07

## 2018-11-07 RX ORDER — EPINEPHRINE 1 MG/ML
INJECTION, SOLUTION, CONCENTRATE INTRAVENOUS PRN
Status: DISCONTINUED | OUTPATIENT
Start: 2018-11-07 | End: 2018-11-07

## 2018-11-07 RX ORDER — BUPIVACAINE HYDROCHLORIDE 7.5 MG/ML
INJECTION, SOLUTION INTRASPINAL PRN
Status: DISCONTINUED | OUTPATIENT
Start: 2018-11-07 | End: 2018-11-07

## 2018-11-07 RX ORDER — MISOPROSTOL 200 UG/1
400 TABLET ORAL
Status: DISCONTINUED | OUTPATIENT
Start: 2018-11-07 | End: 2018-11-09 | Stop reason: HOSPADM

## 2018-11-07 RX ORDER — LIDOCAINE 40 MG/G
CREAM TOPICAL
Status: DISCONTINUED | OUTPATIENT
Start: 2018-11-07 | End: 2018-11-09 | Stop reason: HOSPADM

## 2018-11-07 RX ORDER — OXYTOCIN/0.9 % SODIUM CHLORIDE 30/500 ML
100 PLASTIC BAG, INJECTION (ML) INTRAVENOUS CONTINUOUS
Status: DISCONTINUED | OUTPATIENT
Start: 2018-11-07 | End: 2018-11-09 | Stop reason: HOSPADM

## 2018-11-07 RX ORDER — IBUPROFEN 800 MG/1
800 TABLET, FILM COATED ORAL EVERY 6 HOURS PRN
Status: DISCONTINUED | OUTPATIENT
Start: 2018-11-08 | End: 2018-11-09 | Stop reason: HOSPADM

## 2018-11-07 RX ORDER — DIPHENHYDRAMINE HCL 25 MG
25 CAPSULE ORAL EVERY 6 HOURS PRN
Status: DISCONTINUED | OUTPATIENT
Start: 2018-11-07 | End: 2018-11-09 | Stop reason: HOSPADM

## 2018-11-07 RX ORDER — MAGNESIUM HYDROXIDE 1200 MG/15ML
LIQUID ORAL PRN
Status: DISCONTINUED | OUTPATIENT
Start: 2018-11-07 | End: 2018-11-07

## 2018-11-07 RX ORDER — HYDROMORPHONE HYDROCHLORIDE 1 MG/ML
.3-.5 INJECTION, SOLUTION INTRAMUSCULAR; INTRAVENOUS; SUBCUTANEOUS EVERY 5 MIN PRN
Status: DISCONTINUED | OUTPATIENT
Start: 2018-11-07 | End: 2018-11-07 | Stop reason: CLARIF

## 2018-11-07 RX ORDER — HYDROCORTISONE 2.5 %
CREAM (GRAM) TOPICAL 3 TIMES DAILY PRN
Status: DISCONTINUED | OUTPATIENT
Start: 2018-11-07 | End: 2018-11-09 | Stop reason: HOSPADM

## 2018-11-07 RX ORDER — SIMETHICONE 80 MG
80 TABLET,CHEWABLE ORAL 4 TIMES DAILY PRN
Status: DISCONTINUED | OUTPATIENT
Start: 2018-11-07 | End: 2018-11-09 | Stop reason: HOSPADM

## 2018-11-07 RX ORDER — PRENATAL VIT/IRON FUM/FOLIC AC 27MG-0.8MG
1 TABLET ORAL DAILY
Status: DISCONTINUED | OUTPATIENT
Start: 2018-11-07 | End: 2018-11-09 | Stop reason: HOSPADM

## 2018-11-07 RX ORDER — ACETAMINOPHEN 325 MG/1
975 TABLET ORAL EVERY 8 HOURS
Status: DISCONTINUED | OUTPATIENT
Start: 2018-11-07 | End: 2018-11-09 | Stop reason: HOSPADM

## 2018-11-07 RX ORDER — HYDROMORPHONE HYDROCHLORIDE 1 MG/ML
.3-.5 INJECTION, SOLUTION INTRAMUSCULAR; INTRAVENOUS; SUBCUTANEOUS EVERY 30 MIN PRN
Status: DISCONTINUED | OUTPATIENT
Start: 2018-11-07 | End: 2018-11-09 | Stop reason: HOSPADM

## 2018-11-07 RX ORDER — OXYTOCIN/0.9 % SODIUM CHLORIDE 30/500 ML
340 PLASTIC BAG, INJECTION (ML) INTRAVENOUS CONTINUOUS PRN
Status: DISCONTINUED | OUTPATIENT
Start: 2018-11-07 | End: 2018-11-09 | Stop reason: HOSPADM

## 2018-11-07 RX ORDER — KETOROLAC TROMETHAMINE 30 MG/ML
30 INJECTION, SOLUTION INTRAMUSCULAR; INTRAVENOUS EVERY 6 HOURS
Status: COMPLETED | OUTPATIENT
Start: 2018-11-07 | End: 2018-11-08

## 2018-11-07 RX ADMIN — DEXAMETHASONE SODIUM PHOSPHATE 4 MG: 4 INJECTION, SOLUTION INTRA-ARTICULAR; INTRALESIONAL; INTRAMUSCULAR; INTRAVENOUS; SOFT TISSUE at 11:42

## 2018-11-07 RX ADMIN — MORPHINE SULFATE 0.2 MG: 1 INJECTION, SOLUTION EPIDURAL; INTRATHECAL; INTRAVENOUS at 11:36

## 2018-11-07 RX ADMIN — KETOROLAC TROMETHAMINE 30 MG: 30 INJECTION, SOLUTION INTRAMUSCULAR at 14:35

## 2018-11-07 RX ADMIN — ONDANSETRON 4 MG: 2 INJECTION INTRAMUSCULAR; INTRAVENOUS at 11:42

## 2018-11-07 RX ADMIN — PHENYLEPHRINE HYDROCHLORIDE 100 MCG: 10 INJECTION, SOLUTION INTRAMUSCULAR; INTRAVENOUS; SUBCUTANEOUS at 11:59

## 2018-11-07 RX ADMIN — ACETAMINOPHEN 975 MG: 325 TABLET, FILM COATED ORAL at 20:56

## 2018-11-07 RX ADMIN — EPHEDRINE SULFATE 10 MG: 50 INJECTION, SOLUTION INTRAVENOUS at 11:43

## 2018-11-07 RX ADMIN — SODIUM CHLORIDE, POTASSIUM CHLORIDE, SODIUM LACTATE AND CALCIUM CHLORIDE 1000 ML: 600; 310; 30; 20 INJECTION, SOLUTION INTRAVENOUS at 10:27

## 2018-11-07 RX ADMIN — SODIUM CHLORIDE, POTASSIUM CHLORIDE, SODIUM LACTATE AND CALCIUM CHLORIDE: 600; 310; 30; 20 INJECTION, SOLUTION INTRAVENOUS at 11:28

## 2018-11-07 RX ADMIN — PHENYLEPHRINE HYDROCHLORIDE 200 MCG: 10 INJECTION, SOLUTION INTRAMUSCULAR; INTRAVENOUS; SUBCUTANEOUS at 11:43

## 2018-11-07 RX ADMIN — SODIUM CITRATE AND CITRIC ACID MONOHYDRATE 30 ML: 500; 334 SOLUTION ORAL at 11:20

## 2018-11-07 RX ADMIN — SODIUM CHLORIDE, POTASSIUM CHLORIDE, SODIUM LACTATE AND CALCIUM CHLORIDE: 600; 310; 30; 20 INJECTION, SOLUTION INTRAVENOUS at 11:41

## 2018-11-07 RX ADMIN — SODIUM CHLORIDE 300 ML: 900 IRRIGANT IRRIGATION at 12:23

## 2018-11-07 RX ADMIN — OXYTOCIN-SODIUM CHLORIDE 0.9% IV SOLN 30 UNIT/500ML 500 ML: 30-0.9/5 SOLUTION at 11:54

## 2018-11-07 RX ADMIN — OXYTOCIN-SODIUM CHLORIDE 0.9% IV SOLN 30 UNIT/500ML 500 ML: 30-0.9/5 SOLUTION at 12:18

## 2018-11-07 RX ADMIN — KETOROLAC TROMETHAMINE 30 MG: 30 INJECTION, SOLUTION INTRAMUSCULAR at 20:57

## 2018-11-07 RX ADMIN — PHENYLEPHRINE HYDROCHLORIDE 100 MCG: 10 INJECTION, SOLUTION INTRAMUSCULAR; INTRAVENOUS; SUBCUTANEOUS at 11:57

## 2018-11-07 RX ADMIN — BUPIVACAINE HYDROCHLORIDE IN DEXTROSE 1.6 ML: 7.5 INJECTION, SOLUTION SUBARACHNOID at 11:36

## 2018-11-07 RX ADMIN — EPINEPHRINE 0.2 MG: 1 INJECTION, SOLUTION INTRAMUSCULAR; SUBCUTANEOUS at 11:36

## 2018-11-07 RX ADMIN — ACETAMINOPHEN 975 MG: 325 TABLET, FILM COATED ORAL at 11:22

## 2018-11-07 RX ADMIN — SODIUM CHLORIDE, SODIUM LACTATE, POTASSIUM CHLORIDE, CALCIUM CHLORIDE AND DEXTROSE MONOHYDRATE: 5; 600; 310; 30; 20 INJECTION, SOLUTION INTRAVENOUS at 14:36

## 2018-11-07 RX ADMIN — CEFAZOLIN SODIUM 2 G: 2 INJECTION, SOLUTION INTRAVENOUS at 11:20

## 2018-11-07 NOTE — ANESTHESIA PREPROCEDURE EVALUATION
Anesthesia Evaluation     . Pt has had prior anesthetic. Type: Regional           ROS/MED HX    ENT/Pulmonary:  - neg pulmonary ROS     Neurologic:  - neg neurologic ROS     Cardiovascular:  - neg cardiovascular ROS       METS/Exercise Tolerance:     Hematologic:     (+) Anemia (pregnancy-induced), -      Musculoskeletal:  - neg musculoskeletal ROS       GI/Hepatic:  - neg GI/hepatic ROS       Renal/Genitourinary:     (+) Other Renal/ Genitourinary,       Endo:     (+) thyroid problem .   Type I DM: dysmenorrhea.   Psychiatric:  - neg psychiatric ROS       Infectious Disease: Comment: GBS (+)        Malignancy:         Other:                     Physical Exam  Normal systems: cardiovascular, pulmonary and dental    Airway   Mallampati: II  TM distance: >3 FB  Neck ROM: full    Dental     Cardiovascular       Pulmonary                     Anesthesia Plan      History & Physical Review      ASA Status:  2 .    NPO Status:  > 8 hours    Plan for Spinal   PONV prophylaxis:  Ondansetron (or other 5HT-3)       Postoperative Care  Postoperative pain management:  IV analgesics, Oral pain medications and Multi-modal analgesia.      Consents  Anesthetic plan, risks, benefits and alternatives discussed with:  Patient and Spouse..                          .

## 2018-11-07 NOTE — ANESTHESIA PROCEDURE NOTES
Peripheral nerve/Neuraxial procedure note : intrathecal  Pre-Procedure  Performed by  TEJINDER SCHMITZ   Location: OR    Procedure Times:11/7/2018 11:34 AM and 11/7/2018 11:36 AM  Pre-Anesthestic Checklist: patient identified, IV checked, site marked, risks and benefits discussed, informed consent, monitors and equipment checked, pre-op evaluation, at physician/surgeon's request and post-op pain management    Timeout  Correct Patient: Yes   Correct Procedure: Yes   Correct Site: Yes   Correct Laterality: Yes   Correct Position: Yes   Site Marked: Yes   .   Procedure Documentation  ASA 2  .    Procedure:    Intrathecal.  Insertion Site:L4-5  (midline approach)      Patient Prep;mask, sterile gloves, povidone-iodine 7.5% surgical scrub, patient draped.  .  Needle: Gus tip Spinal Needle (gauge): 27  Spinal/LP Needle Length (inches): 3.5 # of attempts: 1 and # of redirects:  Introducer used .       Assessment/Narrative  Paresthesias: No.  .  .  clear CSF fluid removed . Time Injected: 11:36

## 2018-11-07 NOTE — PLAN OF CARE
S:Delivery  B:No Labor,40w1d    Lab Results   Component Value Date    GBS Positive (A) 10/09/2018    with antibiotic treatment not indicated 4 hours prior to delivery.  A: Patient delivered Repeat C/S at 1153 with Dr. SENAIT Garcia in attendance and baby placed on mother's abdomen for immediate cord clamping. Baby received from surgeon. Baby to  warmer for drying and wrapped in blanket.. Placed skin to skin @ 1208.. Apgars 9/9.  IV infusion of Oxytocin  infused. Placenta removal manual. See Flowsheet for VS and PP checks. Labor care plan goals met, transition now to postpartum care.  R: Expect routine postpartum care. Anticipate first feeding within the hour or whenever infant displays feeding cues. Continue skin to skin. Prior discussion with mother indicates that feeding plan is Breast feeding . Educated mother on importance of exclusive breastfeeding, expected feeding readiness cues and encouraged her to observe for these cues while rooming in. Informed her that breastfeeding assistance would be provided.

## 2018-11-07 NOTE — IP AVS SNAPSHOT
[facility]    [address] [address]    Phone:  [phone]    Fax:  [phone]                                       After Visit Summary   11/7/2018    María Briones    MRN: [id number]           After Visit Summary Signature Page     I have received my discharge instructions, and my questions have been answered. I have discussed any challenges I see with this plan with the nurse or doctor.    ..........................................................................................................................................  Patient/Patient Representative Signature      ..........................................................................................................................................  Patient Representative Print Name and Relationship to Patient    ..................................................               ................................................  Date                                   Time    ..........................................................................................................................................  Reviewed by Signature/Title    ...................................................              ..............................................  Date                                               Time          22EPIC Rev 08/18

## 2018-11-07 NOTE — ANESTHESIA CARE TRANSFER NOTE
Patient: María Briones    Procedure(s):   Section    Diagnosis: prior   Diagnosis Additional Information: No value filed.    Anesthesia Type:   No value filed.     Note:    Patient transferred to:Labor and Delivery  Handoff Report: Identifed the Patient, Identified the Reponsible Provider, Reviewed the pertinent medical history, Discussed the surgical course, Reviewed Intra-OP anesthesia mangement and issues during anesthesia, Set expectations for post-procedure period and Allowed opportunity for questions and acknowledgement of understanding      Vitals: (Last set prior to Anesthesia Care Transfer)    CRNA VITALS  2018 1157 - 2018 1242      2018             Pulse: 81    SpO2: 98 %                Electronically Signed By: RANDELL Echevarria CRNA  2018  12:42 PM

## 2018-11-07 NOTE — BRIEF OP NOTE
Brief Op Note    Preop Dx: prior   Postop DX: prior   Procedure: repeat low transverse  section   Surgeon: Jayshree Garcia M.D.   Assist: n/a  Anesthesia: spinal  FRA Score: 2  EBL: 198 ml   IVF: see anesthesia records  UOP: see anesthesia records  Complications: none  Findings: Viable female, vertex, 8#3, APGARS 9/9, clear fluid, normal uterus/tubes/ovaries, placenta 3vc  Disposition: stable

## 2018-11-07 NOTE — L&D DELIVERY NOTE
"39 yo  @ 40w1d presented for repeat    ml  No complications  Viable female, vertex, 8#3, APGARS 9/9, clear fluid, normal uterus/tubes/ovaries, placenta 3vc  Mother and infant stable.    Dante Infante M.D.   Delivery Summary    María Briones MRN# [id number]   Age: 38 year old YOB: 1980           Delivery/Placenta Date and Time    Delivery Date:  18 Delivery Time:  11:53 AM      Apgars    Living status:  Living    1 Minute 5 Minute 10 Minute 15 Minute 20 Minute   Skin color: 1  1       Heart rate: 2  2       Reflex irritability: 2  2       Muscle tone: 2  2       Respiratory effort: 2  2       Total: 9  9          Apgars assigned by:  VIVIAN NAZARIO      Cord    Vessels:  3 Vessels Complications:  None   Cord Blood Disposition:  Lab Gases Sent?:  No          Resuscitation    Methods:  None      Output in Delivery Room:  Stool      [place] Measurements    Weight:  8 lb 3 oz Length:  1' 8.25\"   Head circumference:  35.6 cm       Skin to Skin and Feeding Plan    Skin to skin initiation date/time: 18 1216   Skin to skin with:  Mother   Skin to skin end date/time:     How do you plan to feed your baby:  Breastfeeding      Labor Events and Shoulder Dystocia    Fetal Tracing Prior to Delivery:  Category 1   Shoulder dystocia present?:  Neg            Delivery (Maternal) (Provider to Complete) (144653)    Episiotomy:  None         Mother's Information  Mother: María Briones #[id number]    Start of Mother's Information     IO Blood Loss  18 1144 - 18 1328    Mom's I/O Activity            End of Mother's Information  Mother: María Briones #[id number]            Delivery - Provider to Complete (825142)    Delivering clinician:  DANTE INFANTE   Attempted Delivery Types (Choose all that apply):  Other   Delivery Type (Choose the 1 that will go to the Birth History):  , Low Transverse                      Specifics:  Repeat   Indications for " Repeat:  39+ weeks /Planned repeat   Other personnel:   Provider Role   FLORIDA GARDUNO Delivery Nurse   HIMA LOJA Certified Registered Nurse Anesthesiologist   LEIF GODINEZ TAMMY L Tech MOEN, CHELSEY Scrub Nurse            Placenta    Immediate Cord Clamping:  Done   Removal:  Expressed   Disposition:  Hospital disposal      Anesthesia    Method:  Spinal         Presentation and Position    Presentation:  Vertex                    Jayshree Garcia MD

## 2018-11-07 NOTE — PLAN OF CARE
S:Patient presents for scheduled csection.  Accompanied by spouse, David.  B:40w1d   Allergies: Review of patient's allergies indicates no known allergies.  A:         FHTs are category 1.        No visits with results within 1 Day(s) from this visit.  Latest known visit with results is:    Prenatal Office Visit on 10/09/2018   Component Date Value     Group B Strep PCR Spec D* 10/09/2018 Vaginal Rectal      Group B Strep PCR 10/09/2018 Positive*     Specimen Description 10/09/2018 Vaginal Rectal      Culture Micro 10/09/2018 *                    Value:Streptococcus agalactiae sero group B  isolated       Dr. SENAIT Garcia aware and orders released.    Oriented to room and call light.

## 2018-11-07 NOTE — ANESTHESIA POSTPROCEDURE EVALUATION
Patient: María Briones    Procedure(s):   Section    Diagnosis:prior   Diagnosis Additional Information: No value filed.    Anesthesia Type:  No value filed.    Note:  Anesthesia Post Evaluation    Patient location during evaluation: OB PACU  Patient participation: Able to fully participate in evaluation  Level of consciousness: awake and alert  Pain management: adequate  Airway patency: patent  Cardiovascular status: acceptable  Respiratory status: acceptable  Hydration status: acceptable  PONV: none     Anesthetic complications: None          Last vitals:  Vitals:    18 0946   BP: 133/67   Pulse: 88   Resp: 16   Temp: 36.8  C (98.2  F)         Electronically Signed By: RANDELL Echevarria CRNA  2018  12:43 PM

## 2018-11-07 NOTE — PLAN OF CARE
[facility] for scheduled [facility]/S. VS stable. Positive fetal movement. Category I strip on EFM. PIV placed. Procedure explained to patient. Questions answered. Consents signed. Patient prepped for surgery.

## 2018-11-07 NOTE — IP AVS SNAPSHOT
MRN:[id number]                      After Visit Summary   2018    María Briones    MRN: [id number]           Thank you!     Thank you for choosing [facility] for your care. Our goal is always to provide you with excellent care. Hearing back from our patients is one way we can continue to improve our services. Please take a few minutes to complete the written survey that you may receive in the mail after you visit with us. Thank you!        Patient Information     Date Of Birth          1980        About your hospital stay     You were admitted on:  2018 You last received care in the:  [facility]    You were discharged on:  2018       Who to Call     For medical emergencies, please call 911.  For non-urgent questions about your medical care, please call your primary care provider or clinic, None  For questions related to your surgery, please call your surgery clinic        Attending Provider     Provider Jayshree Marie MD OB/Gyn       Primary Care Provider Fax #    Physician No Ref-Primary [phone]      Further instructions from your care team            Discharge Instructions and Follow-Up:   Discharge diet: Regular   Discharge activity: No heavy lifting for 6 week(s)  No sex for 6 week(s)   Discharge follow-up: Follow up with OB clinic in 6 weeks   Wound care: Drink plenty of fluids  Ice to area for comfort  Keep wound clean and dry      Postop  Birth Instructions    Activity       Do not lift more than 10 pounds for 6 weeks after surgery.  Ask family and friends for help when you need it.    No driving until you have stopped taking your pain medications (usually two weeks after surgery).    No heavy exercise or activity for 6 weeks.  Don't do anything that will put a strain on your surgery site.    Don't strain when using the toilet.  Your care team may prescribe a stool softener if you have problems with your bowel  movements.     To care for your incision:       Keep the incision clean and dry.    Do not soak your incision in water. No swimming or hot tubs until it has fully healed. You may soak in the bathtub if the water level is below your incision.    Do not use peroxide, gel, cream, lotion, or ointment on your incision.    Adjust your clothes to avoid pressure on your surgery site (check the elastic in your underwear for example).     You may see a small amount of clear or pink drainage and this is normal.  Check with your health care provider:       If the drainage increases or has an odor.    If the incision reddens, you have swelling, or develop a rash.    If you have increased pain and the medicine we prescribed doesn't help.    If you have a fever above 100.4 F (38 C) with or without chills when placing thermometer under your tongue.   The area around your incision (surgery wound), will feel numb.  This is normal. The numbness should go away in less than a year.     Keep your hands clean:  Always wash your hands before touching your incision (surgery wound). This helps reduce your risk of infection. If your hands aren't dirty, you may use an alcohol hand-rub to clean your hands. Keep your nails clean and short.    Call your healthcare provider if you have any of these symptoms:       You soak a sanitary pad with blood within 1 hour, or you see blood clots larger than a golf ball.    Bleeding that lasts more than 6 weeks.    Vaginal discharge that smells bad.    Severe pain, cramping or tenderness in your lower belly area.    A need to urinate more frequently (use the toilet more often), more urgently (use the toilet very quickly), or it burns when you urinate.    Nausea and vomiting.    Redness, swelling or pain around a vein in your leg.    Problems breastfeeding or a red or painful area on your breast.    Chest pain and cough or are gasping for air.    Problems with coping with sadness, anxiety or depression. If  you have concerns about hurting yourself or the baby, call your provider immediately.      You have questions or concerns after you return home.                  Pending Results     No orders found from 2018 to 2018.            Admission Information     Date & Time Provider Department Dept. Phone    2018 Jayshree Garcia MD [facility] [phone]      Your Vitals Were     Blood Pressure Pulse Temperature Respirations Last Period Pulse Oximetry    120/51 71 97.9  F (36.6  C) (Oral) 16 2018 (Approximate) 96%      MyChart Information     [employer] gives you secure access to your electronic health record. If you see a primary care provider, you can also send messages to your care team and make appointments. If you have questions, please call your primary care clinic.  If you do not have a primary care provider, please call [phone] and they will assist you.        Care EveryWhere ID     This is your Care EveryWhere ID. This could be used by other organizations to access your [place] medical records  [id number]        Equal Access to Services     SHERLYN QURESHI : Hadii mando walsh Soosbaldo, waaxda luqadaha, qaybta kaalmazoe weaver, juan nazario . So [facility] [phone].    ATENCIÓN: Si habla español, tiene a laird disposición servicios gratuitos de asistencia lingüística. Llame al [phone].    We comply with applicable federal civil rights laws and [place] laws. We do not discriminate on the basis of race, color, national origin, age, disability, sex, sexual orientation, or gender identity.               Review of your medicines      START taking        Dose / Directions    oxyCODONE IR 5 MG tablet   Commonly known as:  ROXICODONE   Used for:  H/O:         Dose:  5-10 mg   Take 1-2 tablets (5-10 mg) by mouth every 3 hours as needed for breakthrough pain   Quantity:  5 tablet   Refills:  0         CONTINUE these medicines which have NOT  CHANGED        Dose / Directions    IRON SUPPLEMENT PO        Refills:  0       prenatal multivitamin plus iron 27-0.8 MG Tabs per tablet        Dose:  1 tablet   Take 1 tablet by mouth daily   Refills:  0            Where to get your medicines      Some of these will need a paper prescription and others can be bought over the counter. Ask your nurse if you have questions.     Bring a paper prescription for each of these medications     oxyCODONE IR 5 MG tablet                Protect others around you: Learn how to safely use, store and throw away your medicines at www.disposemymeds.org.        Information about OPIOIDS     PRESCRIPTION OPIOIDS: WHAT YOU NEED TO KNOW   We gave you an opioid (narcotic) pain medicine. It is important to manage your pain, but opioids are not always the best choice. You should first try all the other options your care team gave you. Take this medicine for as short a time (and as few doses) as possible.    Some activities can increase your pain, such as bandage changes or therapy sessions. It may help to take your pain medicine 30 to 60 minutes before these activities. Reduce your stress by getting enough sleep, working on hobbies you enjoy and practicing relaxation or meditation. Talk to your care team about ways to manage your pain beyond prescription opioids.    These medicines have risks:    DO NOT drive when on new or higher doses of pain medicine. These medicines can affect your alertness and reaction times, and you could be arrested for driving under the influence (DUI). If you need to use opioids long-term, talk to your care team about driving.    DO NOT operate heavy machinery    DO NOT do any other dangerous activities while taking these medicines.    DO NOT drink any alcohol while taking these medicines.     If the opioid prescribed includes acetaminophen, DO NOT take with any other medicines that contain acetaminophen. Read all labels carefully. Look for the word   acetaminophen  or  Tylenol.  Ask your pharmacist if you have questions or are unsure.    You can get addicted to pain medicines, especially if you have a history of addiction (chemical, alcohol or substance dependence). Talk to your care team about ways to reduce this risk.    All opioids tend to cause constipation. Drink plenty of water and eat foods that have a lot of fiber, such as fruits, vegetables, prune juice, apple juice and high-fiber cereal. Take a laxative (Miralax, milk of magnesia, Colace, Senna) if you don t move your bowels at least every other day. Other side effects include upset stomach, sleepiness, dizziness, throwing up, tolerance (needing more of the medicine to have the same effect), physical dependence and slowed breathing.    Store your pills in a secure place, locked if possible. We will not replace any lost or stolen medicine. If you don t finish your medicine, please throw away (dispose) as directed by your pharmacist. The [place] Pollution Control Agency has more information about safe disposal: https://www.pca.[facility].[place].us/living-green/managing-unwanted-medications             Medication List: This is a list of all your medications and when to take them. Check marks below indicate your daily home schedule. Keep this list as a reference.      Medications           Morning Afternoon Evening Bedtime As Needed    IRON SUPPLEMENT PO                                oxyCODONE IR 5 MG tablet   Commonly known as:  ROXICODONE   Take 1-2 tablets (5-10 mg) by mouth every 3 hours as needed for breakthrough pain                                prenatal multivitamin plus iron 27-0.8 MG Tabs per tablet   Take 1 tablet by mouth daily   Last time this was given:  1 tablet on 11/8/2018 10:16 PM

## 2018-11-07 NOTE — H&P (VIEW-ONLY)
CC: Here for routine prenatal visit @ 39w2d   HPI: + FM, no ctx, no LOF, no VB.  No complaints.     PE: LMP 2018 (Approximate)   See OB flowsheet    A/P  @ 39w2d normal pregnancy    1. Routine prenatal care  2.  planned for 18      PRE-OP EVALUATION:  Today's date: 2018    María Briones (: 1980) presents for pre-operative evaluation. She requires evaluation and anesthesia risk assessment prior to undergoing surgery/procedure for treatment of prior  .    Proposed Surgery/ Procedure: repeat   Date of Surgery/ Procedure: 18  Time of Surgery/ Procedure: [facility]  Hospital/Surgical Facility: [facility]  Primary Physician: No Ref-Primary, Physician  Type of Anesthesia Anticipated: Spinal    Patient has a Health Care Directive or Living Will:  NO    1. NO - Do you have a history of heart attack, stroke, stent, bypass or surgery on an artery in the head, neck, heart or legs?  2. NO - Do you ever have any pain or discomfort in your chest?  3. NO - Do you have a history of  Heart Failure?  4. NO - Are you troubled by shortness of breath when: walking on the level, up a slight hill or at night?  5. NO - Do you currently have a cold, bronchitis or other respiratory infection?  6. NO - Do you have a cough, shortness of breath or wheezing?  7. NO - Do you sometimes get pains in the calves of your legs when you walk?  9. YES - DO YOU OR DOES ANYONE IN YOUR FAMILY HAVE A SERIOUS BLEEDING PROBLEM SUCH AS PROLONGED BLEEDING FOLLOWING SURGERIES OR CUTS? Father--related to surgeries  9. NO - Do you or does anyone in your family have a serious bleeding problem such as prolonged bleeding following surgeries or cuts?  11. Yes- anemia in pregnancy  11. NO - Have you had any abnormal blood loss such as black, tarry or bloody stools, or abnormal vaginal bleeding?  12. NO - Have you ever had a blood transfusion?  13. NO - Have you or any of your relatives ever had problems with  anesthesia?  14. YES - DO YOU HAVE SLEEP APNEA, EXCESSIVE SNORING OR DAYTIME DROWSINESS? Snores but not excessively  15. NO - Do you have any prosthetic heart valves?  16. NO - Do you have prosthetic joints?  17. NO - Is there any chance that you may be pregnant?      HPI:     HPI related to upcoming procedure: 39 yo  @ 39w2d planning on repeat  on 18      See problem list for active medical problems.  Problems all longstanding and stable, except as noted/documented.  See ROS for pertinent symptoms related to these conditions.                                                                                                                                                          .    MEDICAL HISTORY:     Patient Active Problem List    Diagnosis Date Noted     GBS (group B Streptococcus carrier), +RV culture, currently pregnant 10/11/2018     Priority: High     Supervision of other normal pregnancy, antepartum 2018     Priority: High     Spontaneous conception after 3 failed embryo transfers in        Need for Tdap vaccination 2018     Priority: Medium     TDAP GIVEN       Other specified hypothyroidism 2018     Priority: Medium     H/O:  2018     Priority: Medium     Antepartum multigravida of advanced maternal age 2018     Priority: Medium     Female infertility 2014     Priority: Medium     History of Stage III endometriosis by laparoscopy on 4/15/14    Prior Successful IVF       Dysmenorrhea 2014     Priority: Medium     Irregular periods 2013     Priority: Medium      Past Medical History:   Diagnosis Date     Chickenpox      Palpitations 2008     Urinary tract bacterial infections     as chold     Past Surgical History:   Procedure Laterality Date     C ORAL SURGERY PROCEDURE      wisdom teeth      SECTION N/A 10/12/2015    Procedure:  SECTION;  Surgeon: Jayshree Garcia MD;  Location: [facility]     LAPAROSCOPIC  "ABLATION ENDOMETRIOSIS  4/15/2014    Procedure: LAPAROSCOPIC ABLATION ENDOMETRIOSIS;  Surgeon: Mohamud Osorio MD;  Location: [facility]     LAPAROSCOPIC TUBAL DYE STUDY  4/15/2014    Procedure: LAPAROSCOPIC TUBAL DYE STUDY;  Surgeon: Mohamud Osorio MD;  Location: [facility]     LAPAROSCOPY DIAGNOSTIC (GYN)  4/15/2014    Procedure: Operative Laparoscopy, Excision Cauterization Of Endometriosis, Chromopertubation ;  Surgeon: Mohamud Osorio MD;  Location: [facility] OR     Current Outpatient Prescriptions   Medication Sig Dispense Refill     Ferrous Sulfate (IRON SUPPLEMENT PO)        Prenatal Vit-Fe Fumarate-FA (PRENATAL MULTIVITAMIN  PLUS IRON) 27-0.8 MG TABS Take 1 tablet by mouth daily       OTC products: None, except as noted above    No Known Allergies   Latex Allergy: NO    Social History   Substance Use Topics     Smoking status: Never Smoker     Smokeless tobacco: Never Used     Alcohol use Yes      Comment: occas- quit with pregnancy     History   Drug Use No       REVIEW OF SYSTEMS:   CONSTITUTIONAL: NEGATIVE for fever, chills, change in weight  ENT/MOUTH: NEGATIVE for ear, mouth and throat problems  RESP: NEGATIVE for significant cough or SOB  CV: NEGATIVE for chest pain, palpitations or peripheral edema    EXAM:   /62 (BP Location: Right arm, Patient Position: Chair, Cuff Size: Adult Regular)  Pulse 86  Temp 98.7  F (37.1  C) (Tympanic)  Resp 16  Ht 5' 3\" (1.6 m)  Wt 135 lb 6.4 oz (61.4 kg)  LMP 01/30/2018 (Approximate)  Breastfeeding? No  BMI 23.99 kg/m2  GENERAL APPEARANCE: healthy, alert and no distress  RESP: lungs clear to auscultation - no rales, rhonchi or wheezes  CV: regular rate and rhythm, normal S1 S2, no S3 or S4 and no murmur, click or rub   ABDOMEN: soft, nontender, no HSM or masses and bowel sounds normal  NEURO: Normal strength and tone, sensory exam grossly normal, mentation intact and speech normal    DIAGNOSTICS:   Hemoglobin (indicated for history of anemia or procedure with " significant blood loss such as tonsillectomy, major intraperitoneal surgery, vascular surgery, major spine surgery, total joint replacement)    Recent Labs   Lab Test  18   0816  18   1442  18   0752   HGB  10.3*  13.0  13.6   PLT   --   285  405   A1C   --    --   5.2        IMPRESSION:   Reason for surgery/procedure: prior     The proposed surgical procedure is considered LOW risk.    REVISED CARDIAC RISK INDEX  The patient has the following serious cardiovascular risks for perioperative complications such as (MI, PE, VFib and 3  AV Block):  No serious cardiac risks  INTERPRETATION: 1 risks: Class II (low risk - 0.9% complication rate)    The patient has the following additional risks for perioperative complications:  No identified additional risks      ICD-10-CM    1. Supervision of other normal pregnancy, antepartum Z34.80        RECOMMENDATIONS:       --Patient is to take all scheduled medications on the day of surgery EXCEPT for modifications listed below.    APPROVAL GIVEN to proceed with proposed procedure, without further diagnostic evaluation       Signed Electronically by: Jayshree Garcia MD    Copy of this evaluation report is provided to requesting physician.    [place] Preop Guidelines    Revised Cardiac Risk Index

## 2018-11-07 NOTE — OP NOTE
Op Note    Preop Dx: 1. 40w1d intrauterine pregnancy , 2. Prior  section    Postop DX: 1. 40w1d intrauterine pregnancy , 2. Prior  section    Procedure: Repeat low transverse  section     Surgeon: Jayshree Garcia M.D.     Anesthesia: spinal    FRA Score: 2    EBL: 198 cc    IVF: see anesthesia records    UOP: see anesthesia records    Complications: none    Findings: viable infant female in vertex presentation weighing 8 lb 3oz with Apgars of  9 at 1 minute and 9 at 5 minutes.  Clear amniotic fluid.  Placenta with 3 vessel cord.  Normal uterus, tubes and ovaries.    Indications: María Briones is a 38 year old  2 Para 1001 who [facility] at 40 and 1/7 weeks for repeat  section due to prior .  Patient understood risks and benefits including risks of bleeding, infection and damage to surrounding structures.  Patient agreed to proceed.     Procedure: Patient was brought to the operating room and spinal anesthesia was found to be adequate.  She was prepped and draped in the usual sterile fashion in the dorsal supine position with a leftward tilt.  A pfannensteil incision was created in the usual fashion.  An Otis-O retractor was placed in the incision and the lower uterine segment well visualized.  The lower uterine segment was incised sharply and transversely with the scalpel.  Clear amniotic fluid was noted.  The infant vertex was brought out of the uterine incision without difficulty.  The rest of the infant was delivered without difficulty.  The cord was clamped and cut.  The infant was handed off to the waiting nurse.  Cord blood was obtained.  The placenta was expressed intact with a 3 vessel cord.  The uterus cleared of all clot and debris.  The uterine incision was repaired with 0-Vicryl in a running fashion followed by a second layer of 0-Monocryl in a imbricated fashion.  Excellent hemostasis was noted.  The gutters were cleared of all clot  and debris.  Reinspection of the incision revealed excellent hemostasis.  The Otis-O was removed.  The peritoneum was repaired with 2-0 vicryl in a running fashion.   The fascia was repaired with 0-Vicryl in a running fashion.   The subcutaneous tissue was repaired with 3-0 Plain in a running fashion.   The skin was closed with 4-0 V-Domonique-90 in a subcuticular fashion.   The incision was dressed with Secure Seal skin adhesive.      Patient tolerated procedure well.  Sponge, lap and needle counts are correct.  I was present for the entire procedure.  Patient was taken to her recovery room in stable condition.  Lachelle-operative antibiotics were given.

## 2018-11-08 LAB
HGB BLD-MCNC: 9 G/DL (ref 11.7–15.7)
T PALLIDUM AB SER QL: NONREACTIVE

## 2018-11-08 PROCEDURE — 12000031 ZZH R&B OB CRITICAL

## 2018-11-08 PROCEDURE — 36415 COLL VENOUS BLD VENIPUNCTURE: CPT | Performed by: OBSTETRICS & GYNECOLOGY

## 2018-11-08 PROCEDURE — 85018 HEMOGLOBIN: CPT | Performed by: OBSTETRICS & GYNECOLOGY

## 2018-11-08 PROCEDURE — 25000132 ZZH RX MED GY IP 250 OP 250 PS 637: Performed by: OBSTETRICS & GYNECOLOGY

## 2018-11-08 PROCEDURE — 25000128 H RX IP 250 OP 636: Performed by: OBSTETRICS & GYNECOLOGY

## 2018-11-08 RX ADMIN — KETOROLAC TROMETHAMINE 30 MG: 30 INJECTION, SOLUTION INTRAMUSCULAR at 09:59

## 2018-11-08 RX ADMIN — PRENATAL VIT W/ FE FUMARATE-FA TAB 27-0.8 MG 1 TABLET: 27-0.8 TAB at 22:16

## 2018-11-08 RX ADMIN — IBUPROFEN 800 MG: 800 TABLET ORAL at 22:16

## 2018-11-08 RX ADMIN — IBUPROFEN 800 MG: 800 TABLET ORAL at 16:23

## 2018-11-08 RX ADMIN — SIMETHICONE CHEW TAB 80 MG 80 MG: 80 TABLET ORAL at 16:38

## 2018-11-08 RX ADMIN — ACETAMINOPHEN 975 MG: 325 TABLET, FILM COATED ORAL at 15:01

## 2018-11-08 RX ADMIN — KETOROLAC TROMETHAMINE 30 MG: 30 INJECTION, SOLUTION INTRAMUSCULAR at 03:40

## 2018-11-08 RX ADMIN — ACETAMINOPHEN 975 MG: 325 TABLET, FILM COATED ORAL at 06:28

## 2018-11-08 NOTE — PROGRESS NOTES
PPD # 1    S: pain controlled.  Ambulating, voiding and passing flatus  O: /52  Pulse 71  Temp 97  F (36.1  C) (Oral)  Resp 18  LMP 2018 (Approximate)  SpO2 96%  Breastfeeding? Unknown   UOP: 1000 ml/shift  NAD  Abd: soft, nontender, fundus firm  Inc: clean and intact  Ext: calves nontender      Component      Latest Ref Rng & Units 2018   Hemoglobin      11.7 - 15.7 g/dL 11.0 (L) 9.0 (L)       A/P PPD # 1 s/p low transverse  section     Routine PP care.  Anemia--appropriate for EBL and hemodilution.  Encourage high iron diet.      Jayshree Garcia M.D.

## 2018-11-08 NOTE — PLAN OF CARE
Problem: Postpartum ( Delivery) (Adult,Obstetrics,Pediatric)  Goal: Signs and Symptoms of Listed Potential Problems Will be Absent, Minimized or Managed (Postpartum)  Signs and symptoms of listed potential problems will be absent, minimized or managed by discharge/transition of care (reference Postpartum ( Delivery) (Adult,Obstetrics,Pediatric) CPG).   Outcome: Improving  Data: Vital signs within normal limits. Postpartum checks within normal limits - see flow record. Patient eating and drinking normally. Patient has not voided since delivery and will call for assistance.  Patient instructed to call for assist when up. No apparent signs of infection. Incision healing well. Patient is performing self cares and is able to care for infant. Breastfeeding independently. Positive attachment behaviors are observed with infant. Support persons are present.  Action:  Pain plan was discussed. Patient would like pain meds to be brought in when they are due. Patient was medicated during the shift for pain. See MAR. Patient education done about breastfeeding,  cares, postpartum cares, pain management/plan, fall risk and rest. See flow record.  Response: Patient reassessed within 1 hour after each medication for pain. Patient stated that pain had improved. Patient stated that she was comfortable.  Plan: Anticipate discharge on 11/10/18.

## 2018-11-09 VITALS
OXYGEN SATURATION: 96 % | SYSTOLIC BLOOD PRESSURE: 120 MMHG | HEART RATE: 71 BPM | TEMPERATURE: 97.9 F | DIASTOLIC BLOOD PRESSURE: 51 MMHG | RESPIRATION RATE: 16 BRPM

## 2018-11-09 PROCEDURE — 25000132 ZZH RX MED GY IP 250 OP 250 PS 637: Performed by: OBSTETRICS & GYNECOLOGY

## 2018-11-09 RX ORDER — OXYCODONE HYDROCHLORIDE 5 MG/1
5-10 TABLET ORAL
Qty: 5 TABLET | Refills: 0 | Status: SHIPPED | OUTPATIENT
Start: 2018-11-09 | End: 2018-12-21

## 2018-11-09 RX ADMIN — IBUPROFEN 800 MG: 800 TABLET ORAL at 03:45

## 2018-11-09 RX ADMIN — ACETAMINOPHEN 975 MG: 325 TABLET, FILM COATED ORAL at 08:41

## 2018-11-09 RX ADMIN — ACETAMINOPHEN 975 MG: 325 TABLET, FILM COATED ORAL at 00:07

## 2018-11-09 RX ADMIN — SENNOSIDES AND DOCUSATE SODIUM 1 TABLET: 8.6; 5 TABLET ORAL at 08:42

## 2018-11-09 NOTE — PROGRESS NOTES
Patient discharged today at 0945 per ambulatory with infant in car seat. Mother verified that her band matches her infant's band by comparing the infant's  MR#.  Discharge instructions given. Encouraged to call for any problems, questions or concerns. RXs to be picked up at discharge.

## 2018-11-09 NOTE — DISCHARGE SUMMARY
[facility] Discharge Summary    María Briones MRN# [id number]   Age: 38 year old YOB: 1980     Date of Admission:  2018  Date of Discharge::  2018  Admitting Physician:  Jayshree Garcia MD  Discharge Physician:  Jayshree Garcia MD     Home clinic: [facility]          Admission Diagnoses:   Intrauterine pregnancy at 40w1d weeks gestation  Repeat  section          Discharge Diagnosis:   Intrauterine pregnancy at 40w1d  weeks gestation  Repeat  section          Procedures:   Procedure(s): Repeat low transverse  section       No other procedures performed during this admission           Medications Prior to Admission:     Prescriptions Prior to Admission   Medication Sig Dispense Refill Last Dose     Ferrous Sulfate (IRON SUPPLEMENT PO)    2018 at 0800     Prenatal Vit-Fe Fumarate-FA (PRENATAL MULTIVITAMIN  PLUS IRON) 27-0.8 MG TABS Take 1 tablet by mouth daily   2018 at 2100             Discharge Medications:     Current Discharge Medication List      START taking these medications    Details   oxyCODONE IR (ROXICODONE) 5 MG tablet Take 1-2 tablets (5-10 mg) by mouth every 3 hours as needed for breakthrough pain  Qty: 5 tablet, Refills: 0    Associated Diagnoses: H/O:          CONTINUE these medications which have NOT CHANGED    Details   Ferrous Sulfate (IRON SUPPLEMENT PO)       Prenatal Vit-Fe Fumarate-FA (PRENATAL MULTIVITAMIN  PLUS IRON) 27-0.8 MG TABS Take 1 tablet by mouth daily                   Consultations:   No consultations were requested during this admission          Brief History of Labor or Admission:   39 yo  @ 40w1d presented for repeat    ml  No complications  Viable female, vertex, 8#3, APGARS 9/9, clear fluid, normal uterus/tubes/ovaries, placenta 3vc  Mother and infant stable           Hospital Course:   The patient's hospital course was unremarkable.  She recovered as anticipated and  experienced no post-operative complications.  On discharge, her pain was well controlled. Vaginal bleeding is similar to peak menstrual flow.  Voiding without difficulty.  Ambulating well and tolerating a normal diet.  No fever or significant wound drainage.  Breastfeeding well.  Infant is stable.  She was discharged on post-partum day #2.    Post-partum hemoglobin:   Hemoglobin   Date Value Ref Range Status   11/08/2018 9.0 (L) 11.7 - 15.7 g/dL Final   PE: /51  Pulse 71  Temp 97.9  F (36.6  C) (Oral)  Resp 16  LMP 01/30/2018 (Approximate)  SpO2 96%  Breastfeeding? Unknown  NAD  Abd: soft, appropriately tender, fundus firm  Inc: clean and intact  Ext: calves nontender          Discharge Instructions and Follow-Up:   Discharge diet: Regular   Discharge activity: No heavy lifting for 6 week(s)  No sex for 6 week(s)   Discharge follow-up: Follow up with OB clinic in 6 weeks   Wound care: Drink plenty of fluids  Ice to area for comfort  Keep wound clean and dry           Discharge Disposition:   Discharged to home      Attestation:  I have reviewed today's vital signs, notes, medications, labs and imaging.    aJyshree Garcia MD

## 2018-11-09 NOTE — PLAN OF CARE
Problem: Postpartum ( Delivery) (Adult,Obstetrics,Pediatric)  Goal: Signs and Symptoms of Listed Potential Problems Will be Absent, Minimized or Managed (Postpartum)  Signs and symptoms of listed potential problems will be absent, minimized or managed by discharge/transition of care (reference Postpartum ( Delivery) (Adult,Obstetrics,Pediatric) CPG).   Outcome: Improving  Data: Vital signs within normal limits. Postpartum checks within normal limits - see flow record. Patient eating and drinking normally. Patient able to empty bladder independently. Patient ambulating independently. No apparent signs of infection. Incision healing well. Patient is performing self cares and is able to care for infant. Breastfeeding independently. Positive attachment behaviors are observed with infant. Support persons are present.  Action:  Pain plan was discussed. Patient will request pain med when she is ready for it. Patient was medicated during the shift for pain. See MAR. Patient education done about breastfeeding,  cares, pain management/plan and rest. See flow record.  Response:   Patient reassessed within 1 hour after each medication for pain. Patient stated that pain had improved. Patient stated that she was comfortable.  Plan: Anticipate discharge on 18.

## 2018-11-09 NOTE — PLAN OF CARE
Patient showered, up in halls to ambulate.  Using oral meds for pain, alternating ibuprofen and acetaminophen.  Pain controlled well.  VSS.  Incision clean, dry intact.  Postpartum depression assessment tool explained and given to patient to complete.  Plans to discharge tomorrow.  Routine postpartum cares.

## 2018-11-09 NOTE — DISCHARGE INSTRUCTIONS
Discharge Instructions and Follow-Up:   Discharge diet: Regular   Discharge activity: No heavy lifting for 6 week(s)  No sex for 6 week(s)   Discharge follow-up: Follow up with OB clinic in 6 weeks   Wound care: Drink plenty of fluids  Ice to area for comfort  Keep wound clean and dry      Postop  Birth Instructions    Activity       Do not lift more than 10 pounds for 6 weeks after surgery.  Ask family and friends for help when you need it.    No driving until you have stopped taking your pain medications (usually two weeks after surgery).    No heavy exercise or activity for 6 weeks.  Don't do anything that will put a strain on your surgery site.    Don't strain when using the toilet.  Your care team may prescribe a stool softener if you have problems with your bowel movements.     To care for your incision:       Keep the incision clean and dry.    Do not soak your incision in water. No swimming or hot tubs until it has fully healed. You may soak in the bathtub if the water level is below your incision.    Do not use peroxide, gel, cream, lotion, or ointment on your incision.    Adjust your clothes to avoid pressure on your surgery site (check the elastic in your underwear for example).     You may see a small amount of clear or pink drainage and this is normal.  Check with your health care provider:       If the drainage increases or has an odor.    If the incision reddens, you have swelling, or develop a rash.    If you have increased pain and the medicine we prescribed doesn't help.    If you have a fever above 100.4 F (38 C) with or without chills when placing thermometer under your tongue.   The area around your incision (surgery wound), will feel numb.  This is normal. The numbness should go away in less than a year.     Keep your hands clean:  Always wash your hands before touching your incision (surgery wound). This helps reduce your risk of infection. If your hands aren't dirty, you may use  an alcohol hand-rub to clean your hands. Keep your nails clean and short.    Call your healthcare provider if you have any of these symptoms:       You soak a sanitary pad with blood within 1 hour, or you see blood clots larger than a golf ball.    Bleeding that lasts more than 6 weeks.    Vaginal discharge that smells bad.    Severe pain, cramping or tenderness in your lower belly area.    A need to urinate more frequently (use the toilet more often), more urgently (use the toilet very quickly), or it burns when you urinate.    Nausea and vomiting.    Redness, swelling or pain around a vein in your leg.    Problems breastfeeding or a red or painful area on your breast.    Chest pain and cough or are gasping for air.    Problems with coping with sadness, anxiety or depression. If you have concerns about hurting yourself or the baby, call your provider immediately.      You have questions or concerns after you return home.

## 2018-12-21 ENCOUNTER — PRENATAL OFFICE VISIT (OUTPATIENT)
Dept: OBGYN | Facility: CLINIC | Age: 38
End: 2018-12-21
Payer: COMMERCIAL

## 2018-12-21 VITALS
TEMPERATURE: 97.6 F | WEIGHT: 115.2 LBS | DIASTOLIC BLOOD PRESSURE: 70 MMHG | OXYGEN SATURATION: 98 % | BODY MASS INDEX: 20.41 KG/M2 | HEART RATE: 83 BPM | RESPIRATION RATE: 16 BRPM | SYSTOLIC BLOOD PRESSURE: 113 MMHG

## 2018-12-21 DIAGNOSIS — E03.8 OTHER SPECIFIED HYPOTHYROIDISM: ICD-10-CM

## 2018-12-21 DIAGNOSIS — D64.9 ANEMIA, UNSPECIFIED TYPE: ICD-10-CM

## 2018-12-21 PROBLEM — Z98.891 H/O: C-SECTION: Status: RESOLVED | Noted: 2018-03-30 | Resolved: 2018-12-21

## 2018-12-21 PROBLEM — Z23 NEED FOR TDAP VACCINATION: Status: RESOLVED | Noted: 2018-08-27 | Resolved: 2018-12-21

## 2018-12-21 PROBLEM — O99.820 GBS (GROUP B STREPTOCOCCUS CARRIER), +RV CULTURE, CURRENTLY PREGNANT: Status: RESOLVED | Noted: 2018-10-11 | Resolved: 2018-12-21

## 2018-12-21 PROBLEM — O09.529 ANTEPARTUM MULTIGRAVIDA OF ADVANCED MATERNAL AGE: Status: RESOLVED | Noted: 2018-03-30 | Resolved: 2018-12-21

## 2018-12-21 PROBLEM — Z98.891 S/P C-SECTION: Status: RESOLVED | Noted: 2018-11-07 | Resolved: 2018-12-21

## 2018-12-21 PROBLEM — Z34.80 SUPERVISION OF OTHER NORMAL PREGNANCY, ANTEPARTUM: Status: RESOLVED | Noted: 2018-03-30 | Resolved: 2018-12-21

## 2018-12-21 LAB
ERYTHROCYTE [DISTWIDTH] IN BLOOD BY AUTOMATED COUNT: 13.6 % (ref 10–15)
HCT VFR BLD AUTO: 41.9 % (ref 35–47)
HGB BLD-MCNC: 13.6 G/DL (ref 11.7–15.7)
MCH RBC QN AUTO: 26.9 PG (ref 26.5–33)
MCHC RBC AUTO-ENTMCNC: 32.5 G/DL (ref 31.5–36.5)
MCV RBC AUTO: 83 FL (ref 78–100)
PLATELET # BLD AUTO: 339 10E9/L (ref 150–450)
RBC # BLD AUTO: 5.06 10E12/L (ref 3.8–5.2)
T4 FREE SERPL-MCNC: 0.96 NG/DL (ref 0.76–1.46)
TSH SERPL DL<=0.005 MIU/L-ACNC: 0.51 MU/L (ref 0.4–4)
WBC # BLD AUTO: 7.9 10E9/L (ref 4–11)

## 2018-12-21 PROCEDURE — 36415 COLL VENOUS BLD VENIPUNCTURE: CPT | Performed by: OBSTETRICS & GYNECOLOGY

## 2018-12-21 PROCEDURE — 84443 ASSAY THYROID STIM HORMONE: CPT | Performed by: OBSTETRICS & GYNECOLOGY

## 2018-12-21 PROCEDURE — 85027 COMPLETE CBC AUTOMATED: CPT | Performed by: OBSTETRICS & GYNECOLOGY

## 2018-12-21 PROCEDURE — 84439 ASSAY OF FREE THYROXINE: CPT | Performed by: OBSTETRICS & GYNECOLOGY

## 2018-12-21 PROCEDURE — 99207 ZZC POST PARTUM EXAM: CPT | Performed by: OBSTETRICS & GYNECOLOGY

## 2018-12-21 ASSESSMENT — PATIENT HEALTH QUESTIONNAIRE - PHQ9
SUM OF ALL RESPONSES TO PHQ QUESTIONS 1-9: 1
5. POOR APPETITE OR OVEREATING: NOT AT ALL

## 2018-12-21 ASSESSMENT — ANXIETY QUESTIONNAIRES
3. WORRYING TOO MUCH ABOUT DIFFERENT THINGS: NOT AT ALL
IF YOU CHECKED OFF ANY PROBLEMS ON THIS QUESTIONNAIRE, HOW DIFFICULT HAVE THESE PROBLEMS MADE IT FOR YOU TO DO YOUR WORK, TAKE CARE OF THINGS AT HOME, OR GET ALONG WITH OTHER PEOPLE: NOT DIFFICULT AT ALL
5. BEING SO RESTLESS THAT IT IS HARD TO SIT STILL: NOT AT ALL
GAD7 TOTAL SCORE: 0
2. NOT BEING ABLE TO STOP OR CONTROL WORRYING: NOT AT ALL
1. FEELING NERVOUS, ANXIOUS, OR ON EDGE: NOT AT ALL
6. BECOMING EASILY ANNOYED OR IRRITABLE: NOT AT ALL
7. FEELING AFRAID AS IF SOMETHING AWFUL MIGHT HAPPEN: NOT AT ALL

## 2018-12-21 NOTE — PROGRESS NOTES
María is here for a 6-week postpartum checkup.    She had a Repeat  of a liveborn baby girl, weight 8 pounds 3 oz.  The delivery was uncomplicated.  Since delivery, she has been breast feeding.  She has not had a normal menses.  She has not had intercourse.  Patient screened for postpartum depression and complaints are NEGATIVE. Screening has also been completed for intimate partner violence.     Her last pap was  and was Normal    EXAM: /70 (BP Location: Right arm, Patient Position: Sitting, Cuff Size: Adult Regular)   Pulse 83   Temp 97.6  F (36.4  C) (Oral)   Resp 16   Wt 52.3 kg (115 lb 3.2 oz)   LMP 2018 (Approximate)   SpO2 98%   Breastfeeding? Yes   BMI 20.41 kg/m      HEENT: grossly normal.  NECK: no lymphadenopathy or thyromegaly.  ABDOMEN: soft, non tender, good bowel sounds, without masses, rebound, guarding or tenderness.  INC: well healed   EXTREMITIES: Warm to touch, no ankle edema or calf tenderness.    PELVIC:   deferred    A/P  Routine Postpartum    1. Contraception: condoms/rhythm  2. Annual due   3. Anemia: recheck CBC  4. Subclinical hypothyroid: recheck TFTs    Jayshree Garcia M.D.

## 2018-12-22 ASSESSMENT — ANXIETY QUESTIONNAIRES: GAD7 TOTAL SCORE: 0

## 2019-01-31 ENCOUNTER — OFFICE VISIT (OUTPATIENT)
Dept: FAMILY MEDICINE | Facility: CLINIC | Age: 39
End: 2019-01-31
Payer: COMMERCIAL

## 2019-01-31 VITALS
HEART RATE: 101 BPM | HEIGHT: 63 IN | DIASTOLIC BLOOD PRESSURE: 76 MMHG | SYSTOLIC BLOOD PRESSURE: 104 MMHG | BODY MASS INDEX: 19.7 KG/M2 | RESPIRATION RATE: 12 BRPM | WEIGHT: 111.2 LBS | OXYGEN SATURATION: 98 % | TEMPERATURE: 101.7 F

## 2019-01-31 DIAGNOSIS — R05.9 COUGH: Primary | ICD-10-CM

## 2019-01-31 DIAGNOSIS — J03.90 ACUTE TONSILLITIS, UNSPECIFIED ETIOLOGY: ICD-10-CM

## 2019-01-31 LAB
FLUAV+FLUBV AG SPEC QL: NEGATIVE
FLUAV+FLUBV AG SPEC QL: NEGATIVE
SPECIMEN SOURCE: NORMAL

## 2019-01-31 PROCEDURE — 87804 INFLUENZA ASSAY W/OPTIC: CPT | Performed by: FAMILY MEDICINE

## 2019-01-31 PROCEDURE — 99213 OFFICE O/P EST LOW 20 MIN: CPT | Performed by: FAMILY MEDICINE

## 2019-01-31 RX ORDER — PENICILLIN V POTASSIUM 500 MG/1
500 TABLET, FILM COATED ORAL 3 TIMES DAILY
Qty: 30 TABLET | Refills: 0 | Status: SHIPPED | OUTPATIENT
Start: 2019-01-31 | End: 2019-02-10

## 2019-01-31 ASSESSMENT — MIFFLIN-ST. JEOR: SCORE: 1158.53

## 2019-01-31 NOTE — NURSING NOTE
"Initial /76   Pulse 101   Temp 101.7  F (38.7  C) (Tympanic)   Resp 12   Ht 1.6 m (5' 3\")   Wt 50.4 kg (111 lb 3.2 oz)   SpO2 98%   BMI 19.70 kg/m   Estimated body mass index is 19.7 kg/m  as calculated from the following:    Height as of this encounter: 1.6 m (5' 3\").    Weight as of this encounter: 50.4 kg (111 lb 3.2 oz). .      "

## 2019-01-31 NOTE — PROGRESS NOTES
SUBJECTIVE:                                                    María Briones is 38 year old female   Chief Complaint   Patient presents with     Fever     Symptoms since . Sates fever intermittent (103.9 last night), dry cough (sounds like it should be productivem, but is not), body aches, and fatigue. Has tried tylenol to little effect.         Problem list and histories reviewed & adjusted, as indicated.  Additional history: as documented    Patient Active Problem List   Diagnosis     Irregular periods     Dysmenorrhea     Female infertility     Other specified hypothyroidism     Pes anserinus tendinitis or bursitis     Past Surgical History:   Procedure Laterality Date     C ORAL SURGERY PROCEDURE      wisdom teeth      SECTION N/A 10/12/2015    Procedure:  SECTION;  Surgeon: Jayshree Garcia MD;  Location: [facility]      SECTION N/A 2018    Procedure:  Section;  Surgeon: Jayshree Garcia MD;  Location: [facility]      SECTION  2018     LAPAROSCOPIC ABLATION ENDOMETRIOSIS  4/15/2014    Procedure: LAPAROSCOPIC ABLATION ENDOMETRIOSIS;  Surgeon: Mohamud Osorio MD;  Location: [facility]     LAPAROSCOPIC TUBAL DYE STUDY  4/15/2014    Procedure: LAPAROSCOPIC TUBAL DYE STUDY;  Surgeon: Mohamud Osorio MD;  Location: [facility]     LAPAROSCOPY DIAGNOSTIC (GYN)  4/15/2014    Procedure: Operative Laparoscopy, Excision Cauterization Of Endometriosis, Chromopertubation ;  Surgeon: Mohamud Osorio MD;  Location: [facility]       Social History     Tobacco Use     Smoking status: Never Smoker     Smokeless tobacco: Never Used   Substance Use Topics     Alcohol use: Yes     Comment: occas- quit with pregnancy     Family History   Problem Relation Age of Onset     Lipids Father      Hypertension Father      Cardiovascular Father         A fib     Cancer Maternal Grandmother         ovarian      Diabetes Maternal Grandfather      Heart Disease Maternal Grandfather      Cancer Maternal  "Grandfather         skin     Cardiovascular Maternal Grandfather         MI     Skin Cancer Maternal Grandfather      Thyroid Disease Brother         ?     Cancer Mother         leukemia     Bleeding Disorder Mother         anemia     Depression Mother      Cardiovascular Paternal Grandfather         MI     Genitourinary Problems Paternal Grandmother         kidney removed     Skin Cancer Maternal Aunt          Current Outpatient Medications   Medication Sig Dispense Refill     penicillin V (VEETID) 500 MG tablet Take 1 tablet (500 mg) by mouth 3 times daily for 10 days 30 tablet 0     Prenatal Vit-Fe Fumarate-FA (PRENATAL MULTIVITAMIN  PLUS IRON) 27-0.8 MG TABS Take 1 tablet by mouth daily       No Known Allergies  Recent Labs   Lab Test 12/21/18  1035 08/14/18  0816 05/23/18  1618  01/30/18  0752 10/24/16  0935 05/24/13  0846   A1C  --   --   --   --  5.2  --   --    LDL  --  158*  --   --   --  57 81   HDL  --  61  --   --   --  65 57   TRIG  --  184*  --   --   --  89 48   TSH 0.51  --  0.78   < > 2.06 2.11 1.03    < > = values in this interval not displayed.      BP Readings from Last 3 Encounters:   01/31/19 104/76   12/21/18 113/70   11/09/18 120/51    Wt Readings from Last 3 Encounters:   01/31/19 50.4 kg (111 lb 3.2 oz)   12/21/18 52.3 kg (115 lb 3.2 oz)   11/01/18 61.4 kg (135 lb 6.4 oz)         ROS:  Constitutional, HEENT, cardiovascular, pulmonary, gi and gu systems are negative, except as otherwise noted.    OBJECTIVE:                                                    /76   Pulse 101   Temp 101.7  F (38.7  C) (Tympanic)   Resp 12   Ht 1.6 m (5' 3\")   Wt 50.4 kg (111 lb 3.2 oz)   SpO2 98%   BMI 19.70 kg/m     GENERAL APPEARANCE ADULT: alert, appears ill, no distress, tired appearing  HENT: right TM abnormal, dull, left TM normal, throat/mouth:mild erythema, mucous membranes moist  NECK: No adenopathy,masses or thyromegaly  RESP: lungs clear to auscultation   CV: normal rate, regular rhythm, " no murmur or gallop  Diagnostic Test Results:  Results for orders placed or performed in visit on 01/31/19   Influenza A/B antigen   Result Value Ref Range    Influenza A/B Agn Specimen Nasal     Influenza A Negative NEG^Negative    Influenza B Negative NEG^Negative          ASSESSMENT/PLAN:                                                    1. Cough  - Influenza A/B antigen negative    2. Acute tonsillitis, unspecified etiology  Viral vs bacterial.  Trial of antibiotics but if not effective viral infection and self limiting.   - penicillin V (VEETID) 500 MG tablet; Take 1 tablet (500 mg) by mouth 3 times daily for 10 days  Dispense: 30 tablet; Refill: 0    Rocio Silva MD  [facility]

## 2019-02-06 ENCOUNTER — OFFICE VISIT (OUTPATIENT)
Dept: FAMILY MEDICINE | Facility: CLINIC | Age: 39
End: 2019-02-06
Payer: COMMERCIAL

## 2019-02-06 ENCOUNTER — ANCILLARY PROCEDURE (OUTPATIENT)
Dept: GENERAL RADIOLOGY | Facility: CLINIC | Age: 39
End: 2019-02-06
Attending: FAMILY MEDICINE
Payer: COMMERCIAL

## 2019-02-06 VITALS
BODY MASS INDEX: 19.63 KG/M2 | SYSTOLIC BLOOD PRESSURE: 92 MMHG | HEIGHT: 63 IN | RESPIRATION RATE: 18 BRPM | TEMPERATURE: 99.5 F | OXYGEN SATURATION: 93 % | HEART RATE: 95 BPM | WEIGHT: 110.8 LBS | DIASTOLIC BLOOD PRESSURE: 60 MMHG

## 2019-02-06 DIAGNOSIS — R50.9 FEVER, UNSPECIFIED FEVER CAUSE: ICD-10-CM

## 2019-02-06 DIAGNOSIS — R05.9 COUGH: ICD-10-CM

## 2019-02-06 DIAGNOSIS — J13 PNEUMONIA OF LEFT LOWER LOBE DUE TO STREPTOCOCCUS PNEUMONIAE (H): Primary | ICD-10-CM

## 2019-02-06 PROCEDURE — 71046 X-RAY EXAM CHEST 2 VIEWS: CPT

## 2019-02-06 PROCEDURE — 99213 OFFICE O/P EST LOW 20 MIN: CPT | Performed by: FAMILY MEDICINE

## 2019-02-06 RX ORDER — AZITHROMYCIN 250 MG/1
TABLET, FILM COATED ORAL
Qty: 6 TABLET | Refills: 0 | Status: SHIPPED | OUTPATIENT
Start: 2019-02-06 | End: 2019-02-08 | Stop reason: SINTOL

## 2019-02-06 ASSESSMENT — MIFFLIN-ST. JEOR: SCORE: 1156.72

## 2019-02-06 NOTE — PROGRESS NOTES
"  SUBJECTIVE:   María Briones is a 38 year old female who presents to clinic today for the following health issues:      ENT Symptoms             Symptoms: cc Present Absent Comment   Fever/Chills   x    Fatigue  x     Muscle Aches   x    Eye Irritation   x    Sneezing   x    Nasal Mitchell/Drg   x    Sinus Pressure/Pain   x    Loss of smell  x     Dental pain   x    Sore Throat   x    Swollen Glands   x    Ear Pain/Fullness   x    Cough x x     Wheeze   x    Chest Pain  x  Little bit   Shortness of breath  x  sometimes   Rash   x    Other   x      Symptom duration:  x10 days   Symptom severity:  moderate- severe   Treatments tried:  honey, tried kids cough syrup, tried all none medication treatments   Contacts:  none     Cough, fevers over 101 for 7 days (now resolved).  Was seen in clinic 1/31/18 treated with penicillin for potential tonsillitis. No congestion, just cough and breathing is becoming tight and painful.  Low appetite.    No history of asthma. One illness was prescribed albuterol after a cough that lingered but was not helpful.        Problem list and histories reviewed & adjusted, as indicated.  Additional history: as documented    BP Readings from Last 3 Encounters:   02/06/19 92/60   01/31/19 104/76   12/21/18 113/70    Wt Readings from Last 3 Encounters:   02/06/19 50.3 kg (110 lb 12.8 oz)   01/31/19 50.4 kg (111 lb 3.2 oz)   12/21/18 52.3 kg (115 lb 3.2 oz)                    Reviewed and updated as needed this visit by clinical staff       Reviewed and updated as needed this visit by Provider         ROS:  Constitutional, HEENT, cardiovascular, pulmonary, gi and gu systems are negative, except as otherwise noted.    OBJECTIVE:     BP 92/60   Pulse 95   Temp 99.5  F (37.5  C) (Tympanic)   Resp 18   Ht 1.6 m (5' 3\")   Wt 50.3 kg (110 lb 12.8 oz)   SpO2 93%   BMI 19.63 kg/m    Body mass index is 19.63 kg/m .  GENERAL: healthy, alert and no distress  HENT: ear canals and TM's normal, nose and mouth " without ulcers or lesions  NECK: no adenopathy, no asymmetry, masses, or scars and thyroid normal to palpation  RESP: lungs clear to auscultation - no rales, rhonchi or wheezes  CV: regular rate and rhythm, normal S1 S2, no S3 or S4, no murmur, click or rub, no peripheral edema and peripheral pulses strong  MS: no gross musculoskeletal defects noted, no edema    Diagnostic Test Results:  Chest Xray: LLL infiltrate    ASSESSMENT/PLAN:       María was seen today for cough.    Diagnoses and all orders for this visit:    Pneumonia of left lower lobe due to Streptococcus pneumoniae (H): discussed treatment and timing.  -     azithromycin (ZITHROMAX) 250 MG tablet; Take 2 tablets (500 mg) by mouth daily for 1 day, THEN 1 tablet (250 mg) daily for 4 days.    Cough  -     XR Chest 2 Views; Future    Fever, unspecified fever cause  -     XR Chest 2 Views; Future        Patient Instructions       Patient Education     When You Have Pneumonia  You have been diagnosed with pneumonia. This is a serious lung infection. Most cases of pneumonia are caused by bacteria. Pneumonia most often occurs in older adults, young children, and people with chronic health problems.  Home care    Take your medicine exactly as directed. Don t skip doses. Continue taking your antibiotics as until they are all gone, even if you start to feel better. This will prevent the pneumonia from coming back.    Drink at least 8 glasses of water daily, unless directed otherwise. This helps to loosen and thin secretions so that you can cough them up.    Use a cool-mist humidifier in your bedroom. Be sure to clean the humidifier daily.    Don t use medicines to suppress your cough unless your cough is dry, painful, or interferes with your sleep. Coughing up mucus is normal. You may use an expectorant if your healthcare provider says it s okay.    You can use warm compresses or a heating pad on the lowest setting to relieve chest discomfort. Use several times a  day for 15-20 minutes at a time. To prevent injury to your skin, set the temperature to warm, not hot. Don t put the compress or pad directly on your skin. Make certain it has a cover or wrap it in a towel. This is to prevent skin burns.    Get plenty of rest until your fever, shortness of breath, and chest pain go away.    Plan to get a flu shot every year. The flu is a common cause of pneumonia. Getting a flu shot every year can help prevent both the flu and pneumonia.  Getting the pneumococcal vaccine  Talk with your healthcare provider about getting the pneumococcal vaccine. Pneumococcal pneumonia is caused by bacteria that spread from person to person. It can cause minor problems, such as ear infections. But it can also turn into life-threatening illnesses of the lungs (pneumonia), the covering of the brain and spinal cord (meningitis), and the blood (bacteremia).  Children under 2 years of age, adults over age 65, people with certain health conditions, and smokers are at the highest risk of pneumococcal disease. This vaccine can help prevent pneumococcal disease in both adults and children. Some people should not have the vaccine. Make sure to ask your healthcare provider if you should have the vaccine.   Follow-up care  Make a follow-up appointment as directed by our staff.  When to call your healthcare provider  Call your healthcare provider right away if you have any of the following:    Fever of 100.4 F (38 C) or higher, or as directed by your healthcare provider    Mucus from the lungs (sputum) that s yellow, green, bloody, or smells bad    A large amount of sputum    Vomiting    Symptoms that get worse  Call 911  Call 911 right away if you have any of the following:    Chest pain    Trouble breathing    Blue lips or fingernails   Date Last Reviewed: 11/1/2016 2000-2018 [employer]. [address] [place] [address], [place] [address]. All rights reserved. This information is not intended as a  substitute for professional medical care. Always follow your healthcare professional's instructions.           Patient Education     Treating Pneumonia  Pneumonia is an infection of one or both of the lungs. Pneumonia:    Is usually caused by either a virus or a bacteria    Can be very serious, especially in infants, young children, and older adults. It s also serious for those with other long-term health problems or weakened immune systems.    Is sometimes treated at home and sometimes in the hospital    Antibiotic medicines  Antibiotics may be prescribed for pneumonia caused by bacteria. They may be pills (oral medicines), or shots (injections). Or they may be given by IV (intravenously) into a vein. If you are taking oral medicines at home:    Fill your prescription and start taking your medicine as soon as you can.    You will likely start to feel better in a day or 2, but don t stop taking the antibiotic.    Use a pill organizer to help you remember to take your medicine.    Let your healthcare provider know if you have side effects.    Take your medicine exactly as directed on the label. Talk to your provider or pharmacist if you have any questions.  Antiviral medicines  Antiviral medicine may be prescribed for pneumonia caused by a virus. For example, antiviral medicine may be prescribed for pneumonia caused by the flu virus. Antibiotics do not work against viruses. If you are taking antiviral medicine at home:    Fill your prescription and start taking your medicine as soon as you can.    Talk with your provider or pharmacist about possible side effects.    Take the medicine exactly as instructed.  To relieve symptoms  There are many medicines that can help relieve symptoms of pneumonia. Some are prescription and some are over-the-counter.  Your healthcare provider may recommend:    Acetaminophen or ibuprofen to lower your fever and to lessen headache or other pain    Cough medicine to loosen mucus or to  reduce coughing  Make sure you check with your healthcare provider or pharmacist before taking any over-the-counter medicines.  Special treatments  If you are hospitalized for pneumonia, you may have other therapies, including:    Inhaled medicines to help with breathing or chest congestion    Supplemental oxygen to increase low oxygen levels  Drink fluids and eat healthy  You should eat healthy to help your body fight the infection. Drinking a lot of fluids helps to replace fluids lost from fever and to loosen mucus in your chest.    Diet. Make healthy food choices, including fruits and vegetables, lean meats and other proteins, 100% whole grain and low- or no-fat dairy products.    Fluids. Drink at least 6 to 8 tall glasses a day. Water and 100% fruit or vegetable juice are best.  Get plenty of rest and sleep  You may be more tired than usual for a while. It is important to get enough sleep at night. It s also important to rest during the day. Talk with your healthcare provider if coughing or other symptoms are interfering with your sleep.  Preventing the spread of germs  The best thing you can do to prevent spreading germs is to wash your hands often. You should:    Rub your hand with soap and water for 20 to 30 seconds.    Clean in between your fingers, the backs of your hands, and around your nails.    Dry your hands on a separate towel or use paper towels.  You should also:    Keep alcohol-based hand  nearby.    Make sure you also clean surfaces that you touch. Use a product that kills all types of germs.    Stay away from others until you are feeling better.  When to call your healthcare provider  Call your healthcare provider if you have any of the following:    Symptoms get worse    Fever continues    Shortness of breath gets worse    Increased mucus or mucus that is darker in color    Coughing gets worse    Lips or fingers are bluish in color    Side effects from your medicine   Date Last Reviewed:  12/1/2016 2000-2018 [employer]. [address] [place] [address], [place] [address]. All rights reserved. This information is not intended as a substitute for professional medical care. Always follow your healthcare professional's instructions.               Mohamud Lowery MD  [facility]

## 2019-02-06 NOTE — PATIENT INSTRUCTIONS
Patient Education     When You Have Pneumonia  You have been diagnosed with pneumonia. This is a serious lung infection. Most cases of pneumonia are caused by bacteria. Pneumonia most often occurs in older adults, young children, and people with chronic health problems.  Home care    Take your medicine exactly as directed. Don t skip doses. Continue taking your antibiotics as until they are all gone, even if you start to feel better. This will prevent the pneumonia from coming back.    Drink at least 8 glasses of water daily, unless directed otherwise. This helps to loosen and thin secretions so that you can cough them up.    Use a cool-mist humidifier in your bedroom. Be sure to clean the humidifier daily.    Don t use medicines to suppress your cough unless your cough is dry, painful, or interferes with your sleep. Coughing up mucus is normal. You may use an expectorant if your healthcare provider says it s okay.    You can use warm compresses or a heating pad on the lowest setting to relieve chest discomfort. Use several times a day for 15-20 minutes at a time. To prevent injury to your skin, set the temperature to warm, not hot. Don t put the compress or pad directly on your skin. Make certain it has a cover or wrap it in a towel. This is to prevent skin burns.    Get plenty of rest until your fever, shortness of breath, and chest pain go away.    Plan to get a flu shot every year. The flu is a common cause of pneumonia. Getting a flu shot every year can help prevent both the flu and pneumonia.  Getting the pneumococcal vaccine  Talk with your healthcare provider about getting the pneumococcal vaccine. Pneumococcal pneumonia is caused by bacteria that spread from person to person. It can cause minor problems, such as ear infections. But it can also turn into life-threatening illnesses of the lungs (pneumonia), the covering of the brain and spinal cord (meningitis), and the blood (bacteremia).  Children under 2  years of age, adults over age 65, people with certain health conditions, and smokers are at the highest risk of pneumococcal disease. This vaccine can help prevent pneumococcal disease in both adults and children. Some people should not have the vaccine. Make sure to ask your healthcare provider if you should have the vaccine.   Follow-up care  Make a follow-up appointment as directed by our staff.  When to call your healthcare provider  Call your healthcare provider right away if you have any of the following:    Fever of 100.4 F (38 C) or higher, or as directed by your healthcare provider    Mucus from the lungs (sputum) that s yellow, green, bloody, or smells bad    A large amount of sputum    Vomiting    Symptoms that get worse  Call 911  Call 911 right away if you have any of the following:    Chest pain    Trouble breathing    Blue lips or fingernails   Date Last Reviewed: 11/1/2016 2000-2018 [employer]. [address] [place] [address]. All rights reserved. This information is not intended as a substitute for professional medical care. Always follow your healthcare professional's instructions.           Patient Education     Treating Pneumonia  Pneumonia is an infection of one or both of the lungs. Pneumonia:    Is usually caused by either a virus or a bacteria    Can be very serious, especially in infants, young children, and older adults. It s also serious for those with other long-term health problems or weakened immune systems.    Is sometimes treated at home and sometimes in the hospital    Antibiotic medicines  Antibiotics may be prescribed for pneumonia caused by bacteria. They may be pills (oral medicines), or shots (injections). Or they may be given by IV (intravenously) into a vein. If you are taking oral medicines at home:    Fill your prescription and start taking your medicine as soon as you can.    You will likely start to feel better in a day or 2, but don t stop  taking the antibiotic.    Use a pill organizer to help you remember to take your medicine.    Let your healthcare provider know if you have side effects.    Take your medicine exactly as directed on the label. Talk to your provider or pharmacist if you have any questions.  Antiviral medicines  Antiviral medicine may be prescribed for pneumonia caused by a virus. For example, antiviral medicine may be prescribed for pneumonia caused by the flu virus. Antibiotics do not work against viruses. If you are taking antiviral medicine at home:    Fill your prescription and start taking your medicine as soon as you can.    Talk with your provider or pharmacist about possible side effects.    Take the medicine exactly as instructed.  To relieve symptoms  There are many medicines that can help relieve symptoms of pneumonia. Some are prescription and some are over-the-counter.  Your healthcare provider may recommend:    Acetaminophen or ibuprofen to lower your fever and to lessen headache or other pain    Cough medicine to loosen mucus or to reduce coughing  Make sure you check with your healthcare provider or pharmacist before taking any over-the-counter medicines.  Special treatments  If you are hospitalized for pneumonia, you may have other therapies, including:    Inhaled medicines to help with breathing or chest congestion    Supplemental oxygen to increase low oxygen levels  Drink fluids and eat healthy  You should eat healthy to help your body fight the infection. Drinking a lot of fluids helps to replace fluids lost from fever and to loosen mucus in your chest.    Diet. Make healthy food choices, including fruits and vegetables, lean meats and other proteins, 100% whole grain and low- or no-fat dairy products.    Fluids. Drink at least 6 to 8 tall glasses a day. Water and 100% fruit or vegetable juice are best.  Get plenty of rest and sleep  You may be more tired than usual for a while. It is important to get enough  sleep at night. It s also important to rest during the day. Talk with your healthcare provider if coughing or other symptoms are interfering with your sleep.  Preventing the spread of germs  The best thing you can do to prevent spreading germs is to wash your hands often. You should:    Rub your hand with soap and water for 20 to 30 seconds.    Clean in between your fingers, the backs of your hands, and around your nails.    Dry your hands on a separate towel or use paper towels.  You should also:    Keep alcohol-based hand  nearby.    Make sure you also clean surfaces that you touch. Use a product that kills all types of germs.    Stay away from others until you are feeling better.  When to call your healthcare provider  Call your healthcare provider if you have any of the following:    Symptoms get worse    Fever continues    Shortness of breath gets worse    Increased mucus or mucus that is darker in color    Coughing gets worse    Lips or fingers are bluish in color    Side effects from your medicine   Date Last Reviewed: 12/1/2016 2000-2018 [employer]. [address] [place] [address], [place] [address]. All rights reserved. This information is not intended as a substitute for professional medical care. Always follow your healthcare professional's instructions.

## 2019-02-08 ENCOUNTER — MYC MEDICAL ADVICE (OUTPATIENT)
Dept: FAMILY MEDICINE | Facility: CLINIC | Age: 39
End: 2019-02-08

## 2019-02-08 DIAGNOSIS — J13 PNEUMONIA OF LEFT LOWER LOBE DUE TO STREPTOCOCCUS PNEUMONIAE (H): Primary | ICD-10-CM

## 2019-02-08 RX ORDER — LEVOFLOXACIN 750 MG/1
750 TABLET, FILM COATED ORAL DAILY
Qty: 7 TABLET | Refills: 0 | Status: SHIPPED | OUTPATIENT
Start: 2019-02-08 | End: 2019-02-15

## 2019-02-08 RX ORDER — LEVOFLOXACIN 750 MG/1
750 TABLET, FILM COATED ORAL DAILY
Qty: 10 TABLET | Refills: 0 | Status: SHIPPED | OUTPATIENT
Start: 2019-02-08 | End: 2019-02-08

## 2019-02-08 NOTE — TELEPHONE ENCOUNTER
Dr. Loewry,    Please see patient's mychart. She believes she is having a reaction to the Z-javier. Please advise, is there anything else we can prescribe for her?    Thanks,  Edda DUTTON RN

## 2019-02-08 NOTE — TELEPHONE ENCOUNTER
Patient received message about discontinuing z-javier and starting levaquin.  She would like to know if levaquin is ok during nursing?    Routing to provider.  Xiomara IYER RN

## 2019-09-27 ENCOUNTER — OFFICE VISIT (OUTPATIENT)
Dept: FAMILY MEDICINE | Facility: CLINIC | Age: 39
End: 2019-09-27
Payer: COMMERCIAL

## 2019-09-27 VITALS
BODY MASS INDEX: 18.95 KG/M2 | HEART RATE: 61 BPM | TEMPERATURE: 97.8 F | WEIGHT: 107 LBS | DIASTOLIC BLOOD PRESSURE: 74 MMHG | OXYGEN SATURATION: 100 % | SYSTOLIC BLOOD PRESSURE: 110 MMHG | RESPIRATION RATE: 16 BRPM

## 2019-09-27 DIAGNOSIS — Z23 NEED FOR PROPHYLACTIC VACCINATION AND INOCULATION AGAINST INFLUENZA: ICD-10-CM

## 2019-09-27 DIAGNOSIS — K43.0 INCISIONAL HERNIA WITH OBSTRUCTION BUT NO GANGRENE: Primary | ICD-10-CM

## 2019-09-27 PROCEDURE — 99213 OFFICE O/P EST LOW 20 MIN: CPT | Performed by: FAMILY MEDICINE

## 2019-09-27 PROCEDURE — 90686 IIV4 VACC NO PRSV 0.5 ML IM: CPT | Performed by: FAMILY MEDICINE

## 2019-09-27 PROCEDURE — 90471 IMMUNIZATION ADMIN: CPT | Performed by: FAMILY MEDICINE

## 2019-09-27 NOTE — PROGRESS NOTES
Subjective     María Briones is a 39 year old female who presents to clinic today for the following health issues:    HPI   ABDOMINAL   PAIN     Onset: x 3 weeks. Patient states she thinks its a hernia. Patient states the hernia pops out and she can sometimes push it back in.    Description:   Character: Dull ache  Location: Belly button.  Radiation: Across the stomach    Intensity: 0/10- Pain gets worse when it decides to pop out.    Progression of Symptoms:  worsening    Accompanying Signs & Symptoms:  Fever/Chills?: no   Gas/Bloating: no   Nausea: YES  Vomitting: no   Diarrhea?: no   Constipation:no   Dysuria or Hematuria: no    History:   Trauma: no- just babies. Youngest is 10 months.  Previous similar pain: no    Previous tests done: none    Precipitating factors:   Does the pain change with:     Food: no      BM: no     Urination: no     Alleviating factors:  Lifting can make the hernia pop out.    Therapies Tried and outcome: nothign    LMP:  9/17/2019       Noticed this first after first child without pain.  After second child this has been worsening and at times the bulge gets stuck and painful and will get relief if can reduce it. Did have a day that had trouble and wasn't able to reduce until a day later.  Laparoscopy in 2014.    No nausea, no constipation.      BP Readings from Last 3 Encounters:   09/27/19 110/74   02/06/19 92/60   01/31/19 104/76    Wt Readings from Last 3 Encounters:   09/27/19 48.5 kg (107 lb)   02/06/19 50.3 kg (110 lb 12.8 oz)   01/31/19 50.4 kg (111 lb 3.2 oz)                 Reviewed and updated as needed this visit by Provider         Review of Systems   ROS COMP: Constitutional, HEENT, cardiovascular, pulmonary, gi and gu systems are negative, except as otherwise noted.      Objective    /74   Pulse 61   Temp 97.8  F (36.6  C) (Tympanic)   Resp 16   Wt 48.5 kg (107 lb)   LMP 09/17/2019 (Approximate)   SpO2 100%   BMI 18.95 kg/m    Body mass index is 18.95  kg/m .  Physical Exam   GENERAL: healthy, alert and no distress  ABDOMEN: soft, nontender, without hepatosplenomegaly or masses, bowel sounds normal and umbilicus smaller than pinkie finger defect in fascia, no current hernia.    Diagnostic Test Results:  Labs reviewed in Epic        Assessment & Plan     María was seen today for hernia.    Diagnoses and all orders for this visit:    Incisional hernia with obstruction but no gangrene  -     GENERAL SURG ADULT REFERRAL           Patient Instructions   When the hernia comes, do lay back and relax and then attempt to reduce it.    If very painful and unable to reduce then come to ER.      Patient Education     Hernia (Adult)    A hernia can happen when there is a weakness or defect in the wall of the abdomen or groin. Intestines or nearby tissues may move from their usual location and push through the weakness in the wall. This can cause a hernia (bulge) you may see or feel.  Causes and risk factors   A hernia may be present at birth. Or it may be caused by the wear and tear of daily living. Certain factors can make a hernia more likely. These can include:    Heavy lifting    Straining, whether from lifting, movement, or constipation    Chronic cough    Injury to the abdominal wall    Excess weight    Pregnancy    Prior surgery    Older age    Family history of hernia  Symptoms  Symptoms of a hernia may come on suddenly. Or they may appear slowly over time. Some common symptoms include:    Bulge in the groin area, around the navel, or in the scrotum (the bulge may get bigger when you stand and go away when you lie down)    Pain or pressure around the bulge    Pain during activities such as lifting, coughing, or sneezing    A feeling of weakness or pressure in the groin    Pain or swelling in the scrotum  Types of hernias  There are different types of hernia. The type you have depends on its location:    Inguinal. This type is in the groin or scrotum. It is more common  in men. But, women can get this hernia, too.    Femoral. This type is in the groin, upper thigh (where the leg bends), or labia. It is more common in women.    Ventral. This type is in the abdominal wall.    Umbilical. This type occurs around the navel (belly button).    Incisional. This type occurs at the site of a previous surgery.  The condition of the hernia can help determine how urgently it needs to be treated.    Reducible. It goes back in by itself, or it can be pushed back in.    Irreducible. It can t be pushed back in.    Incarcerated/strangulated. The intestine is trapped (incarcerated). If this happens, you won t be able to push the bulge back in. If the incarcerated hernia isn t treated, it may become strangulated. This means the area loses blood supply and the tissue may die. This requires emergency surgery. You need treatment right away.  In most cases, a hernia will not heal on its own.You may need surgery to repair the defect in the abdominal wall or groin. You ll be told more about surgery, if needed.  If your symptoms are not severe, treatment may sometimes be delayed. In such cases, you will need regular follow-up visits with the provider. You ll be asked to keep track of your symptoms and to watch for signs of more serious problems. You may also be given guidelines similar to the home care instructions below.  Home care  To help keep a hernia from getting worse, you may be advised to:    Avoid heavy lifting and straining as directed.    Take steps to prevent constipation, such as eating more fiber and drinking more water. This may help reduce straining that can occur when having a bowel movement. Reducing straining may help keep your symptoms from getting worse.    Maintain a healthy weight or lose excess weight. This can help reduce strain on abdominal muscles and tissues.    Stop smoking. This can help prevent coughing that may also strain abdominal muscles and tissues.  Follow-up care  Follow  up with your healthcare provider, or as directed. If imaging tests were done, they will be reviewed a doctor. You will be told the results and any new findings that may affect your care.  When to seek medical advice  Call your healthcare provider right away if any of these occur:    Hernia hardens, swells, or grows larger    Hernia can no longer be pushed back in    Pain moves to the lower right abdomen (just below the waistline), or spreads to the back  Call 911  Call 911 if any of these occur:    Severe pain, redness, or tenderness in the area near the hernia    Pain worsens quickly and doesn t get better    Inability to have a bowel movement or pass gas    Fever of 100.4 F (38 C) or higher, or as directed by your healthcare provider  Date Last Reviewed: 3/1/2018    9563-6226 [employer]. [address] [place] [address]. All rights reserved. This information is not intended as a substitute for professional medical care. Always follow your healthcare professional's instructions.           Patient Education     Hernia (Adult)    A hernia can happen when there is a weakness or defect in the wall of the abdomen or groin. Intestines or nearby tissues may move from their usual location and push through the weakness in the wall. This can cause a hernia (bulge) you may see or feel.  Causes and risk factors   A hernia may be present at birth. Or it may be caused by the wear and tear of daily living. Certain factors can make a hernia more likely. These can include:    Heavy lifting    Straining, whether from lifting, movement, or constipation    Chronic cough    Injury to the abdominal wall    Excess weight    Pregnancy    Prior surgery    Older age    Family history of hernia  Symptoms  Symptoms of a hernia may come on suddenly. Or they may appear slowly over time. Some common symptoms include:    Bulge in the groin area, around the navel, or in the scrotum (the bulge may get bigger when you stand  and go away when you lie down)    Pain or pressure around the bulge    Pain during activities such as lifting, coughing, or sneezing    A feeling of weakness or pressure in the groin    Pain or swelling in the scrotum  Types of hernias  There are different types of hernia. The type you have depends on its location:    Inguinal. This type is in the groin or scrotum. It is more common in men. But, women can get this hernia, too.    Femoral. This type is in the groin, upper thigh (where the leg bends), or labia. It is more common in women.    Ventral. This type is in the abdominal wall.    Umbilical. This type occurs around the navel (belly button).    Incisional. This type occurs at the site of a previous surgery.  The condition of the hernia can help determine how urgently it needs to be treated.    Reducible. It goes back in by itself, or it can be pushed back in.    Irreducible. It can t be pushed back in.    Incarcerated/strangulated. The intestine is trapped (incarcerated). If this happens, you won t be able to push the bulge back in. If the incarcerated hernia isn t treated, it may become strangulated. This means the area loses blood supply and the tissue may die. This requires emergency surgery. You need treatment right away.  In most cases, a hernia will not heal on its own.You may need surgery to repair the defect in the abdominal wall or groin. You ll be told more about surgery, if needed.  If your symptoms are not severe, treatment may sometimes be delayed. In such cases, you will need regular follow-up visits with the provider. You ll be asked to keep track of your symptoms and to watch for signs of more serious problems. You may also be given guidelines similar to the home care instructions below.  Home care  To help keep a hernia from getting worse, you may be advised to:    Avoid heavy lifting and straining as directed.    Take steps to prevent constipation, such as eating more fiber and drinking more  water. This may help reduce straining that can occur when having a bowel movement. Reducing straining may help keep your symptoms from getting worse.    Maintain a healthy weight or lose excess weight. This can help reduce strain on abdominal muscles and tissues.    Stop smoking. This can help prevent coughing that may also strain abdominal muscles and tissues.  Follow-up care  Follow up with your healthcare provider, or as directed. If imaging tests were done, they will be reviewed a doctor. You will be told the results and any new findings that may affect your care.  When to seek medical advice  Call your healthcare provider right away if any of these occur:    Hernia hardens, swells, or grows larger    Hernia can no longer be pushed back in    Pain moves to the lower right abdomen (just below the waistline), or spreads to the back  Call 911  Call 911 if any of these occur:    Severe pain, redness, or tenderness in the area near the hernia    Pain worsens quickly and doesn t get better    Inability to have a bowel movement or pass gas    Fever of 100.4 F (38 C) or higher, or as directed by your healthcare provider  Date Last Reviewed: 3/1/2018    9652-8797 [employer]. [address] [place] [address], [place] [address]. All rights reserved. This information is not intended as a substitute for professional medical care. Always follow your healthcare professional's instructions.               Return if symptoms worsen or fail to improve.    Mohamud Lowery MD  [facility]

## 2019-09-27 NOTE — PATIENT INSTRUCTIONS
When the hernia comes, do lay back and relax and then attempt to reduce it.    If very painful and unable to reduce then come to ER.      Patient Education     Hernia (Adult)    A hernia can happen when there is a weakness or defect in the wall of the abdomen or groin. Intestines or nearby tissues may move from their usual location and push through the weakness in the wall. This can cause a hernia (bulge) you may see or feel.  Causes and risk factors   A hernia may be present at birth. Or it may be caused by the wear and tear of daily living. Certain factors can make a hernia more likely. These can include:    Heavy lifting    Straining, whether from lifting, movement, or constipation    Chronic cough    Injury to the abdominal wall    Excess weight    Pregnancy    Prior surgery    Older age    Family history of hernia  Symptoms  Symptoms of a hernia may come on suddenly. Or they may appear slowly over time. Some common symptoms include:    Bulge in the groin area, around the navel, or in the scrotum (the bulge may get bigger when you stand and go away when you lie down)    Pain or pressure around the bulge    Pain during activities such as lifting, coughing, or sneezing    A feeling of weakness or pressure in the groin    Pain or swelling in the scrotum  Types of hernias  There are different types of hernia. The type you have depends on its location:    Inguinal. This type is in the groin or scrotum. It is more common in men. But, women can get this hernia, too.    Femoral. This type is in the groin, upper thigh (where the leg bends), or labia. It is more common in women.    Ventral. This type is in the abdominal wall.    Umbilical. This type occurs around the navel (belly button).    Incisional. This type occurs at the site of a previous surgery.  The condition of the hernia can help determine how urgently it needs to be treated.    Reducible. It goes back in by itself, or it can be pushed back in.    Irreducible.  It can t be pushed back in.    Incarcerated/strangulated. The intestine is trapped (incarcerated). If this happens, you won t be able to push the bulge back in. If the incarcerated hernia isn t treated, it may become strangulated. This means the area loses blood supply and the tissue may die. This requires emergency surgery. You need treatment right away.  In most cases, a hernia will not heal on its own.You may need surgery to repair the defect in the abdominal wall or groin. You ll be told more about surgery, if needed.  If your symptoms are not severe, treatment may sometimes be delayed. In such cases, you will need regular follow-up visits with the provider. You ll be asked to keep track of your symptoms and to watch for signs of more serious problems. You may also be given guidelines similar to the home care instructions below.  Home care  To help keep a hernia from getting worse, you may be advised to:    Avoid heavy lifting and straining as directed.    Take steps to prevent constipation, such as eating more fiber and drinking more water. This may help reduce straining that can occur when having a bowel movement. Reducing straining may help keep your symptoms from getting worse.    Maintain a healthy weight or lose excess weight. This can help reduce strain on abdominal muscles and tissues.    Stop smoking. This can help prevent coughing that may also strain abdominal muscles and tissues.  Follow-up care  Follow up with your healthcare provider, or as directed. If imaging tests were done, they will be reviewed a doctor. You will be told the results and any new findings that may affect your care.  When to seek medical advice  Call your healthcare provider right away if any of these occur:    Hernia hardens, swells, or grows larger    Hernia can no longer be pushed back in    Pain moves to the lower right abdomen (just below the waistline), or spreads to the back  Call 911  Call 911 if any of these  occur:    Severe pain, redness, or tenderness in the area near the hernia    Pain worsens quickly and doesn t get better    Inability to have a bowel movement or pass gas    Fever of 100.4 F (38 C) or higher, or as directed by your healthcare provider  Date Last Reviewed: 3/1/2018    2038-4733 [employer]. [address] [place] [address]. All rights reserved. This information is not intended as a substitute for professional medical care. Always follow your healthcare professional's instructions.           Patient Education     Hernia (Adult)    A hernia can happen when there is a weakness or defect in the wall of the abdomen or groin. Intestines or nearby tissues may move from their usual location and push through the weakness in the wall. This can cause a hernia (bulge) you may see or feel.  Causes and risk factors   A hernia may be present at birth. Or it may be caused by the wear and tear of daily living. Certain factors can make a hernia more likely. These can include:    Heavy lifting    Straining, whether from lifting, movement, or constipation    Chronic cough    Injury to the abdominal wall    Excess weight    Pregnancy    Prior surgery    Older age    Family history of hernia  Symptoms  Symptoms of a hernia may come on suddenly. Or they may appear slowly over time. Some common symptoms include:    Bulge in the groin area, around the navel, or in the scrotum (the bulge may get bigger when you stand and go away when you lie down)    Pain or pressure around the bulge    Pain during activities such as lifting, coughing, or sneezing    A feeling of weakness or pressure in the groin    Pain or swelling in the scrotum  Types of hernias  There are different types of hernia. The type you have depends on its location:    Inguinal. This type is in the groin or scrotum. It is more common in men. But, women can get this hernia, too.    Femoral. This type is in the groin, upper thigh (where the  leg bends), or labia. It is more common in women.    Ventral. This type is in the abdominal wall.    Umbilical. This type occurs around the navel (belly button).    Incisional. This type occurs at the site of a previous surgery.  The condition of the hernia can help determine how urgently it needs to be treated.    Reducible. It goes back in by itself, or it can be pushed back in.    Irreducible. It can t be pushed back in.    Incarcerated/strangulated. The intestine is trapped (incarcerated). If this happens, you won t be able to push the bulge back in. If the incarcerated hernia isn t treated, it may become strangulated. This means the area loses blood supply and the tissue may die. This requires emergency surgery. You need treatment right away.  In most cases, a hernia will not heal on its own.You may need surgery to repair the defect in the abdominal wall or groin. You ll be told more about surgery, if needed.  If your symptoms are not severe, treatment may sometimes be delayed. In such cases, you will need regular follow-up visits with the provider. You ll be asked to keep track of your symptoms and to watch for signs of more serious problems. You may also be given guidelines similar to the home care instructions below.  Home care  To help keep a hernia from getting worse, you may be advised to:    Avoid heavy lifting and straining as directed.    Take steps to prevent constipation, such as eating more fiber and drinking more water. This may help reduce straining that can occur when having a bowel movement. Reducing straining may help keep your symptoms from getting worse.    Maintain a healthy weight or lose excess weight. This can help reduce strain on abdominal muscles and tissues.    Stop smoking. This can help prevent coughing that may also strain abdominal muscles and tissues.  Follow-up care  Follow up with your healthcare provider, or as directed. If imaging tests were done, they will be reviewed a  doctor. You will be told the results and any new findings that may affect your care.  When to seek medical advice  Call your healthcare provider right away if any of these occur:    Hernia hardens, swells, or grows larger    Hernia can no longer be pushed back in    Pain moves to the lower right abdomen (just below the waistline), or spreads to the back  Call 911  Call 911 if any of these occur:    Severe pain, redness, or tenderness in the area near the hernia    Pain worsens quickly and doesn t get better    Inability to have a bowel movement or pass gas    Fever of 100.4 F (38 C) or higher, or as directed by your healthcare provider  Date Last Reviewed: 3/1/2018    1863-0953 [employer]. [address] [place] [address], [place] [address]. All rights reserved. This information is not intended as a substitute for professional medical care. Always follow your healthcare professional's instructions.

## 2019-10-10 ENCOUNTER — OFFICE VISIT (OUTPATIENT)
Dept: SURGERY | Facility: CLINIC | Age: 39
End: 2019-10-10
Payer: COMMERCIAL

## 2019-10-10 VITALS
WEIGHT: 107 LBS | HEIGHT: 63 IN | RESPIRATION RATE: 14 BRPM | SYSTOLIC BLOOD PRESSURE: 124 MMHG | BODY MASS INDEX: 18.96 KG/M2 | DIASTOLIC BLOOD PRESSURE: 71 MMHG | HEART RATE: 68 BPM

## 2019-10-10 DIAGNOSIS — K43.2 INCISIONAL HERNIA, WITHOUT OBSTRUCTION OR GANGRENE: Primary | ICD-10-CM

## 2019-10-10 PROCEDURE — 99204 OFFICE O/P NEW MOD 45 MIN: CPT | Performed by: SURGERY

## 2019-10-10 ASSESSMENT — MIFFLIN-ST. JEOR: SCORE: 1134.48

## 2019-10-10 NOTE — LETTER
10/10/2019         RE: María Briones  [id number] [address] [place] [id number]        Dear Colleague,    Thank you for referring your patient, María Briones, to the [facility]. Please see a copy of my visit note below.    Surgical Consultation/History and Physical  [facility]    María is seen in consultation for umbilical hernia, at the request of Physician No Ref-Primary.    Chief Complaint:  Umbilical hernia    History of Present Illness: María Briones is a 39 year old female presents with umbilical hernia.  This has been increasing since child birth.  She has noted a bulge at the site which occasionally becomes painful when it does come out.  She has been able to push this back in every time since that time.  She admits no nausea or vomiting.  No overlying skin changes.     Patient Active Problem List   Diagnosis     Irregular periods     Dysmenorrhea     Female infertility     Other specified hypothyroidism     Pes anserinus tendinitis or bursitis     Past Medical History:   Diagnosis Date     Chickenpox      Palpitations 2008     Urinary tract bacterial infections     as chold     Past Surgical History:   Procedure Laterality Date     C ORAL SURGERY PROCEDURE      wisdom teeth      SECTION N/A 10/12/2015    Procedure:  SECTION;  Surgeon: Jayshree Garcia MD;  Location: [facility]      SECTION N/A 2018    Procedure:  Section;  Surgeon: Jayshree Garcia MD;  Location: [facility]      SECTION  2018     LAPAROSCOPIC ABLATION ENDOMETRIOSIS  4/15/2014    Procedure: LAPAROSCOPIC ABLATION ENDOMETRIOSIS;  Surgeon: Mohamud Osorio MD;  [facility]     LAPAROSCOPIC TUBAL DYE STUDY  4/15/2014    Procedure: LAPAROSCOPIC TUBAL DYE STUDY;  Surgeon: Mohamud Osorio MD;  [facility]     LAPAROSCOPY DIAGNOSTIC (GYN)  4/15/2014    Procedure: Operative Laparoscopy, Excision Cauterization Of Endometriosis, Chromopertubation ;  Surgeon:  "Mohamud Osorio MD;  [facility]     Family History   Problem Relation Age of Onset     Lipids Father      Hypertension Father      Cardiovascular Father         A fib     Cancer Maternal Grandmother         ovarian      Diabetes Maternal Grandfather      Heart Disease Maternal Grandfather      Cancer Maternal Grandfather         skin     Cardiovascular Maternal Grandfather         MI     Skin Cancer Maternal Grandfather      Thyroid Disease Brother         ?     Cancer Mother         leukemia     Bleeding Disorder Mother         anemia     Depression Mother      Cardiovascular Paternal Grandfather         MI     Genitourinary Problems Paternal Grandmother         kidney removed     Skin Cancer Maternal Aunt      Social History     Tobacco Use     Smoking status: Never Smoker     Smokeless tobacco: Never Used   Substance Use Topics     Alcohol use: Yes     Comment: occas- quit with pregnancy      History   Drug Use No     Current Outpatient Medications   Medication Sig Dispense Refill     Prenatal Vit-Fe Fumarate-FA (PRENATAL MULTIVITAMIN  PLUS IRON) 27-0.8 MG TABS Take 1 tablet by mouth daily       Allergies   Allergen Reactions     Azithromycin Swelling     Lip swelling     Review of Systems:   10 point ROS otherwise negative    Physical Exam:  /71 (BP Location: Right arm, Patient Position: Sitting, Cuff Size: Adult Regular)   Pulse 68   Resp 14   Ht 1.6 m (5' 3\")   Wt 48.5 kg (107 lb)   LMP 09/17/2019 (Approximate)   BMI 18.95 kg/m       Constitutional- No acute distress, well nourished, non-toxic  Eyes: Anicteric, no injection.  PERRL  ENT:  Normocephalic, atraumatic, Nose midline, moist mucus membranes  Neck - supple, no LAD  Respiratory- Clear to auscultation bilaterally, good inspiratory effort  Cardiovascular - Heart RRR, no lift's, thrills, murmurs, rubs, or gallop.  No peripheral edema.  No clubbing.  Abdomen - Soft, non-tender, +BS, no hepatosplenomegaly, 5mm defect, reducible contents at " umbilicus.  Infraumbilical incision from previous laparoscopy  Neuro - No focal neuro deficits, Alert and oriented x 3  Psych: Appropriate mood and affect  Musculoskeletal: Normal gait, symmetric strength.  FROM upper and lower extremities.  Skin: Warm, Dry    Assessment:  1. Incisional hernia, without obstruction or gangrene      Plan:   María Briones presents with a reducible incisional hernia at her umbilicus. Symptoms are progressively worsening recently. The patient may benefit from hernia repair, and the indications, risks, benefits and alternatives to surgery were discussed in detail, and the patient understood the counseling offered and wishes to proceed as planned and outlined. Risks specifically discussed include bleeding, infection, seroma, need for additional treatment, nontherapeutic intervention, recurrent hernia, potential need for mesh, potential need for temporary surgical drain, wound complication (such as dehiscence), and rare complications related to surgery and/or anesthesia such as venous thromboembolism and cardiorespiratory complications.    We discussed specifically that her hernia will likely recur should she become pregnant.  She states it was quite difficult to become pregnant initially but they are not actively using any contraceptive methods at this time.  Mesh will likely not be used for her repair.    She is otherwise healthy and able to obtain 4 METs of activity.  She can proceed to OR without further testing.    Tani Sinclair DO on 10/10/2019 at 7:21 AM      Again, thank you for allowing me to participate in the care of your patient.        Sincerely,        Tani Sinclair DO

## 2019-10-10 NOTE — PATIENT INSTRUCTIONS
Per physician instructions      If you have questions or concerns on any instructions given to you by your provider today or if you need to schedule an appointment, you can reach us at [phone].

## 2019-10-10 NOTE — NURSING NOTE
"Chief Complaint   Patient presents with     Consult     umbilical hernia       Initial /71 (BP Location: Right arm, Patient Position: Sitting, Cuff Size: Adult Regular)   Pulse 68   Resp 14   Ht 1.6 m (5' 3\")   Wt 48.5 kg (107 lb)   LMP 09/17/2019 (Approximate)   BMI 18.95 kg/m   Estimated body mass index is 18.95 kg/m  as calculated from the following:    Height as of this encounter: 1.6 m (5' 3\").    Weight as of this encounter: 48.5 kg (107 lb).  BP completed using cuff size: regular  Medications and allergies reviewed.      Bri VICTORIA MA    "

## 2019-10-10 NOTE — PROGRESS NOTES
Surgical Consultation/History and Physical  [facility] General Surgery    María is seen in consultation for umbilical hernia, at the request of Physician No Ref-Primary.    Chief Complaint:  Umbilical hernia    History of Present Illness: María Briones is a 39 year old female presents with umbilical hernia.  This has been increasing since child birth.  She has noted a bulge at the site which occasionally becomes painful when it does come out.  She has been able to push this back in every time since that time.  She admits no nausea or vomiting.  No overlying skin changes.     Patient Active Problem List   Diagnosis     Irregular periods     Dysmenorrhea     Female infertility     Other specified hypothyroidism     Pes anserinus tendinitis or bursitis     Past Medical History:   Diagnosis Date     Chickenpox      Palpitations 2008     Urinary tract bacterial infections     as chold     Past Surgical History:   Procedure Laterality Date     C ORAL SURGERY PROCEDURE      wisdom teeth      SECTION N/A 10/12/2015    Procedure:  SECTION;  Surgeon: Jayshree Garcia MD;  Location: [facility]      SECTION N/A 2018    Procedure:  Section;  Surgeon: Jayshree Garcia MD;  Location: [facility]      SECTION  2018     LAPAROSCOPIC ABLATION ENDOMETRIOSIS  4/15/2014    Procedure: LAPAROSCOPIC ABLATION ENDOMETRIOSIS;  Surgeon: Mohamud Osorio MD;  [facility]     LAPAROSCOPIC TUBAL DYE STUDY  4/15/2014    Procedure: LAPAROSCOPIC TUBAL DYE STUDY;  Surgeon: Mohamud Osorio MD;  [facility]     LAPAROSCOPY DIAGNOSTIC (GYN)  4/15/2014    Procedure: Operative Laparoscopy, Excision Cauterization Of Endometriosis, Chromopertubation ;  Surgeon: Mohamud Osorio MD;  [facility]     Family History   Problem Relation Age of Onset     Lipids Father      Hypertension Father      Cardiovascular Father         A fib     Cancer Maternal Grandmother         ovarian      Diabetes  "Maternal Grandfather      Heart Disease Maternal Grandfather      Cancer Maternal Grandfather         skin     Cardiovascular Maternal Grandfather         MI     Skin Cancer Maternal Grandfather      Thyroid Disease Brother         ?     Cancer Mother         leukemia     Bleeding Disorder Mother         anemia     Depression Mother      Cardiovascular Paternal Grandfather         MI     Genitourinary Problems Paternal Grandmother         kidney removed     Skin Cancer Maternal Aunt      Social History     Tobacco Use     Smoking status: Never Smoker     Smokeless tobacco: Never Used   Substance Use Topics     Alcohol use: Yes     Comment: occas- quit with pregnancy      History   Drug Use No     Current Outpatient Medications   Medication Sig Dispense Refill     Prenatal Vit-Fe Fumarate-FA (PRENATAL MULTIVITAMIN  PLUS IRON) 27-0.8 MG TABS Take 1 tablet by mouth daily       Allergies   Allergen Reactions     Azithromycin Swelling     Lip swelling     Review of Systems:   10 point ROS otherwise negative    Physical Exam:  /71 (BP Location: Right arm, Patient Position: Sitting, Cuff Size: Adult Regular)   Pulse 68   Resp 14   Ht 1.6 m (5' 3\")   Wt 48.5 kg (107 lb)   LMP 09/17/2019 (Approximate)   BMI 18.95 kg/m      Constitutional- No acute distress, well nourished, non-toxic  Eyes: Anicteric, no injection.  PERRL  ENT:  Normocephalic, atraumatic, Nose midline, moist mucus membranes  Neck - supple, no LAD  Respiratory- Clear to auscultation bilaterally, good inspiratory effort  Cardiovascular - Heart RRR, no lift's, thrills, murmurs, rubs, or gallop.  No peripheral edema.  No clubbing.  Abdomen - Soft, non-tender, +BS, no hepatosplenomegaly, 5mm defect, reducible contents at umbilicus.  Infraumbilical incision from previous laparoscopy  Neuro - No focal neuro deficits, Alert and oriented x 3  Psych: Appropriate mood and affect  Musculoskeletal: Normal gait, symmetric strength.  FROM upper and lower " extremities.  Skin: Warm, Dry    Assessment:  1. Incisional hernia, without obstruction or gangrene      Plan:   María Briones presents with a reducible incisional hernia at her umbilicus. Symptoms are progressively worsening recently. The patient may benefit from hernia repair, and the indications, risks, benefits and alternatives to surgery were discussed in detail, and the patient understood the counseling offered and wishes to proceed as planned and outlined. Risks specifically discussed include bleeding, infection, seroma, need for additional treatment, nontherapeutic intervention, recurrent hernia, potential need for mesh, potential need for temporary surgical drain, wound complication (such as dehiscence), and rare complications related to surgery and/or anesthesia such as venous thromboembolism and cardiorespiratory complications.    We discussed specifically that her hernia will likely recur should she become pregnant.  She states it was quite difficult to become pregnant initially but they are not actively using any contraceptive methods at this time.  Mesh will likely not be used for her repair.    She is otherwise healthy and able to obtain 4 METs of activity.  She can proceed to OR without further testing.    Tani Sinclair, DO on 10/10/2019 at 7:21 AM

## 2019-10-11 ENCOUNTER — TELEPHONE (OUTPATIENT)
Dept: SURGERY | Facility: CLINIC | Age: 39
End: 2019-10-11

## 2019-10-11 NOTE — TELEPHONE ENCOUNTER
Reason for Call:  Other     Detailed comments: Pt would like to reschedule hernia surgery - would like to reschedule from 10 /28 to 11/18    Phone Number Patient can be reached at: Home number on file [phone] (home)    Best Time: Any    Can we leave a detailed message on this number? YES    Call taken on 10/11/2019 at 3:02 PM by Denise Behrendt

## 2019-10-14 NOTE — TELEPHONE ENCOUNTER
Called and spoke to patient, informed that we can change her surgery date to 11/18/19. Home prep/surgery packet reviewed with patient again. Patient voiced understanding and no questions noted.     Called and spoke to Zayda in [facility], informed of surgery date change. Confirmation received that surgery has been changed to 11/18/19.    Bri VICTORIA MA

## 2019-11-08 ENCOUNTER — HEALTH MAINTENANCE LETTER (OUTPATIENT)
Age: 39
End: 2019-11-08

## 2019-11-10 ENCOUNTER — ANESTHESIA EVENT (OUTPATIENT)
Dept: SURGERY | Facility: CLINIC | Age: 39
End: 2019-11-10
Payer: COMMERCIAL

## 2019-11-18 ENCOUNTER — ANESTHESIA (OUTPATIENT)
Dept: SURGERY | Facility: CLINIC | Age: 39
End: 2019-11-18
Payer: COMMERCIAL

## 2019-11-18 ENCOUNTER — HOSPITAL ENCOUNTER (OUTPATIENT)
Facility: CLINIC | Age: 39
Discharge: HOME | End: 2019-11-18
Attending: SURGERY | Admitting: SURGERY
Payer: COMMERCIAL

## 2019-11-18 VITALS
TEMPERATURE: 99.4 F | OXYGEN SATURATION: 97 % | RESPIRATION RATE: 16 BRPM | DIASTOLIC BLOOD PRESSURE: 83 MMHG | BODY MASS INDEX: 18.96 KG/M2 | SYSTOLIC BLOOD PRESSURE: 118 MMHG | WEIGHT: 107 LBS | HEART RATE: 87 BPM | HEIGHT: 63 IN

## 2019-11-18 DIAGNOSIS — K43.2 INCISIONAL HERNIA, WITHOUT OBSTRUCTION OR GANGRENE: ICD-10-CM

## 2019-11-18 LAB — HCG UR QL: NEGATIVE

## 2019-11-18 PROCEDURE — 25000125 ZZHC RX 250: Performed by: NURSE ANESTHETIST, CERTIFIED REGISTERED

## 2019-11-18 PROCEDURE — 25000125 ZZHC RX 250: Performed by: SURGERY

## 2019-11-18 PROCEDURE — 25800030 ZZH RX IP 258 OP 636: Performed by: SURGERY

## 2019-11-18 PROCEDURE — 36000052 ZZH SURGERY LEVEL 2 EA 15 ADDTL MIN: Performed by: SURGERY

## 2019-11-18 PROCEDURE — 37000008 ZZH ANESTHESIA TECHNICAL FEE, 1ST 30 MIN: Performed by: SURGERY

## 2019-11-18 PROCEDURE — 49560 ZZHC REPAIR INCISIONAL HERNIA,REDUCIBLE: CPT | Mod: AS | Performed by: PHYSICIAN ASSISTANT

## 2019-11-18 PROCEDURE — 37000009 ZZH ANESTHESIA TECHNICAL FEE, EACH ADDTL 15 MIN: Performed by: SURGERY

## 2019-11-18 PROCEDURE — 27110028 ZZH OR GENERAL SUPPLY NON-STERILE: Performed by: SURGERY

## 2019-11-18 PROCEDURE — 88302 TISSUE EXAM BY PATHOLOGIST: CPT | Performed by: SURGERY

## 2019-11-18 PROCEDURE — 36000050 ZZH SURGERY LEVEL 2 1ST 30 MIN: Performed by: SURGERY

## 2019-11-18 PROCEDURE — 49560 ZZHC REPAIR INCISIONAL HERNIA,REDUCIBLE: CPT | Performed by: SURGERY

## 2019-11-18 PROCEDURE — 25000128 H RX IP 250 OP 636: Performed by: SURGERY

## 2019-11-18 PROCEDURE — 25000128 H RX IP 250 OP 636: Performed by: NURSE ANESTHETIST, CERTIFIED REGISTERED

## 2019-11-18 PROCEDURE — 27210794 ZZH OR GENERAL SUPPLY STERILE: Performed by: SURGERY

## 2019-11-18 PROCEDURE — 40000305 ZZH STATISTIC PRE PROC ASSESS I: Performed by: SURGERY

## 2019-11-18 PROCEDURE — 81025 URINE PREGNANCY TEST: CPT | Performed by: SURGERY

## 2019-11-18 PROCEDURE — 71000027 ZZH RECOVERY PHASE 2 EACH 15 MINS: Performed by: SURGERY

## 2019-11-18 PROCEDURE — 88302 TISSUE EXAM BY PATHOLOGIST: CPT | Mod: 26 | Performed by: SURGERY

## 2019-11-18 RX ORDER — GLYCOPYRROLATE 0.2 MG/ML
INJECTION, SOLUTION INTRAMUSCULAR; INTRAVENOUS PRN
Status: DISCONTINUED | OUTPATIENT
Start: 2019-11-18 | End: 2019-11-18

## 2019-11-18 RX ORDER — LIDOCAINE HYDROCHLORIDE 10 MG/ML
INJECTION, SOLUTION INFILTRATION; PERINEURAL PRN
Status: DISCONTINUED | OUTPATIENT
Start: 2019-11-18 | End: 2019-11-18

## 2019-11-18 RX ORDER — CEFAZOLIN SODIUM 1 G/3ML
1 INJECTION, POWDER, FOR SOLUTION INTRAMUSCULAR; INTRAVENOUS SEE ADMIN INSTRUCTIONS
Status: DISCONTINUED | OUTPATIENT
Start: 2019-11-18 | End: 2019-11-18 | Stop reason: HOSPADM

## 2019-11-18 RX ORDER — FENTANYL CITRATE 50 UG/ML
INJECTION, SOLUTION INTRAMUSCULAR; INTRAVENOUS PRN
Status: DISCONTINUED | OUTPATIENT
Start: 2019-11-18 | End: 2019-11-18

## 2019-11-18 RX ORDER — FENTANYL CITRATE 50 UG/ML
25-50 INJECTION, SOLUTION INTRAMUSCULAR; INTRAVENOUS
Status: DISCONTINUED | OUTPATIENT
Start: 2019-11-18 | End: 2019-11-18 | Stop reason: HOSPADM

## 2019-11-18 RX ORDER — IBUPROFEN 600 MG/1
600 TABLET, FILM COATED ORAL
Status: CANCELLED | OUTPATIENT
Start: 2019-11-18

## 2019-11-18 RX ORDER — LIDOCAINE 40 MG/G
CREAM TOPICAL
Status: CANCELLED | OUTPATIENT
Start: 2019-11-18

## 2019-11-18 RX ORDER — ALBUTEROL SULFATE 0.83 MG/ML
2.5 SOLUTION RESPIRATORY (INHALATION) EVERY 4 HOURS PRN
Status: DISCONTINUED | OUTPATIENT
Start: 2019-11-18 | End: 2019-11-18 | Stop reason: HOSPADM

## 2019-11-18 RX ORDER — ONDANSETRON 2 MG/ML
INJECTION INTRAMUSCULAR; INTRAVENOUS PRN
Status: DISCONTINUED | OUTPATIENT
Start: 2019-11-18 | End: 2019-11-18

## 2019-11-18 RX ORDER — METOCLOPRAMIDE HYDROCHLORIDE 5 MG/ML
10 INJECTION INTRAMUSCULAR; INTRAVENOUS EVERY 6 HOURS PRN
Status: DISCONTINUED | OUTPATIENT
Start: 2019-11-18 | End: 2019-11-18 | Stop reason: HOSPADM

## 2019-11-18 RX ORDER — HYDROMORPHONE HYDROCHLORIDE 1 MG/ML
.3-.5 INJECTION, SOLUTION INTRAMUSCULAR; INTRAVENOUS; SUBCUTANEOUS EVERY 10 MIN PRN
Status: DISCONTINUED | OUTPATIENT
Start: 2019-11-18 | End: 2019-11-18 | Stop reason: HOSPADM

## 2019-11-18 RX ORDER — BUPIVACAINE HYDROCHLORIDE 5 MG/ML
INJECTION, SOLUTION PERINEURAL PRN
Status: DISCONTINUED | OUTPATIENT
Start: 2019-11-18 | End: 2019-11-18 | Stop reason: HOSPADM

## 2019-11-18 RX ORDER — LIDOCAINE HYDROCHLORIDE AND EPINEPHRINE 10; 10 MG/ML; UG/ML
INJECTION, SOLUTION INFILTRATION; PERINEURAL PRN
Status: DISCONTINUED | OUTPATIENT
Start: 2019-11-18 | End: 2019-11-18 | Stop reason: HOSPADM

## 2019-11-18 RX ORDER — DIMENHYDRINATE 50 MG/ML
25 INJECTION, SOLUTION INTRAMUSCULAR; INTRAVENOUS
Status: DISCONTINUED | OUTPATIENT
Start: 2019-11-18 | End: 2019-11-18 | Stop reason: HOSPADM

## 2019-11-18 RX ORDER — METOCLOPRAMIDE 10 MG/1
10 TABLET ORAL EVERY 6 HOURS PRN
Status: DISCONTINUED | OUTPATIENT
Start: 2019-11-18 | End: 2019-11-18 | Stop reason: HOSPADM

## 2019-11-18 RX ORDER — CEFAZOLIN SODIUM 2 G/100ML
2 INJECTION, SOLUTION INTRAVENOUS
Status: DISCONTINUED | OUTPATIENT
Start: 2019-11-18 | End: 2019-11-18 | Stop reason: HOSPADM

## 2019-11-18 RX ORDER — HYDROCODONE BITARTRATE AND ACETAMINOPHEN 5; 325 MG/1; MG/1
1 TABLET ORAL
Status: CANCELLED | OUTPATIENT
Start: 2019-11-18

## 2019-11-18 RX ORDER — NALOXONE HYDROCHLORIDE 0.4 MG/ML
.1-.4 INJECTION, SOLUTION INTRAMUSCULAR; INTRAVENOUS; SUBCUTANEOUS
Status: DISCONTINUED | OUTPATIENT
Start: 2019-11-18 | End: 2019-11-18 | Stop reason: HOSPADM

## 2019-11-18 RX ORDER — SODIUM CHLORIDE, SODIUM LACTATE, POTASSIUM CHLORIDE, CALCIUM CHLORIDE 600; 310; 30; 20 MG/100ML; MG/100ML; MG/100ML; MG/100ML
INJECTION, SOLUTION INTRAVENOUS CONTINUOUS
Status: DISCONTINUED | OUTPATIENT
Start: 2019-11-18 | End: 2019-11-18 | Stop reason: HOSPADM

## 2019-11-18 RX ORDER — KETOROLAC TROMETHAMINE 30 MG/ML
INJECTION, SOLUTION INTRAMUSCULAR; INTRAVENOUS PRN
Status: DISCONTINUED | OUTPATIENT
Start: 2019-11-18 | End: 2019-11-18

## 2019-11-18 RX ORDER — ONDANSETRON 2 MG/ML
4 INJECTION INTRAMUSCULAR; INTRAVENOUS EVERY 30 MIN PRN
Status: DISCONTINUED | OUTPATIENT
Start: 2019-11-18 | End: 2019-11-18 | Stop reason: HOSPADM

## 2019-11-18 RX ORDER — PROPOFOL 10 MG/ML
INJECTION, EMULSION INTRAVENOUS CONTINUOUS PRN
Status: DISCONTINUED | OUTPATIENT
Start: 2019-11-18 | End: 2019-11-18

## 2019-11-18 RX ORDER — DEXAMETHASONE SODIUM PHOSPHATE 4 MG/ML
INJECTION, SOLUTION INTRA-ARTICULAR; INTRALESIONAL; INTRAMUSCULAR; INTRAVENOUS; SOFT TISSUE PRN
Status: DISCONTINUED | OUTPATIENT
Start: 2019-11-18 | End: 2019-11-18

## 2019-11-18 RX ORDER — SODIUM CHLORIDE, SODIUM LACTATE, POTASSIUM CHLORIDE, CALCIUM CHLORIDE 600; 310; 30; 20 MG/100ML; MG/100ML; MG/100ML; MG/100ML
INJECTION, SOLUTION INTRAVENOUS CONTINUOUS
Status: CANCELLED | OUTPATIENT
Start: 2019-11-18

## 2019-11-18 RX ORDER — ONDANSETRON 4 MG/1
4 TABLET, ORALLY DISINTEGRATING ORAL EVERY 30 MIN PRN
Status: DISCONTINUED | OUTPATIENT
Start: 2019-11-18 | End: 2019-11-18 | Stop reason: HOSPADM

## 2019-11-18 RX ORDER — MEPERIDINE HYDROCHLORIDE 25 MG/ML
12.5 INJECTION INTRAMUSCULAR; INTRAVENOUS; SUBCUTANEOUS
Status: DISCONTINUED | OUTPATIENT
Start: 2019-11-18 | End: 2019-11-18 | Stop reason: HOSPADM

## 2019-11-18 RX ADMIN — MIDAZOLAM HYDROCHLORIDE 2 MG: 1 INJECTION, SOLUTION INTRAMUSCULAR; INTRAVENOUS at 09:18

## 2019-11-18 RX ADMIN — KETOROLAC TROMETHAMINE 30 MG: 30 INJECTION, SOLUTION INTRAMUSCULAR at 09:47

## 2019-11-18 RX ADMIN — LIDOCAINE HYDROCHLORIDE 0.1 ML: 10 INJECTION, SOLUTION EPIDURAL; INFILTRATION; INTRACAUDAL; PERINEURAL at 08:38

## 2019-11-18 RX ADMIN — GLYCOPYRROLATE 0.1 MG: 0.2 INJECTION, SOLUTION INTRAMUSCULAR; INTRAVENOUS at 09:20

## 2019-11-18 RX ADMIN — DEXAMETHASONE SODIUM PHOSPHATE 4 MG: 4 INJECTION, SOLUTION INTRA-ARTICULAR; INTRALESIONAL; INTRAMUSCULAR; INTRAVENOUS; SOFT TISSUE at 09:22

## 2019-11-18 RX ADMIN — SODIUM CHLORIDE, POTASSIUM CHLORIDE, SODIUM LACTATE AND CALCIUM CHLORIDE: 600; 310; 30; 20 INJECTION, SOLUTION INTRAVENOUS at 08:39

## 2019-11-18 RX ADMIN — FENTANYL CITRATE 100 MCG: 50 INJECTION, SOLUTION INTRAMUSCULAR; INTRAVENOUS at 09:20

## 2019-11-18 RX ADMIN — MIDAZOLAM HYDROCHLORIDE 1 MG: 1 INJECTION, SOLUTION INTRAMUSCULAR; INTRAVENOUS at 09:25

## 2019-11-18 RX ADMIN — MIDAZOLAM HYDROCHLORIDE 2 MG: 1 INJECTION, SOLUTION INTRAMUSCULAR; INTRAVENOUS at 09:23

## 2019-11-18 RX ADMIN — LIDOCAINE HYDROCHLORIDE 100 MG: 10 INJECTION, SOLUTION INFILTRATION; PERINEURAL at 09:20

## 2019-11-18 RX ADMIN — PROPOFOL 75 MCG/KG/MIN: 10 INJECTION, EMULSION INTRAVENOUS at 09:20

## 2019-11-18 RX ADMIN — ONDANSETRON 4 MG: 2 INJECTION INTRAMUSCULAR; INTRAVENOUS at 09:22

## 2019-11-18 RX ADMIN — HYDROMORPHONE HYDROCHLORIDE 1 MG: 1 INJECTION, SOLUTION INTRAMUSCULAR; INTRAVENOUS; SUBCUTANEOUS at 09:33

## 2019-11-18 RX ADMIN — GLYCOPYRROLATE 0.1 MG: 0.2 INJECTION, SOLUTION INTRAMUSCULAR; INTRAVENOUS at 09:18

## 2019-11-18 ASSESSMENT — MIFFLIN-ST. JEOR: SCORE: 1134.48

## 2019-11-18 NOTE — H&P
Update 2019:    No changes from previous history.  Patient has reducible incisional hernia from previous diagnostic laparoscopy.  All questions were answered.  To OR.    CV: RRR  Lungs: CTAB  Abdomen: 5mm defect, protrudes with valsalva, reduces with gentle pressure.    10/10/2019:    Surgical Consultation/History and Physical  [facility]     María is seen in consultation for umbilical hernia, at the request of Physician No Ref-Primary.     Chief Complaint:  Umbilical hernia     History of Present Illness: María Briones is a 39 year old female presents with umbilical hernia.  This has been increasing since child birth.  She has noted a bulge at the site which occasionally becomes painful when it does come out.  She has been able to push this back in every time since that time.  She admits no nausea or vomiting.  No overlying skin changes.          Patient Active Problem List   Diagnosis     Irregular periods     Dysmenorrhea     Female infertility     Other specified hypothyroidism     Pes anserinus tendinitis or bursitis      Past Medical History        Past Medical History:   Diagnosis Date     Chickenpox       Palpitations 2008     Urinary tract bacterial infections       as chold         Past Surgical History         Past Surgical History:   Procedure Laterality Date     C ORAL SURGERY PROCEDURE         wisdom teeth      SECTION N/A 10/12/2015     Procedure:  SECTION;  Surgeon: Jayshree Garcia MD;  Location: [facility]      SECTION N/A 2018     Procedure:  Section;  Surgeon: Jayshree Garcia MD;  Location: [facility]      SECTION   2018     LAPAROSCOPIC ABLATION ENDOMETRIOSIS   4/15/2014     Procedure: LAPAROSCOPIC ABLATION ENDOMETRIOSIS;  Surgeon: Mohamud Osorio MD;  [facility]     LAPAROSCOPIC TUBAL DYE STUDY   4/15/2014     Procedure: LAPAROSCOPIC TUBAL DYE STUDY;  Surgeon: Mohamud Osorio MD;  [facility]     LAPAROSCOPY  "DIAGNOSTIC (GYN)   4/15/2014     Procedure: Operative Laparoscopy, Excision Cauterization Of Endometriosis, Chromopertubation ;  Surgeon: Mohamud Osorio MD;  [facility]         Family History         Family History   Problem Relation Age of Onset     Lipids Father       Hypertension Father       Cardiovascular Father           A fib     Cancer Maternal Grandmother           ovarian      Diabetes Maternal Grandfather       Heart Disease Maternal Grandfather       Cancer Maternal Grandfather           skin     Cardiovascular Maternal Grandfather           MI     Skin Cancer Maternal Grandfather       Thyroid Disease Brother           ?     Cancer Mother           leukemia     Bleeding Disorder Mother           anemia     Depression Mother       Cardiovascular Paternal Grandfather           MI     Genitourinary Problems Paternal Grandmother           kidney removed     Skin Cancer Maternal Aunt           Social History            Tobacco Use     Smoking status: Never Smoker     Smokeless tobacco: Never Used   Substance Use Topics     Alcohol use: Yes       Comment: occas- quit with pregnancy          History   Drug Use No      Active Medications          Current Outpatient Medications   Medication Sig Dispense Refill     Prenatal Vit-Fe Fumarate-FA (PRENATAL MULTIVITAMIN  PLUS IRON) 27-0.8 MG TABS Take 1 tablet by mouth daily                   Allergies   Allergen Reactions     Azithromycin Swelling       Lip swelling      Review of Systems:   10 point ROS otherwise negative     Physical Exam:  /71 (BP Location: Right arm, Patient Position: Sitting, Cuff Size: Adult Regular)   Pulse 68   Resp 14   Ht 1.6 m (5' 3\")   Wt 48.5 kg (107 lb)   LMP 09/17/2019 (Approximate)   BMI 18.95 kg/m       Constitutional- No acute distress, well nourished, non-toxic  Eyes: Anicteric, no injection.  PERRL  ENT:  Normocephalic, atraumatic, Nose midline, moist mucus membranes  Neck - supple, no LAD  Respiratory- Clear to " auscultation bilaterally, good inspiratory effort  Cardiovascular - Heart RRR, no lift's, thrills, murmurs, rubs, or gallop.  No peripheral edema.  No clubbing.  Abdomen - Soft, non-tender, +BS, no hepatosplenomegaly, 5mm defect, reducible contents at umbilicus.  Infraumbilical incision from previous laparoscopy  Neuro - No focal neuro deficits, Alert and oriented x 3  Psych: Appropriate mood and affect  Musculoskeletal: Normal gait, symmetric strength.  FROM upper and lower extremities.  Skin: Warm, Dry     Assessment:  1. Incisional hernia, without obstruction or gangrene       Plan:   María Briones presents with a reducible incisional hernia at her umbilicus. Symptoms are progressively worsening recently. The patient may benefit from hernia repair, and the indications, risks, benefits and alternatives to surgery were discussed in detail, and the patient understood the counseling offered and wishes to proceed as planned and outlined. Risks specifically discussed include bleeding, infection, seroma, need for additional treatment, nontherapeutic intervention, recurrent hernia, potential need for mesh, potential need for temporary surgical drain, wound complication (such as dehiscence), and rare complications related to surgery and/or anesthesia such as venous thromboembolism and cardiorespiratory complications.     We discussed specifically that her hernia will likely recur should she become pregnant.  She states it was quite difficult to become pregnant initially but they are not actively using any contraceptive methods at this time.  Mesh will likely not be used for her repair.     She is otherwise healthy and able to obtain 4 METs of activity.  She can proceed to OR without further testing.    Tani Sinclair, DO on 11/18/2019 at 9:09 AM

## 2019-11-18 NOTE — OP NOTE
Procedure Date: 11/18/19      PREOPERATIVE DIAGNOSIS: Reducible incisional hernia  POSTOPERATIVE DIAGNOSIS: Same     PROCEDURE: Incisional Hernia Repair without Mesh     ATTENDING SURGEON: Tani Sinclair DO    ASSISTANT: Mohamud Rothman PA-C (needed for expertise in  retraction, suction, and assistance in surgery)    ESTIMATED BLOOD LOSS: 3mL     INDICATIONS FOR PROCEDURE: : María Briones presents with a painful incisional hernia that is enlarging and increasingly tender at her previous laparoscopy site. After discussion was held with the patient regarding indications, alternatives, benefits, and risks (including, but not limited to risks of bleeding, infection, nontherapeutic operation, hernia recurrence, potential need for additional treatments/surgery, and complications arising from anesthesia), the patient's questions were addressed and she wishes to proceed with surgery.     PROCEDURE: After informed consent was obtained, the patient was brought to the operating room and placed in the supine position on the Operating Room table. Sequential compression devices were placed. General anesthesia was then administered. The abdomen was then prepped and draped in the typical sterile fashion. A time-out was performed to verify patient and procedure.      An incision was created at the infraumbilical site and carried down to the fascia. The umbilical stalk was encircled and transected.  The fascial defect was carefully dissected free of pre-peritoneal fat, and the defect assessed. Findings on exploration include a 3 mm defect. The fascia was robust and mesh was not deemed necessary due to size. The fascia was then approximated with 0-0 Ethibond sutures. Hemostasis was assured.  The umbilical stalk was re-approximated to the fascia with 3-0 vicryl. Sterile saline irrigant was used, and the solution suctioned free. An instrument count, including laparotomy pads, sponges and needles was performed and found to be correct.        Subcutaneous closure was accomplished in layers using 3-0 Vicryl suture, and skin edges were approximated using 4-0 Monocryl, applied in a continuous subcuticular fashion. The skin was cleansed and dried, before administration sterile glue and a sterile bandage.      The patient tolerated the procedure well, was allowed to recover and was transferred to the recovery room in stable condition..     Tani Sinclair DO on 11/18/2019 at 9:59 AM

## 2019-11-18 NOTE — ANESTHESIA CARE TRANSFER NOTE
Patient: María Briones    Procedure(s):  Incisional hernia repair with possible mesh    Diagnosis: Incisional hernia, without obstruction or gangrene [K43.2]  Diagnosis Additional Information: No value filed.    Anesthesia Type:   No value filed.     Note:  Airway :Room Air  Patient transferred to:Phase II  Handoff Report: Identifed the Patient, Identified the Reponsible Provider, Reviewed the pertinent medical history, Discussed the surgical course, Reviewed Intra-OP anesthesia mangement and issues during anesthesia, Set expectations for post-procedure period and Allowed opportunity for questions and acknowledgement of understanding      Vitals: (Last set prior to Anesthesia Care Transfer)    CRNA VITALS  11/18/2019 0929 - 11/18/2019 0959      11/18/2019             Pulse:  100    SpO2:  99 %    Resp Rate (observed):  (!) 3                Electronically Signed By: RANDELL Dow CRNA  November 18, 2019  9:59 AM

## 2019-11-18 NOTE — ANESTHESIA POSTPROCEDURE EVALUATION
Patient: María Briones    Procedure(s):  Incisional hernia repair with possible mesh    Diagnosis:Incisional hernia, without obstruction or gangrene [K43.2]  Diagnosis Additional Information: No value filed.    Anesthesia Type:  No value filed.    Note:  Anesthesia Post Evaluation    Patient location during evaluation: Phase 2  Patient participation: Able to fully participate in evaluation  Level of consciousness: awake and alert  Pain management: adequate  Airway patency: patent  Cardiovascular status: acceptable and hemodynamically stable  Respiratory status: acceptable, room air and spontaneous ventilation  Hydration status: acceptable  PONV: none     Anesthetic complications: None          Last vitals:  Vitals:    11/18/19 1000 11/18/19 1015 11/18/19 1030   BP: (!) 107/39 113/48 118/83   Pulse:  94 87   Resp: 12 16 16   Temp: 37.4  C (99.4  F)     SpO2: 93% 95% 97%         Electronically Signed By: RANDELL Dow CRNA  November 18, 2019  10:53 AM

## 2019-11-18 NOTE — ANESTHESIA PREPROCEDURE EVALUATION
Anesthesia Pre-Procedure Evaluation    Patient: María Briones   MRN: [id number] : 1980          Preoperative Diagnosis: Incisional hernia, without obstruction or gangrene [K43.2]    Procedure(s):  Incisional hernia repair with possible mesh    Past Medical History:   Diagnosis Date     Chickenpox      Palpitations 2008     Urinary tract bacterial infections     as chold     Past Surgical History:   Procedure Laterality Date     C ORAL SURGERY PROCEDURE      wisdom teeth      SECTION N/A 10/12/2015    Procedure:  SECTION;  Surgeon: Jayshree Garcia MD;  Location: [facility]      SECTION N/A 2018    Procedure:  Section;  Surgeon: Jayshree Garcia MD;  Location: [facility]      SECTION  2018     LAPAROSCOPIC ABLATION ENDOMETRIOSIS  4/15/2014    Procedure: LAPAROSCOPIC ABLATION ENDOMETRIOSIS;  Surgeon: Mohamud Osorio MD;  [facility]     LAPAROSCOPIC TUBAL DYE STUDY  4/15/2014    Procedure: LAPAROSCOPIC TUBAL DYE STUDY;  Surgeon: Mohamud Osorio MD;  [facility]     LAPAROSCOPY DIAGNOSTIC (GYN)  4/15/2014    Procedure: Operative Laparoscopy, Excision Cauterization Of Endometriosis, Chromopertubation ;  Surgeon: Mohamud Osorio MD;  [facility]       Anesthesia Evaluation     . Pt has had prior anesthetic. Type: General, MAC and Regional    No history of anesthetic complications          ROS/MED HX    ENT/Pulmonary:  - neg pulmonary ROS     Neurologic:  - neg neurologic ROS     Cardiovascular:     (+) ----. : . . . :. Irregular Heartbeat/Palpitations, .       METS/Exercise Tolerance:  >4 METS   Hematologic:  - neg hematologic  ROS       Musculoskeletal:  - neg musculoskeletal ROS       GI/Hepatic:     (+) Other GI/Hepatic incisional hernia      Renal/Genitourinary:  - ROS Renal section negative       Endo:         Psychiatric:  - neg psychiatric ROS       Infectious Disease:  - neg infectious disease ROS       Malignancy:      - no malignancy  "  Other:    (+) No chance of pregnancy   - neg other ROS                      Physical Exam  Normal systems: cardiovascular and pulmonary    Airway   Mallampati: I  TM distance: >3 FB  Neck ROM: full    Dental   (+) lower retainer    Cardiovascular       Pulmonary             Lab Results   Component Value Date    WBC 7.9 12/21/2018    HGB 13.6 12/21/2018    HCT 41.9 12/21/2018     12/21/2018    GLC 90 10/24/2016    TSH 0.51 12/21/2018    T4 0.96 12/21/2018    HCG Negative 04/15/2014       Preop Vitals  BP Readings from Last 3 Encounters:   10/10/19 124/71   09/27/19 110/74   02/06/19 92/60    Pulse Readings from Last 3 Encounters:   10/10/19 68   09/27/19 61   02/06/19 95      Resp Readings from Last 3 Encounters:   10/10/19 14   09/27/19 16   02/06/19 18    SpO2 Readings from Last 3 Encounters:   09/27/19 100%   02/06/19 93%   01/31/19 98%      Temp Readings from Last 1 Encounters:   09/27/19 36.6  C (97.8  F) (Tympanic)    Ht Readings from Last 1 Encounters:   10/10/19 1.6 m (5' 3\")      Wt Readings from Last 1 Encounters:   10/10/19 48.5 kg (107 lb)    Estimated body mass index is 18.95 kg/m  as calculated from the following:    Height as of 10/10/19: 1.6 m (5' 3\").    Weight as of 10/10/19: 48.5 kg (107 lb).       Anesthesia Plan      History & Physical Review  History and physical reviewed and following examination; no interval change.    ASA Status:  1 .    NPO Status:  > 6 hours    Plan for MAC Maintenance will be Balanced.  Reason for MAC:  Deep or markedly invasive procedure (G8)  PONV prophylaxis:  Ondansetron (or other 5HT-3) and Dexamethasone or Solumedrol       Postoperative Care  Postoperative pain management:  IV analgesics and Oral pain medications.      Consents  Anesthetic plan, risks, benefits and alternatives discussed with:  Patient and Spouse..                 RANDELL Dow CRNA  "

## 2019-11-18 NOTE — DISCHARGE INSTRUCTIONS
HOME CARE FOLLOWING ABDOMINAL SURGERY    INCISIONAL CARE:  Replace the bandage over your incision (or incisions) until all drainage stops, or if more comfortable to have in place.  If present, leave the steri-strips (white paper tapes) in place for 14 days after surgery.  If you have staples in your incision at the time of discharge, they will be removed at your follow-up appointment.  If Dermabond (a type of skin glue) is present, leave in place until it wears/flakes off.     BATHING:  Avoid baths for 1 week after surgery.  Showers are okay.  You may wash your hair at any time.  Gently pat your incision dry after bathing.    ACTIVITY:  Light Activity -- you may immediately be up and about as tolerated.  Driving -- you may drive when comfortable and off narcotic pain medications (example: Norco, Percocet, Hydrocodone).  Light Work -- resume when comfortable off pain medications.  (If you can drive, you probably can work.)  Strenuous Work/Activity -- limit lifting to 20 pounds for 4 weeks.  Then, progressively increase with time.  Active Sports (running, biking, etc.) -- cautiously resume after 6 weeks.  Most importantly listen to your body.  If an activity causes pain or discomfort please stop and try in a few days or decrease your intensity.    DISCOMFORT:  Use pain medications as prescribed by your surgeon.  Take the pain medication with some food, when possible, to minimize side effects.  Expect gradual improvement.      Narcotic Pain Medications:  Do not drive, make important decisions or operate heavy machinery while on narcotic pain medications.  Narcotic pain medications can be associated with nausea, sleep disturbance, and constipation.  Unless directed otherwise, please take an over the counter stool softener (Colace 100mg twice a day) unless directed otherwise.  As soon as you are able to stop narcotic pain medications the better. Long term use of narcotics is associated with tolerance (the need for more  medication for effect) and potential addiction.    DIET:  Return to diet you were on before surgery, unless you are given specific diet instructions.  Drink plenty of fluids.  While taking pain medications, increase dietary fiber or add a fiber supplementation like Metamucil or Citrucel to help prevent constipation - a possible side effect of pain medications.    NAUSEA:  If nauseated from the anesthetic/pain meds; rest in bed, get up cautiously with assistance, and drink clear liquids (juice, tea, broth).    RETURN APPOINTMENT:  Schedule a follow-up visit 2-3 weeks after discharge from the hospital.  Office Phone: [phone]     CONTACT US IF THE FOLLOWING DEVELOPS:   1. A fever that is above 101     2. If there is a large amount of drainage, bleeding, or swelling.   3. Severe pain that is not relieved by your prescription.   4. Drainage that is thick, cloudy, yellow, green or white.   5. Any other questions not answered by  Frequently Asked Questions  sheet.      FREQUENTLY ASKED QUESTIONS:    Q:  How should my incision look?    A:  Normally your incision will appear slightly swollen with light redness directly along the incision itself as it heals.  It may feel like a bump or ridge as the healing/scarring happens, and over time (3-4 months) this bump or ridge feeling should slowly go away.  In general, clear or pink watery drainage can be normal at first as your incision heals, but should decrease over time.    Q:  How do I know if my incision is infected?  A:  Look at your incision for signs of infection, like redness around the incision spreading to surrounding skin, or drainage of cloudy or foul-smelling drainage.  If you feel warm, check your temperature to see if you are running a fever.    **If any of these things occur, please notify the nurse at our office.  We may need you to come into the office for an incision check.      Q:  How do I take care of my incision?  A:  If you have a dressing in place -  Starting the day after surgery, replace the dressing 1-2 times a day until there is no further drainage from the incision.  At that time, a dressing is no longer needed.  Try to minimize tape on the skin if irritation is occurring at the tape sites.  If you have significant irritation from tape on the skin, please call the office to discuss other method of dressing your incision.    Small pieces of tape called  steri-strips  may be present directly overlying your incision; these may be removed 10 days after surgery unless otherwise specified by your surgeon.  If these tapes start to loosen at the ends, you may trim them back until they fall off or are removed.    A:  If you had  Dermabond  tissue glue used as a dressing (this causes your incision to look shiny with a clear covering over it) - This type of dressing wears off with time and does not require more dressings over the top unless it is draining around the glue as it wears off.  Do not apply ointments or lotions over the incisions until the glue has completely worn off.    Q:  There is a piece of tape or a sticky  lead  still on my skin.  Can I remove this?  A:  Sometimes the sticky  leads  used for monitoring during surgery or for evaluation in the emergency department are not all removed while you are in the hospital.  These sometimes have a tab or metal dot on them.  You can easily remove these on your own, like taking off a band-aid.  If there is a gel substance under the  lead , simply wipe/clean it off with a washcloth or paper towel.      Q:  What can I do to minimize constipation (very hard stools, or lack of stools)?  A:  Stay well hydrated.  Increase your dietary fiber intake or take a fiber supplement -with plenty of water.  Walk around frequently.  You may consider an over-the-counter stool-softener.  Your Pharmacist can assist you with choosing one that is stocked at your pharmacy.  Constipation is also one of the most common side effects of  pain medication.  If you are using pain medication, be pro-active and try to PREVENT problems with constipation by taking the steps above BEFORE constipation becomes a problem.    Q:  What do I do if I need more pain medications?  A:  Call the office to receive refills.  Be aware that certain pain meds cannot be called into a pharmacy and actually require a paper prescription.  A change may be made in your pain med as you progress thru your recovery period or if you have side effects to certain meds.    --Pain meds are NOT refilled after 5pm on weekdays, and NOT AT ALL on the weekends, so please look ahead to prevent problems.      Q:  Why am I having a hard time sleeping now that I am at home?  A:  Many medications you receive while you are in the hospital can impact your sleep for a number of days after your surgery/hospitalization.  Decreased level of activity and naps during the day may also make sleeping at night difficult.  Try to minimize day-time naps, and get up frequently during the day to walk around your home during your recovery time.  Sleep aides may be of some help, but are not recommended for long-term use.      Q:  I am having some back discomfort.  What should I do?  A:  This may be related to certain positioning that was required for your surgery, extended periods of time in bed, or other changes in your overall activity level.  You may try ice, heat, acetaminophen, or ibuprofen to treat this temporarily.  Note that many pain medications have acetaminophen in them and would state this on the prescription bottle.  Be sure not to exceed the maximum of 4000mg per day of acetaminophen.     **If the pain you are having does not resolve, is severe, or is a flare of back pain you have had on other occasions prior to surgery, please contact your primary physician for further recommendations or for an appointment to be examined at their office.    Q:  Why am I having headaches?  A:  Headaches can be caused  by many things:  caffeine withdrawal, use of pain meds, dehydration, high blood pressure, lack of sleep, over-activity/exhaustion, flare-up of usual migraine headaches.  If you feel this is related to muscle tension (a band-like feeling around the head, or a pressure at the low-back of the head) you may try ice or heat to this area.  You may need to drink more fluids (try electrolyte drink like Gatorade), rest, or take your usual migraine medications.   **If your headaches do not resolve, worsen, are accompanied by other symptoms, or if your blood pressure is high, please call your primary physician for recommendation and/or examination.    Q:  I am unable to urinate.  What do I do?  A:  A small percentage of people can have difficulty urinating initially after surgery.  This includes being able to urinate only a very small amount at a time and feeling discomfort or pressure in the very low abdomen.  This is called  urinary retention , and is actually an urgent situation.  Proceed to your nearest Emergency department for evaluation (not an Urgent Care Center).  Sometimes the bladder does not work correctly after certain medications you receive during surgery, or related to certain procedures.  You may need to have a catheter placed until your bladder recovers.  When planning to go to an Emergency department, it may help to call the ER to let them know you are coming in for this problem after a surgery.  This may help you get in quicker to be evaluated.  **If you have symptoms of a urinary tract infection, please contact your primary physician for the proper evaluation and treatment.          If you have other questions, please call the office Monday thru Friday between 8am and 5pm to discuss with the nurse.  # [phone]    There is a surgeon ON CALL on weekday evenings and over the weekend in case of urgent need only, and may be contacted at the same number.    If you are having an emergency, call 911 or proceed  to your nearest emergency department.                            Same Day Surgery Discharge Instructions  Special Precautions After Surgery - Adult    1. It is not unusual to feel lightheaded or faint, up to 24 hours after surgery or while taking pain medication.  If you have these symptoms; sit for a few minutes before standing and have someone assist you when getting up.  2. You should rest and relax for the next 24 hours and must have someone stay with you for at least 24 hours after your discharge.  3. DO NOT DRIVE any vehicle or operate mechanical equipment for 24 hours following the end of your surgery.  DO NOT DRIVE while taking narcotic pain medications that have been prescribed by your physician.  If you had a limb operated on, you must be able to use it fully to drive.  4. DO NOT drink alcoholic beverages for 24 hours following surgery or while taking prescription pain medication.  5. Drink clear liquids (apple juice, ginger ale, broth, 7-Up, etc.).  Progress to your regular diet as you feel able.  6. Any questions call your physician and do not make important decisions for 24 hours.    ACTIVITY  ? No lifting more than 20 pounds for 4 weeks.     INCISIONAL CARE  ? May remove dressing in 24 hours.  ? Keep incision dry for 24 hours.  ? Apply ice 1/2 hour on and 1/2 hour off for first 72 hours.  ? Be alert for signs of infection:  redness, swelling, heat, drainage of pus, and/or elevated temperature.  Contact your doctor if these occur.     ? No soaking of incision for one week.     Call for an appointment to return to the clinic 2-3 weeks.    Medications:  ? Acetaminophen (Tylenol):  Next dose: as needed.  ? Ibuprofen (Motrin, Advil):  Next dose: as needed.  ? Follow the instructions on the bottle.     Additional discharge instructions: as written per MD.  __________________________________________________________________________________________________________________________________  IMPORTANT NUMBERS:     [facility] Main Number:  [phone], [phone]  Pharmacy:  [phone]  Same Day Surgery:  [phone], Monday - Friday until 8:30 p.m.  Urgent Care:  [phone]  Emergency Room:  [phone]      [facility]:  [phone]                                                                             [facility]:  [phone] option 1  [facility]:  [phone]     [facility]:  [phone]   [facility]:  [phone]   Home Medical Equipment: [phone]  [facility]:  [phone]

## 2019-11-20 LAB — COPATH REPORT: NORMAL

## 2019-12-05 ENCOUNTER — OFFICE VISIT (OUTPATIENT)
Dept: SURGERY | Facility: CLINIC | Age: 39
End: 2019-12-05
Payer: COMMERCIAL

## 2019-12-05 VITALS
TEMPERATURE: 98.5 F | SYSTOLIC BLOOD PRESSURE: 130 MMHG | HEART RATE: 77 BPM | DIASTOLIC BLOOD PRESSURE: 69 MMHG | RESPIRATION RATE: 16 BRPM

## 2019-12-05 DIAGNOSIS — Z98.890 S/P HERNIA REPAIR: Primary | ICD-10-CM

## 2019-12-05 DIAGNOSIS — Z87.19 S/P HERNIA REPAIR: Primary | ICD-10-CM

## 2019-12-05 PROCEDURE — 99024 POSTOP FOLLOW-UP VISIT: CPT | Performed by: SURGERY

## 2019-12-05 NOTE — NURSING NOTE
"Chief Complaint   Patient presents with     Surgical Followup     Incisional hernia repair       Initial /69 (BP Location: Right arm, Patient Position: Chair, Cuff Size: Adult Regular)   Pulse 77   Temp 98.5  F (36.9  C) (Tympanic)   Resp 16   LMP 11/18/2019  Estimated body mass index is 18.95 kg/m  as calculated from the following:    Height as of 11/18/19: 1.6 m (5' 3\").    Weight as of 11/18/19: 48.5 kg (107 lb).  BP completed using cuff size: regular   Medications and allergies reviewed.      Farzaneh LINDSEY CMA     "

## 2019-12-05 NOTE — PROGRESS NOTES
General Surgery Post Op    Pt returns for follow up visit s/p incisional hernia repair with mesh on 11/18/2019.    Patient has been doing well, tolerating diet. Bowels moving well. Pain controlled. No issues with wound healing/redness/drainage. No fevers.  She c/o pruritis at incision site.  No rash.    Physical exam: Vitals: /69 (BP Location: Right arm, Patient Position: Chair, Cuff Size: Adult Regular)   Pulse 77   Temp 98.5  F (36.9  C) (Tympanic)   Resp 16   LMP 11/18/2019     Exam:  Constitutional: healthy, alert and no distress  Cardiovascular: negative  Respiratory: negative  Gastrointestinal: Abdomen soft, non-tender. BS normal. No masses, organomegaly  Incision with healing ridge.  Otherwise clean, dry, intact    Path:  Hernia sac:   - Hernia sac with mild chronic inflammation without unexpected anatomic   structures or atypical cellular   proliferations.     Assessment:     ICD-10-CM    1. S/P hernia repair Z98.890     Z87.19      Plan: Mrs. Briones presents after umbilical/incisional hernia repair without mesh.  She is recovering without issue at this time.  We discussed normal wound healing. She can start to increase weight lifting by 5 lbs each week.  No restrictions after 4 weeks.  I advised her to use common sense as she increases activity.    Tani Sinclair, DO on 12/5/2019 at 7:42 AM

## 2019-12-05 NOTE — PATIENT INSTRUCTIONS
Per physician instructions.    If you have questions or concerns on any instructions given to you by your provider today or if you need to schedule an appointment, you can reach us at [phone].    Thank you!

## 2019-12-05 NOTE — LETTER
12/5/2019         RE: María Briones  [id number] [address] [place] [id number]        Dear Colleague,    Thank you for referring your patient, María Briones, to the [facility]. Please see a copy of my visit note below.    General Surgery Post Op    Pt returns for follow up visit s/p incisional hernia repair with mesh on 11/18/2019.    Patient has been doing well, tolerating diet. Bowels moving well. Pain controlled. No issues with wound healing/redness/drainage. No fevers.  She c/o pruritis at incision site.  No rash.    Physical exam: Vitals: /69 (BP Location: Right arm, Patient Position: Chair, Cuff Size: Adult Regular)   Pulse 77   Temp 98.5  F (36.9  C) (Tympanic)   Resp 16   LMP 11/18/2019     Exam:  Constitutional: healthy, alert and no distress  Cardiovascular: negative  Respiratory: negative  Gastrointestinal: Abdomen soft, non-tender. BS normal. No masses, organomegaly  Incision with healing ridge.  Otherwise clean, dry, intact    Path:  Hernia sac:   - Hernia sac with mild chronic inflammation without unexpected anatomic   structures or atypical cellular   proliferations.     Assessment:     ICD-10-CM    1. S/P hernia repair Z98.890     Z87.19      Plan: Mrs. Briones presents after umbilical/incisional hernia repair without mesh.  She is recovering without issue at this time.  We discussed normal wound healing. She can start to increase weight lifting by 5 lbs each week.  No restrictions after 4 weeks.  I advised her to use common sense as she increases activity.    Tani Sinclair DO on 12/5/2019 at 7:42 AM        Again, thank you for allowing me to participate in the care of your patient.        Sincerely,        Tani Sinclair DO

## 2019-12-19 ENCOUNTER — OFFICE VISIT (OUTPATIENT)
Dept: DERMATOLOGY | Facility: CLINIC | Age: 39
End: 2019-12-19
Payer: COMMERCIAL

## 2019-12-19 VITALS — DIASTOLIC BLOOD PRESSURE: 76 MMHG | HEART RATE: 80 BPM | OXYGEN SATURATION: 100 % | SYSTOLIC BLOOD PRESSURE: 117 MMHG

## 2019-12-19 DIAGNOSIS — D22.9 MULTIPLE BENIGN NEVI: ICD-10-CM

## 2019-12-19 DIAGNOSIS — L40.9 PSORIASIS: Primary | ICD-10-CM

## 2019-12-19 DIAGNOSIS — L82.1 SEBORRHEIC KERATOSIS: ICD-10-CM

## 2019-12-19 DIAGNOSIS — D18.01 CHERRY ANGIOMA: ICD-10-CM

## 2019-12-19 DIAGNOSIS — L81.4 LENTIGO: ICD-10-CM

## 2019-12-19 PROCEDURE — 99214 OFFICE O/P EST MOD 30 MIN: CPT | Performed by: PHYSICIAN ASSISTANT

## 2019-12-19 RX ORDER — TRIAMCINOLONE ACETONIDE 1 MG/G
CREAM TOPICAL
Qty: 80 G | Refills: 4 | Status: SHIPPED | OUTPATIENT
Start: 2019-12-19 | End: 2021-10-29

## 2019-12-19 NOTE — PROGRESS NOTES
María Briones is a 39 year old year old female patient here today for rash on elbows and knees. She notes hydrocortisone is improving rash. Her father and brother have psoriasis. She notes rash is itchy and scaly. Patient has no other skin complaints today.  Remainder of the HPI, Meds, PMH, Allergies, FH, and SH was reviewed in chart.    Pertinent Hx:  Psoriasis   Past Medical History:   Diagnosis Date     Chickenpox      Palpitations 2008     Urinary tract bacterial infections     as chold       Past Surgical History:   Procedure Laterality Date     C ORAL SURGERY PROCEDURE      wisdom teeth      SECTION N/A 10/12/2015    Procedure:  SECTION;  Surgeon: Jayshree Garcia MD;  Location: [facility]      SECTION N/A 2018    Procedure:  Section;  Surgeon: Jayshree Garcia MD;  Location: [facility]      SECTION  2018     HERNIORRHAPHY INCISIONAL (LOCATION) N/A 2019    Procedure: Incisional hernia repair;  Surgeon: Tani Sinclair, ;  Location: [facility]     LAPAROSCOPIC ABLATION ENDOMETRIOSIS  4/15/2014    Procedure: LAPAROSCOPIC ABLATION ENDOMETRIOSIS;  Surgeon: Mohamud Osorio MD;  Location: [facility]     LAPAROSCOPIC TUBAL DYE STUDY  4/15/2014    Procedure: LAPAROSCOPIC TUBAL DYE STUDY;  Surgeon: Mohamud Osorio MD;  Location: [facility]     LAPAROSCOPY DIAGNOSTIC (GYN)  4/15/2014    Procedure: Operative Laparoscopy, Excision Cauterization Of Endometriosis, Chromopertubation ;  Surgeon: Mohamud Osorio MD;  Location: [facility]        Family History   Problem Relation Age of Onset     Lipids Father      Hypertension Father      Cardiovascular Father         A fib     Cancer Maternal Grandmother         ovarian      Diabetes Maternal Grandfather      Heart Disease Maternal Grandfather      Cancer Maternal Grandfather         skin     Cardiovascular Maternal Grandfather         MI     Skin Cancer Maternal Grandfather      Thyroid Disease Brother         ?     Cancer  Mother         leukemia     Bleeding Disorder Mother         anemia     Depression Mother      Cardiovascular Paternal Grandfather         MI     Genitourinary Problems Paternal Grandmother         kidney removed     Skin Cancer Maternal Aunt        Social History     Socioeconomic History     Marital status:      Spouse name: Not on file     Number of children: Not on file     Years of education: Not on file     Highest education level: Not on file   Occupational History     Not on file   Social Needs     Financial resource strain: Not on file     Food insecurity:     Worry: Not on file     Inability: Not on file     Transportation needs:     Medical: Not on file     Non-medical: Not on file   Tobacco Use     Smoking status: Never Smoker     Smokeless tobacco: Never Used   Substance and Sexual Activity     Alcohol use: Yes     Comment: occas- quit with pregnancy     Drug use: No     Sexual activity: Yes     Partners: Male     Comment:  since 2005   Lifestyle     Physical activity:     Days per week: Not on file     Minutes per session: Not on file     Stress: Not on file   Relationships     Social connections:     Talks on phone: Not on file     Gets together: Not on file     Attends Confucianism service: Not on file     Active member of club or organization: Not on file     Attends meetings of clubs or organizations: Not on file     Relationship status: Not on file     Intimate partner violence:     Fear of current or ex partner: Not on file     Emotionally abused: Not on file     Physically abused: Not on file     Forced sexual activity: Not on file   Other Topics Concern     Parent/sibling w/ CABG, MI or angioplasty before 65F 55M? No   Social History Narrative     Not on file       Outpatient Encounter Medications as of 12/19/2019   Medication Sig Dispense Refill     Prenatal Vit-Fe Fumarate-FA (PRENATAL MULTIVITAMIN  PLUS IRON) 27-0.8 MG TABS Take 1 tablet by mouth daily       triamcinolone  (KENALOG) 0.1 % external cream Apply twice daily sparingly to affected are on elbow, knees as needed. 80 g 4     No facility-administered encounter medications on file as of 12/19/2019.              Review Of Systems  Skin: As above  Eyes: negative  Ears/Nose/Throat: negative  Respiratory: No shortness of breath, dyspnea on exertion, cough, or hemoptysis  Cardiovascular: negative  Gastrointestinal: negative  Genitourinary: negative  Musculoskeletal: negative  Neurologic: negative  Psychiatric: negative  Hematologic/Lymphatic/Immunologic: negative  Endocrine: negative      O:   NAD, WDWN, Alert & Oriented, Mood & Affect wnl, Vitals stable   Here today alone   /76 (BP Location: Right arm, Patient Position: Sitting, Cuff Size: Adult Regular)   Pulse 80   SpO2 100%    General appearance normal   Vitals stable   Alert, oriented and in no acute distress     Psoriasiform thin plaques on knees   brown macules on trunk and ext   Red papules on trunk  Brown papules and macules with regular pigment network and borders on torso and extremities    The remainder of skin exam is normal    Eyes: Conjunctivae/lids:Normal     ENT: Lips: normal    MSK:Normal    Cardiovascular: peripheral edema none    Pulm: Breathing Normal    Neuro/Psych: Orientation:Alert and Orientedx3 ; Mood/Affect:normal   A/P:  1. Psoriasis   Discussed that this is chronic, can wax and wane. Typically worse in winter. Apply triamcinolone cream twice daily as needed to affected area on elbows and knees. Use moisturizers daily.   2. Seborrheic keratosis, lentigo, angioma, benign nevi   BENIGN LESIONS DISCUSSED WITH PATIENT:  I discussed the specifics of tumor, prognosis, and genetics of benign lesions.  I explained that treatment of these lesions would be purely cosmetic and not medically neccessary.  I discussed with patient different removal options including excision, cautery and /or laser.      Nature and genetics of benign skin lesions dicussed with  patient.  Signs and Symptoms of skin cancer discussed with patient.  ABCDEs of melanoma reviewed with patient.  Patient encouraged to perform monthly skin exams.  UV precautions reviewed with patient.  Risks of non-melanoma skin cancer discussed with patient   Return to clinic in one year or sooner if needed.

## 2019-12-19 NOTE — LETTER
2019         RE: María Briones  [id number] [address] [place] [id number]        Dear Colleague,    Thank you for referring your patient, María Briones, to the [facility]. Please see a copy of my visit note below.    María Briones is a 39 year old year old female patient here today for rash on elbows and knees. She notes hydrocortisone is improving rash. Her father and brother have psoriasis. She notes rash is itchy and scaly. Patient has no other skin complaints today.  Remainder of the HPI, Meds, PMH, Allergies, FH, and SH was reviewed in chart.    Pertinent Hx:  Psoriasis   Past Medical History:   Diagnosis Date     Chickenpox      Palpitations 2008     Urinary tract bacterial infections     as chold       Past Surgical History:   Procedure Laterality Date     C ORAL SURGERY PROCEDURE      wisdom teeth      SECTION N/A 10/12/2015    Procedure:  SECTION;  Surgeon: Jayshree Garcia MD;  Location: [facility]      SECTION N/A 2018    Procedure:  Section;  Surgeon: Jayshree Garcia MD;  Location: [facility]      SECTION  2018     HERNIORRHAPHY INCISIONAL (LOCATION) N/A 2019    Procedure: Incisional hernia repair;  Surgeon: Tani iSnclair DO;  Location: [facility]     LAPAROSCOPIC ABLATION ENDOMETRIOSIS  4/15/2014    Procedure: LAPAROSCOPIC ABLATION ENDOMETRIOSIS;  Surgeon: Mohamud Osorio MD;  [facility]     LAPAROSCOPIC TUBAL DYE STUDY  4/15/2014    Procedure: LAPAROSCOPIC TUBAL DYE STUDY;  Surgeon: Mohamud Osorio MD;  [facility]     LAPAROSCOPY DIAGNOSTIC (GYN)  4/15/2014    Procedure: Operative Laparoscopy, Excision Cauterization Of Endometriosis, Chromopertubation ;  Surgeon: Mohamud Osorio MD;  [facility]        Family History   Problem Relation Age of Onset     Lipids Father      Hypertension Father      Cardiovascular Father         A fib     Cancer Maternal Grandmother         ovarian      Diabetes Maternal  Grandfather      Heart Disease Maternal Grandfather      Cancer Maternal Grandfather         skin     Cardiovascular Maternal Grandfather         MI     Skin Cancer Maternal Grandfather      Thyroid Disease Brother         ?     Cancer Mother         leukemia     Bleeding Disorder Mother         anemia     Depression Mother      Cardiovascular Paternal Grandfather         MI     Genitourinary Problems Paternal Grandmother         kidney removed     Skin Cancer Maternal Aunt        Social History     Socioeconomic History     Marital status:      Spouse name: Not on file     Number of children: Not on file     Years of education: Not on file     Highest education level: Not on file   Occupational History     Not on file   Social Needs     Financial resource strain: Not on file     Food insecurity:     Worry: Not on file     Inability: Not on file     Transportation needs:     Medical: Not on file     Non-medical: Not on file   Tobacco Use     Smoking status: Never Smoker     Smokeless tobacco: Never Used   Substance and Sexual Activity     Alcohol use: Yes     Comment: occas- quit with pregnancy     Drug use: No     Sexual activity: Yes     Partners: Male     Comment:  since 2005   Lifestyle     Physical activity:     Days per week: Not on file     Minutes per session: Not on file     Stress: Not on file   Relationships     Social connections:     Talks on phone: Not on file     Gets together: Not on file     Attends [employer] service: Not on file     Active member of club or organization: Not on file     Attends meetings of clubs or organizations: Not on file     Relationship status: Not on file     Intimate partner violence:     Fear of current or ex partner: Not on file     Emotionally abused: Not on file     Physically abused: Not on file     Forced sexual activity: Not on file   Other Topics Concern     Parent/sibling w/ CABG, MI or angioplasty before 65F 55M? No   Social History Narrative     Not  on file       Outpatient Encounter Medications as of 12/19/2019   Medication Sig Dispense Refill     Prenatal Vit-Fe Fumarate-FA (PRENATAL MULTIVITAMIN  PLUS IRON) 27-0.8 MG TABS Take 1 tablet by mouth daily       triamcinolone (KENALOG) 0.1 % external cream Apply twice daily sparingly to affected are on elbow, knees as needed. 80 g 4     No facility-administered encounter medications on file as of 12/19/2019.              Review Of Systems  Skin: As above  Eyes: negative  Ears/Nose/Throat: negative  Respiratory: No shortness of breath, dyspnea on exertion, cough, or hemoptysis  Cardiovascular: negative  Gastrointestinal: negative  Genitourinary: negative  Musculoskeletal: negative  Neurologic: negative  Psychiatric: negative  Hematologic/Lymphatic/Immunologic: negative  Endocrine: negative      O:   NAD, WDWN, Alert & Oriented, Mood & Affect wnl, Vitals stable   Here today alone   /76 (BP Location: Right arm, Patient Position: Sitting, Cuff Size: Adult Regular)   Pulse 80   SpO2 100%    General appearance normal   Vitals stable   Alert, oriented and in no acute distress     Psoriasiform thin plaques on knees   brown macules on trunk and ext   Red papules on trunk  Brown papules and macules with regular pigment network and borders on torso and extremities    The remainder of skin exam is normal    Eyes: Conjunctivae/lids:Normal     ENT: Lips: normal    MSK:Normal    Cardiovascular: peripheral edema none    Pulm: Breathing Normal    Neuro/Psych: Orientation:Alert and Orientedx3 ; Mood/Affect:normal   A/P:  1. Psoriasis   Discussed that this is chronic, can wax and wane. Typically worse in winter. Apply triamcinolone cream twice daily as needed to affected area on elbows and knees. Use moisturizers daily.   2. Seborrheic keratosis, lentigo, angioma, benign nevi   BENIGN LESIONS DISCUSSED WITH PATIENT:  I discussed the specifics of tumor, prognosis, and genetics of benign lesions.  I explained that treatment of  these lesions would be purely cosmetic and not medically neccessary.  I discussed with patient different removal options including excision, cautery and /or laser.      Nature and genetics of benign skin lesions dicussed with patient.  Signs and Symptoms of skin cancer discussed with patient.  ABCDEs of melanoma reviewed with patient.  Patient encouraged to perform monthly skin exams.  UV precautions reviewed with patient.  Risks of non-melanoma skin cancer discussed with patient   Return to clinic in one year or sooner if needed.     Again, thank you for allowing me to participate in the care of your patient.        Sincerely,        Susan Ballard PA-C

## 2019-12-19 NOTE — NURSING NOTE
"Initial /76 (BP Location: Right arm, Patient Position: Sitting, Cuff Size: Adult Regular)   Pulse 80   SpO2 100%  Estimated body mass index is 18.95 kg/m  as calculated from the following:    Height as of 11/18/19: 1.6 m (5' 3\").    Weight as of 11/18/19: 48.5 kg (107 lb). .      "

## 2020-01-03 ENCOUNTER — OFFICE VISIT (OUTPATIENT)
Dept: OBGYN | Facility: CLINIC | Age: 40
End: 2020-01-03
Payer: COMMERCIAL

## 2020-01-03 VITALS
WEIGHT: 107.8 LBS | HEART RATE: 82 BPM | BODY MASS INDEX: 19.1 KG/M2 | TEMPERATURE: 98 F | DIASTOLIC BLOOD PRESSURE: 69 MMHG | RESPIRATION RATE: 14 BRPM | SYSTOLIC BLOOD PRESSURE: 117 MMHG | HEIGHT: 63 IN

## 2020-01-03 DIAGNOSIS — Z00.00 ROUTINE GENERAL MEDICAL EXAMINATION AT A HEALTH CARE FACILITY: Primary | ICD-10-CM

## 2020-01-03 PROBLEM — K43.2 INCISIONAL HERNIA, WITHOUT OBSTRUCTION OR GANGRENE: Status: ACTIVE | Noted: 2019-10-10

## 2020-01-03 PROBLEM — K43.2 INCISIONAL HERNIA, WITHOUT OBSTRUCTION OR GANGRENE: Status: RESOLVED | Noted: 2019-10-10 | Resolved: 2020-01-03

## 2020-01-03 PROCEDURE — G0145 SCR C/V CYTO,THINLAYER,RESCR: HCPCS | Performed by: OBSTETRICS & GYNECOLOGY

## 2020-01-03 PROCEDURE — 87624 HPV HI-RISK TYP POOLED RSLT: CPT | Performed by: OBSTETRICS & GYNECOLOGY

## 2020-01-03 PROCEDURE — 99395 PREV VISIT EST AGE 18-39: CPT | Performed by: OBSTETRICS & GYNECOLOGY

## 2020-01-03 ASSESSMENT — MIFFLIN-ST. JEOR: SCORE: 1138.11

## 2020-01-03 NOTE — PROGRESS NOTES
SUBJECTIVE:   CC: María Briones is an 39 year old woman who presents for preventive health visit.     Regular menses, no new concerns.  No contraception requested    Healthy Habits:    Do you get at least three servings of calcium containing foods daily (dairy, green leafy vegetables, etc.)? yes    Amount of exercise or daily activities, outside of work: 3-5 day(s) per week    Problems taking medications regularly No    Medication side effects: No    Have you had an eye exam in the past two years? yes    Do you see a dentist twice per year? yes    Do you have sleep apnea, excessive snoring or daytime drowsiness?no      -------------------------------------    Today's PHQ-2 Score:   PHQ-2 ( 1999 [employer]) 1/3/2020 9/27/2019   Q1: Little interest or pleasure in doing things 0 0   Q2: Feeling down, depressed or hopeless 0 0   PHQ-2 Score 0 0       Abuse: Current or Past(Physical, Sexual or Emotional)- No  Do you feel safe in your environment? Yes        Social History     Tobacco Use     Smoking status: Never Smoker     Smokeless tobacco: Never Used   Substance Use Topics     Alcohol use: Yes     Comment: occas     If you drink alcohol do you typically have >3 drinks per day or >7 drinks per week? No                     Reviewed orders with patient.  Reviewed health maintenance and updated orders accordingly - Yes  BP Readings from Last 3 Encounters:   01/03/20 117/69   12/19/19 117/76   12/05/19 130/69    Wt Readings from Last 3 Encounters:   01/03/20 48.9 kg (107 lb 12.8 oz)   11/18/19 48.5 kg (107 lb)   10/10/19 48.5 kg (107 lb)                    Mammogram not appropriate for this patient based on age.    Pertinent mammograms are reviewed under the imaging tab.  History of abnormal Pap smear:   NO - age 30- 65 PAP every 3 years recommended  Last 3 Pap and HPV Results:   PAP / HPV Latest Ref Rng & Units 10/24/2016 10/11/2011 4/9/2010   PAP - NIL NIL NIL   HPV 16 DNA NEG Negative - -   HPV 18 DNA NEG Negative - -  "  OTHER HR HPV NEG Negative - -     PAP / HPV Latest Ref Rng & Units 10/24/2016 10/11/2011 4/9/2010   PAP - NIL NIL NIL   HPV 16 DNA NEG Negative - -   HPV 18 DNA NEG Negative - -   OTHER HR HPV NEG Negative - -     Reviewed and updated as needed this visit by clinical staff  Tobacco  Allergies  Meds  Med Hx  Surg Hx  Fam Hx  Soc Hx        Reviewed and updated as needed this visit by Provider            ROS:  CONSTITUTIONAL: NEGATIVE for fever, chills, change in weight  INTEGUMENTARU/SKIN: NEGATIVE for worrisome rashes, moles or lesions  EYES: NEGATIVE for vision changes or irritation  ENT: NEGATIVE for ear, mouth and throat problems  RESP: NEGATIVE for significant cough or SOB  BREAST: NEGATIVE for masses, tenderness or discharge  CV: NEGATIVE for chest pain, palpitations or peripheral edema  GI: NEGATIVE for nausea, abdominal pain, heartburn, or change in bowel habits  : NEGATIVE for unusual urinary or vaginal symptoms. Periods are regular.  MUSCULOSKELETAL: NEGATIVE for significant arthralgias or myalgia  NEURO: NEGATIVE for weakness, dizziness or paresthesias  PSYCHIATRIC: NEGATIVE for changes in mood or affect    OBJECTIVE:   /69 (BP Location: Left arm, Patient Position: Chair, Cuff Size: Adult Small)   Pulse 82   Temp 98  F (36.7  C) (Tympanic)   Resp 14   Ht 1.6 m (5' 3\")   Wt 48.9 kg (107 lb 12.8 oz)   LMP 12/16/2019   Breastfeeding No   BMI 19.10 kg/m    EXAM:  GENERAL: healthy, alert and no distress  EYES: Eyes grossly normal to inspection  NECK: no adenopathy, no asymmetry, masses, or scars and thyroid normal to palpation  RESP: lungs clear to auscultation - no rales, rhonchi or wheezes  BREAST: normal without masses, tenderness or nipple discharge and no palpable axillary masses or adenopathy  CV: regular rate and rhythm, normal S1 S2, no S3 or S4, no murmur, click or rub, no peripheral edema and peripheral pulses strong  ABDOMEN: soft, nontender, no hepatosplenomegaly, no masses " "and bowel sounds normal   (female): normal female external genitalia, normal urethral meatus , vaginal mucosa pink, moist, well rugated and normal cervix, adnexae, and retroverted uterus without masses.  MS: no gross musculoskeletal defects noted, no edema  SKIN: no suspicious lesions or rashes  NEURO: Normal strength and tone, mentation intact and speech normal  PSYCH: mentation appears normal, affect normal/bright    Diagnostic Test Results:  none    ASSESSMENT/PLAN:   1. Routine general medical examination at a health care facility  Routine health maintenance reviewed.   - Pap imaged thin layer screen with HPV - recommended age 30 - 65 years (select HPV order below)  - HPV High Risk Types DNA Cervical    COUNSELING:   Reviewed preventive health counseling, as reflected in patient instructions  Special attention given to:        Regular exercise       Healthy diet/nutrition       Vision screening       Alcohol Use       Contraception       Family planning       Folic Acid Counseling       Osteoporosis Prevention/Bone Health       Safe sex practices/STD prevention    Estimated body mass index is 19.1 kg/m  as calculated from the following:    Height as of this encounter: 1.6 m (5' 3\").    Weight as of this encounter: 48.9 kg (107 lb 12.8 oz).         reports that she has never smoked. She has never used smokeless tobacco.      Counseling Resources:  ATP IV Guidelines  Pooled Cohorts Equation Calculator  Breast Cancer Risk Calculator  FRAX Risk Assessment  ICSI Preventive Guidelines  Dietary Guidelines for Americans, 2010  USDA's MyPlate  ASA Prophylaxis  Lung CA Screening    Jayshree Garcia MD  [facility]  "

## 2020-01-03 NOTE — NURSING NOTE
"Initial /69 (BP Location: Left arm, Patient Position: Chair, Cuff Size: Adult Small)   Pulse 82   Temp 98  F (36.7  C) (Tympanic)   Resp 14   Ht 1.6 m (5' 3\")   Wt 48.9 kg (107 lb 12.8 oz)   LMP 12/16/2019   Breastfeeding No   BMI 19.10 kg/m   Estimated body mass index is 19.1 kg/m  as calculated from the following:    Height as of this encounter: 1.6 m (5' 3\").    Weight as of this encounter: 48.9 kg (107 lb 12.8 oz). .    Bri Camejo, ALTAGRACIA    "

## 2020-01-07 LAB
COPATH REPORT: NORMAL
PAP: NORMAL

## 2020-01-09 LAB
FINAL DIAGNOSIS: NORMAL
HPV HR 12 DNA CVX QL NAA+PROBE: NEGATIVE
HPV16 DNA SPEC QL NAA+PROBE: NEGATIVE
HPV18 DNA SPEC QL NAA+PROBE: NEGATIVE
SPECIMEN DESCRIPTION: NORMAL
SPECIMEN SOURCE CVX/VAG CYTO: NORMAL

## 2020-01-28 DIAGNOSIS — E03.8 OTHER SPECIFIED HYPOTHYROIDISM: Primary | ICD-10-CM

## 2020-01-29 DIAGNOSIS — E03.8 OTHER SPECIFIED HYPOTHYROIDISM: ICD-10-CM

## 2020-01-29 LAB
T4 FREE SERPL-MCNC: 1.04 NG/DL (ref 0.76–1.46)
TSH SERPL DL<=0.005 MIU/L-ACNC: 0.86 MU/L (ref 0.4–4)

## 2020-01-29 PROCEDURE — 84439 ASSAY OF FREE THYROXINE: CPT | Performed by: OBSTETRICS & GYNECOLOGY

## 2020-01-29 PROCEDURE — 36415 COLL VENOUS BLD VENIPUNCTURE: CPT | Performed by: OBSTETRICS & GYNECOLOGY

## 2020-01-29 PROCEDURE — 84443 ASSAY THYROID STIM HORMONE: CPT | Performed by: OBSTETRICS & GYNECOLOGY

## 2020-02-20 ENCOUNTER — PRENATAL OFFICE VISIT (OUTPATIENT)
Dept: OBGYN | Facility: CLINIC | Age: 40
End: 2020-02-20
Payer: COMMERCIAL

## 2020-02-20 VITALS
SYSTOLIC BLOOD PRESSURE: 120 MMHG | DIASTOLIC BLOOD PRESSURE: 69 MMHG | HEIGHT: 63 IN | BODY MASS INDEX: 18.85 KG/M2 | TEMPERATURE: 97.3 F | WEIGHT: 106.4 LBS | HEART RATE: 85 BPM | RESPIRATION RATE: 14 BRPM

## 2020-02-20 DIAGNOSIS — Z34.81 PRENATAL CARE, SUBSEQUENT PREGNANCY IN FIRST TRIMESTER: Primary | ICD-10-CM

## 2020-02-20 PROBLEM — Z34.80 PRENATAL CARE OF MULTIGRAVIDA, ANTEPARTUM: Status: ACTIVE | Noted: 2020-02-20

## 2020-02-20 LAB
ABO + RH BLD: NORMAL
ABO + RH BLD: NORMAL
BLD GP AB SCN SERPL QL: NORMAL
BLOOD BANK CMNT PATIENT-IMP: NORMAL
ERYTHROCYTE [DISTWIDTH] IN BLOOD BY AUTOMATED COUNT: 12.7 % (ref 10–15)
HBV SURFACE AG SERPL QL IA: NONREACTIVE
HCT VFR BLD AUTO: 37.5 % (ref 35–47)
HGB BLD-MCNC: 12.8 G/DL (ref 11.7–15.7)
HIV 1+2 AB+HIV1 P24 AG SERPL QL IA: NONREACTIVE
MCH RBC QN AUTO: 28.4 PG (ref 26.5–33)
MCHC RBC AUTO-ENTMCNC: 34.1 G/DL (ref 31.5–36.5)
MCV RBC AUTO: 83 FL (ref 78–100)
PLATELET # BLD AUTO: 283 10E9/L (ref 150–450)
RBC # BLD AUTO: 4.51 10E12/L (ref 3.8–5.2)
SPECIMEN EXP DATE BLD: NORMAL
WBC # BLD AUTO: 6.6 10E9/L (ref 4–11)

## 2020-02-20 PROCEDURE — 87340 HEPATITIS B SURFACE AG IA: CPT | Performed by: OBSTETRICS & GYNECOLOGY

## 2020-02-20 PROCEDURE — 36415 COLL VENOUS BLD VENIPUNCTURE: CPT | Performed by: OBSTETRICS & GYNECOLOGY

## 2020-02-20 PROCEDURE — 87088 URINE BACTERIA CULTURE: CPT | Performed by: OBSTETRICS & GYNECOLOGY

## 2020-02-20 PROCEDURE — 86762 RUBELLA ANTIBODY: CPT | Performed by: OBSTETRICS & GYNECOLOGY

## 2020-02-20 PROCEDURE — 87591 N.GONORRHOEAE DNA AMP PROB: CPT | Performed by: OBSTETRICS & GYNECOLOGY

## 2020-02-20 PROCEDURE — 87086 URINE CULTURE/COLONY COUNT: CPT | Performed by: OBSTETRICS & GYNECOLOGY

## 2020-02-20 PROCEDURE — 99207 ZZC FIRST OB VISIT: CPT | Performed by: OBSTETRICS & GYNECOLOGY

## 2020-02-20 PROCEDURE — 87186 SC STD MICRODIL/AGAR DIL: CPT | Performed by: OBSTETRICS & GYNECOLOGY

## 2020-02-20 PROCEDURE — 86780 TREPONEMA PALLIDUM: CPT | Performed by: OBSTETRICS & GYNECOLOGY

## 2020-02-20 PROCEDURE — 86850 RBC ANTIBODY SCREEN: CPT | Performed by: OBSTETRICS & GYNECOLOGY

## 2020-02-20 PROCEDURE — 81003 URINALYSIS AUTO W/O SCOPE: CPT | Performed by: OBSTETRICS & GYNECOLOGY

## 2020-02-20 PROCEDURE — 87389 HIV-1 AG W/HIV-1&-2 AB AG IA: CPT | Performed by: OBSTETRICS & GYNECOLOGY

## 2020-02-20 PROCEDURE — 86901 BLOOD TYPING SEROLOGIC RH(D): CPT | Performed by: OBSTETRICS & GYNECOLOGY

## 2020-02-20 PROCEDURE — 86900 BLOOD TYPING SEROLOGIC ABO: CPT | Performed by: OBSTETRICS & GYNECOLOGY

## 2020-02-20 PROCEDURE — 85027 COMPLETE CBC AUTOMATED: CPT | Performed by: OBSTETRICS & GYNECOLOGY

## 2020-02-20 PROCEDURE — 87491 CHLMYD TRACH DNA AMP PROBE: CPT | Performed by: OBSTETRICS & GYNECOLOGY

## 2020-02-20 PROCEDURE — 76817 TRANSVAGINAL US OBSTETRIC: CPT | Performed by: OBSTETRICS & GYNECOLOGY

## 2020-02-20 ASSESSMENT — MIFFLIN-ST. JEOR: SCORE: 1131.76

## 2020-02-20 NOTE — NURSING NOTE
"Initial /69 (BP Location: Left arm, Patient Position: Chair, Cuff Size: Adult Regular)   Pulse 85   Temp 97.3  F (36.3  C) (Tympanic)   Resp 14   Ht 1.6 m (5' 3\")   Wt 48.3 kg (106 lb 6.4 oz)   LMP 12/16/2019   Breastfeeding No   BMI 18.85 kg/m   Estimated body mass index is 18.85 kg/m  as calculated from the following:    Height as of this encounter: 1.6 m (5' 3\").    Weight as of this encounter: 48.3 kg (106 lb 6.4 oz). .    Bri Camejo, ALTAGRACIA    "

## 2020-02-20 NOTE — PROGRESS NOTES
María is a 39 year old  @ 9.3 weeks here for new ob visit.  Unplanned but very welcome.       ROS: Ten point review of systems was reviewed and negative except the above.  Current Issues include: fatigue    OBhx: C-sec x 2  Gyne: Pap smears Normal  history of STD No STD history  Past Medical History:   Diagnosis Date     Chickenpox      Incisional hernia, without obstruction or gangrene 10/10/2019     Palpitations 2008     Urinary tract bacterial infections     as chold     Past Surgical History:   Procedure Laterality Date     C ORAL SURGERY PROCEDURE      wisdom teeth      SECTION N/A 10/12/2015    Procedure:  SECTION;  Surgeon: Jayshree Garcia MD;  Location: [facility]      SECTION N/A 2018    Procedure:  Section;  Surgeon: Jayshree Garcia MD;  Location: [facility]     HERNIORRHAPHY INCISIONAL (LOCATION) N/A 2019    Procedure: Incisional hernia repair;  Surgeon: Tani Sinclair DO;  Location: [facility]     LAPAROSCOPIC ABLATION ENDOMETRIOSIS  4/15/2014    Procedure: LAPAROSCOPIC ABLATION ENDOMETRIOSIS;  Surgeon: Mohamud Osorio MD;  [facility]     LAPAROSCOPIC TUBAL DYE STUDY  4/15/2014    Procedure: LAPAROSCOPIC TUBAL DYE STUDY;  Surgeon: Mohamud Osorio MD;  [facility]     LAPAROSCOPY DIAGNOSTIC (GYN)  4/15/2014    Procedure: Operative Laparoscopy, Excision Cauterization Of Endometriosis, Chromopertubation ;  Surgeon: Mohamud Osorio MD;  [facility]     Patient Active Problem List    Diagnosis Date Noted     Other specified hypothyroidism 2018     Priority: Medium     Female infertility 2014     Priority: Medium     History of Stage III endometriosis by laparoscopy on 4/15/14    Prior Successful IVF       Dysmenorrhea 2014     Priority: Medium     Pes anserinus tendinitis or bursitis 10/24/2012     Priority: Medium        Allergies   Allergen Reactions     Azithromycin Swelling     Lip swelling     Prenatal Vit-Fe  "Fumarate-FA (PRENATAL MULTIVITAMIN  PLUS IRON) 27-0.8 MG TABS, Take 1 tablet by mouth daily  triamcinolone (KENALOG) 0.1 % external cream, Apply twice daily sparingly to affected are on elbow, knees as needed. (Patient not taking: Reported on 1/3/2020)    No current facility-administered medications on file prior to visit.     FH: Her family history was reviewed and documented in its appropriate chart area.    Past Medical History of Father of Baby:   No significant medical history    Physical Exam: /69 (BP Location: Left arm, Patient Position: Chair, Cuff Size: Adult Regular)   Pulse 85   Temp 97.3  F (36.3  C) (Tympanic)   Resp 14   Ht 1.6 m (5' 3\")   Wt 48.3 kg (106 lb 6.4 oz)   LMP 2019   Breastfeeding No   BMI 18.85 kg/m    General: Well developed, well nourished female and Thin  Skin: Normal  HEENT: Normal  Neck: Supple,no adenopathy,thyroid normal  Chest: Clear  Heart: Regular rate, rhythm,No murmur, rub, gallop  Breasts: Not examined   Abdomen: Benign,Soft, flat, non-tender,No masses, organomegaly,No inguinal nodes,Bowel sounds normoactive   Extremities: Normal  Neurological: Normal   Perineum: Normal   Vulva: Normal    Transvaginal ultrasound was performed.  A viable intrauterine pregnancy was seen.  CRL consistent with 9 weeks, 3 days.  Fetal heart motion was visualized.    EDC by LMP: 2020   EDC by sono:  2020  Final EDC: 2020    A/P 39 year old  at  9.3 weeks    1. Discussed physician coverage, tertiary support, diet, exercise, weight gain, schedule of visits, routine and indicated ultrasounds, and childbirth education.    2. Options for  testing for chromosomal and birth defects were discussed with the patient.  Diagnostic tests include Chorionic Villus Sampling and Amniocentesis.  We discussed that these tests are definitive but invasive.     Screening tests include nuchal lucency/blood marker testing in the first trimester and quad screening and/or " Level 2 ultrasound in the second trimester.  We discussed that these are screening tests and not diagnostic tests and that false positives and negatives are a distinct possibility.  We discussed that given her advanced maternal age risk of aneuploidy that the patient has the option of diagnostic testing over screening.      Also discussed the option of Verifi, which is less invasive, more accurate, but not always covered by insurance.  The patient opted for Verifi.    3. Prenatal labs, pap, GC, Chlamydia    4. Prenatal Vitamins    5. Prior : plan repeat    Jayshree Garcia M.D.

## 2020-02-21 DIAGNOSIS — N39.0 URINARY TRACT INFECTION: Primary | ICD-10-CM

## 2020-02-21 LAB
C TRACH DNA SPEC QL NAA+PROBE: NEGATIVE
N GONORRHOEA DNA SPEC QL NAA+PROBE: NEGATIVE
RUBV IGG SERPL IA-ACNC: 73 IU/ML
SPECIMEN SOURCE: NORMAL
SPECIMEN SOURCE: NORMAL
T PALLIDUM AB SER QL: NONREACTIVE

## 2020-02-21 RX ORDER — NITROFURANTOIN 25; 75 MG/1; MG/1
100 CAPSULE ORAL 2 TIMES DAILY
Qty: 14 CAPSULE | Refills: 0 | Status: SHIPPED | OUTPATIENT
Start: 2020-02-21 | End: 2020-03-05

## 2020-02-23 LAB
BACTERIA SPEC CULT: ABNORMAL
BACTERIA SPEC CULT: ABNORMAL
Lab: ABNORMAL
SPECIMEN SOURCE: ABNORMAL

## 2020-03-05 ENCOUNTER — MYC MEDICAL ADVICE (OUTPATIENT)
Dept: OBGYN | Facility: CLINIC | Age: 40
End: 2020-03-05

## 2020-03-05 ENCOUNTER — PRENATAL OFFICE VISIT (OUTPATIENT)
Dept: OBGYN | Facility: CLINIC | Age: 40
End: 2020-03-05
Payer: COMMERCIAL

## 2020-03-05 VITALS
DIASTOLIC BLOOD PRESSURE: 57 MMHG | SYSTOLIC BLOOD PRESSURE: 115 MMHG | TEMPERATURE: 96.6 F | HEART RATE: 81 BPM | WEIGHT: 108 LBS | BODY MASS INDEX: 19.13 KG/M2

## 2020-03-05 DIAGNOSIS — R30.0 DYSURIA: Primary | ICD-10-CM

## 2020-03-05 LAB
ALBUMIN UR-MCNC: NEGATIVE MG/DL
AMORPH CRY #/AREA URNS HPF: ABNORMAL /HPF
APPEARANCE UR: ABNORMAL
BACTERIA #/AREA URNS HPF: ABNORMAL /HPF
BILIRUB UR QL STRIP: NEGATIVE
COLOR UR AUTO: YELLOW
GLUCOSE UR STRIP-MCNC: NEGATIVE MG/DL
HGB UR QL STRIP: NEGATIVE
KETONES UR STRIP-MCNC: NEGATIVE MG/DL
LEUKOCYTE ESTERASE UR QL STRIP: ABNORMAL
MUCOUS THREADS #/AREA URNS LPF: PRESENT /LPF
NITRATE UR QL: NEGATIVE
NON-SQ EPI CELLS #/AREA URNS LPF: ABNORMAL /LPF
PH UR STRIP: 7 PH (ref 5–7)
RBC #/AREA URNS AUTO: ABNORMAL /HPF
SOURCE: ABNORMAL
SP GR UR STRIP: 1.01 (ref 1–1.03)
UROBILINOGEN UR STRIP-ACNC: 0.2 EU/DL (ref 0.2–1)
WBC #/AREA URNS AUTO: ABNORMAL /HPF

## 2020-03-05 PROCEDURE — 99213 OFFICE O/P EST LOW 20 MIN: CPT | Performed by: OBSTETRICS & GYNECOLOGY

## 2020-03-05 PROCEDURE — 81001 URINALYSIS AUTO W/SCOPE: CPT | Performed by: OBSTETRICS & GYNECOLOGY

## 2020-03-05 PROCEDURE — 87088 URINE BACTERIA CULTURE: CPT | Performed by: OBSTETRICS & GYNECOLOGY

## 2020-03-05 PROCEDURE — 87186 SC STD MICRODIL/AGAR DIL: CPT | Performed by: OBSTETRICS & GYNECOLOGY

## 2020-03-05 PROCEDURE — 87086 URINE CULTURE/COLONY COUNT: CPT | Performed by: OBSTETRICS & GYNECOLOGY

## 2020-03-05 NOTE — PROGRESS NOTES
"[facility]   OB/GYN Clinic    CC: concern for UTI    Subjective:    María is a 39 year old  at 11w3d who presents for dysuria. Mild nausea, no fevers. New back pain other than some MSK discomfort. Had symptoms yesterday of frequency and dysuria, improved today. Notes was treated for +UC with new ob visit. Otherwise feeling well. Denies uterine cramping, vaginal bleeding or leaking.    Objective:  /57 (BP Location: Left arm, Patient Position: Chair, Cuff Size: Adult Regular)   Pulse 81   Temp 96.6  F (35.9  C) (Tympanic)   Wt 49 kg (108 lb)   LMP 2019   Breastfeeding No   BMI 19.13 kg/m      Estimated body mass index is 19.13 kg/m  as calculated from the following:    Height as of 20: 1.6 m (5' 3\").    Weight as of this encounter: 49 kg (108 lb).    Physical Exam:  Gen: Pleasant, talkative female in no apparent distress   Respiratory: breathing comfortably on room air   Cardiac: Warm and well-perfused.   GI: Abd soft and non-tender, gravid  MSK: Grossly normal movement of all four extremities  Psych: mood and affect bright   Lower extremity: edema not present     Fetal dop tones: 160s bpm    Assessment/Plan:   39 year old  at 11w3d who presents for dysuria. UA at time of visit is not consistent with UTI and patient's symptoms are improved today compared with yesterday. Will plan to await UC.     1) New OB lab; T&S, CBC, HIV, RPR, HepBsAg, Hep B antibody, rubella, GC/Chlam WNL. Plan for 3rd tri lab at 28wks.   2) Genetics testing: planning on NIPT  3) plan for level II given AMA  4) Delivery plan: hx of CS x2, plans repeat  5) Immunizations: flu shot completed, Tdap at 28wks    Return for regular prenatal care as already scheduled.    Paige Heart MD   3/5/2020 11:01 AM   "

## 2020-03-05 NOTE — TELEPHONE ENCOUNTER
To provider to review and advise if topical Kenalog ok to use during pregnancy.    Gloria Desai  [facility] RN

## 2020-03-05 NOTE — NURSING NOTE
"Initial /57 (BP Location: Left arm, Patient Position: Chair, Cuff Size: Adult Regular)   Pulse 81   Temp 96.6  F (35.9  C) (Tympanic)   Wt 49 kg (108 lb)   LMP 12/16/2019   Breastfeeding No   BMI 19.13 kg/m   Estimated body mass index is 19.13 kg/m  as calculated from the following:    Height as of 2/20/20: 1.6 m (5' 3\").    Weight as of this encounter: 49 kg (108 lb). .    Zuleima Stafford MA    "

## 2020-03-06 DIAGNOSIS — N39.0 URINARY TRACT INFECTION WITHOUT HEMATURIA, SITE UNSPECIFIED: Primary | ICD-10-CM

## 2020-03-06 LAB
BACTERIA SPEC CULT: ABNORMAL
Lab: ABNORMAL
SPECIMEN SOURCE: ABNORMAL

## 2020-03-06 RX ORDER — NITROFURANTOIN 25; 75 MG/1; MG/1
100 CAPSULE ORAL 2 TIMES DAILY
Qty: 14 CAPSULE | Refills: 0 | Status: SHIPPED | OUTPATIENT
Start: 2020-03-06 | End: 2020-03-24

## 2020-03-08 DIAGNOSIS — N39.0 URINARY TRACT INFECTION WITHOUT HEMATURIA, SITE UNSPECIFIED: Primary | ICD-10-CM

## 2020-03-08 RX ORDER — GRANULES FOR ORAL 3 G/1
3 POWDER ORAL ONCE
Qty: 1 PACKET | Refills: 0 | Status: SHIPPED | OUTPATIENT
Start: 2020-03-08 | End: 2020-03-24

## 2020-03-18 ENCOUNTER — TELEPHONE (OUTPATIENT)
Dept: OBGYN | Facility: CLINIC | Age: 40
End: 2020-03-18

## 2020-03-18 DIAGNOSIS — Z34.81 PRENATAL CARE, SUBSEQUENT PREGNANCY IN FIRST TRIMESTER: Primary | ICD-10-CM

## 2020-03-20 DIAGNOSIS — Z34.81 PRENATAL CARE, SUBSEQUENT PREGNANCY IN FIRST TRIMESTER: ICD-10-CM

## 2020-03-20 LAB
ALBUMIN UR-MCNC: NEGATIVE MG/DL
APPEARANCE UR: ABNORMAL
BACTERIA #/AREA URNS HPF: ABNORMAL /HPF
BILIRUB UR QL STRIP: NEGATIVE
COLOR UR AUTO: YELLOW
GLUCOSE UR STRIP-MCNC: NEGATIVE MG/DL
HGB UR QL STRIP: ABNORMAL
KETONES UR STRIP-MCNC: NEGATIVE MG/DL
LEUKOCYTE ESTERASE UR QL STRIP: NEGATIVE
NITRATE UR QL: NEGATIVE
NON-SQ EPI CELLS #/AREA URNS LPF: ABNORMAL /LPF
PH UR STRIP: 7.5 PH (ref 5–7)
RBC #/AREA URNS AUTO: ABNORMAL /HPF
SOURCE: ABNORMAL
SP GR UR STRIP: 1.01 (ref 1–1.03)
UROBILINOGEN UR STRIP-ACNC: 0.2 EU/DL (ref 0.2–1)
WBC #/AREA URNS AUTO: ABNORMAL /HPF

## 2020-03-20 PROCEDURE — 87086 URINE CULTURE/COLONY COUNT: CPT | Performed by: OBSTETRICS & GYNECOLOGY

## 2020-03-20 PROCEDURE — 81001 URINALYSIS AUTO W/SCOPE: CPT | Performed by: OBSTETRICS & GYNECOLOGY

## 2020-03-21 LAB
BACTERIA SPEC CULT: NO GROWTH
Lab: NORMAL
SPECIMEN SOURCE: NORMAL

## 2020-03-24 ENCOUNTER — VIRTUAL VISIT (OUTPATIENT)
Dept: OBGYN | Facility: CLINIC | Age: 40
End: 2020-03-24
Payer: COMMERCIAL

## 2020-03-24 ENCOUNTER — AMBULATORY - HEALTHEAST (OUTPATIENT)
Dept: MATERNAL FETAL MEDICINE | Facility: HOSPITAL | Age: 40
End: 2020-03-24

## 2020-03-24 DIAGNOSIS — Z34.80 PRENATAL CARE OF MULTIGRAVIDA, ANTEPARTUM: Primary | ICD-10-CM

## 2020-03-24 DIAGNOSIS — O26.90 PREGNANCY, ANTEPARTUM, COMPLICATIONS: ICD-10-CM

## 2020-03-24 PROCEDURE — 99207 ZZC PRENATAL VISIT: CPT | Performed by: OBSTETRICS & GYNECOLOGY

## 2020-03-24 NOTE — PROGRESS NOTES
"María Briones is a 39 year old female who is being evaluated via a billable telephone visit.      The patient has been notified of following:     \"This telephone visit will be conducted via a call between you and your physician/provider. We have found that certain health care needs can be provided without the need for a physical exam.  This service lets us provide the care you need with a short phone conversation.  If a prescription is necessary we can send it directly to your pharmacy.  If lab work is needed we can place an order for that and you can then stop by our lab to have the test done at a later time.    If during the course of the call the physician/provider feels a telephone visit is not appropriate, you will not be charged for this service.\"     María Briones complains of    Chief Complaint   Patient presents with     Prenatal Care       I have reviewed and updated the patient's Past Medical History, Social History, Family History and Medication List.    ALLERGIES  Azithromycin    Bri Camejo CMA    Additional provider notes:     CC: routine prenatal visit @ 14w1d   HPI: no cramping or bleeding.  No complaints.     A/P  @ 14w1d normal pregnancy    Jayshree Garcia M.D.      Assessment/Plan:  1. Prenatal care of multigravida, antepartum  Plan NIPT--will place future order and have patient come sign forms.  Will order level 2 for future as well.     Phone call duration: 10 minutes    Jayshree Garcia MD    "

## 2020-03-27 ENCOUNTER — TRANSCRIBE ORDERS (OUTPATIENT)
Dept: MATERNAL FETAL MEDICINE | Facility: CLINIC | Age: 40
End: 2020-03-27

## 2020-03-27 DIAGNOSIS — O26.90 PREGNANCY RELATED CONDITION, ANTEPARTUM: Primary | ICD-10-CM

## 2020-04-23 ENCOUNTER — PRENATAL OFFICE VISIT (OUTPATIENT)
Dept: OBGYN | Facility: CLINIC | Age: 40
End: 2020-04-23
Payer: COMMERCIAL

## 2020-04-23 VITALS
TEMPERATURE: 98.3 F | BODY MASS INDEX: 21.03 KG/M2 | SYSTOLIC BLOOD PRESSURE: 126 MMHG | HEIGHT: 63 IN | HEART RATE: 79 BPM | WEIGHT: 118.7 LBS | DIASTOLIC BLOOD PRESSURE: 77 MMHG | RESPIRATION RATE: 16 BRPM

## 2020-04-23 DIAGNOSIS — Z34.80 PRENATAL CARE OF MULTIGRAVIDA, ANTEPARTUM: Primary | ICD-10-CM

## 2020-04-23 PROCEDURE — 36415 COLL VENOUS BLD VENIPUNCTURE: CPT | Performed by: OBSTETRICS & GYNECOLOGY

## 2020-04-23 PROCEDURE — 40000791 ZZHCL STATISTIC VERIFI PRENATAL TRISOMY 21,18,13: Mod: 90 | Performed by: OBSTETRICS & GYNECOLOGY

## 2020-04-23 PROCEDURE — 99207 ZZC PRENATAL VISIT: CPT | Performed by: OBSTETRICS & GYNECOLOGY

## 2020-04-23 PROCEDURE — 99000 SPECIMEN HANDLING OFFICE-LAB: CPT | Performed by: OBSTETRICS & GYNECOLOGY

## 2020-04-23 ASSESSMENT — MIFFLIN-ST. JEOR: SCORE: 1187.55

## 2020-04-23 NOTE — PROGRESS NOTES
"CC: Here for routine prenatal visit @ 18w3d   HPI: + FM, no ctx, no LOF, no VB.  No complaints.     PE: /77 (BP Location: Right arm, Patient Position: Chair, Cuff Size: Adult Regular)   Pulse 79   Temp 98.3  F (36.8  C) (Tympanic)   Resp 16   Ht 1.6 m (5' 3\")   Wt 53.8 kg (118 lb 11.2 oz)   LMP 2019   Breastfeeding No   BMI 21.03 kg/m     See OB flowsheet    U/S scheduled for     A/P  @ 18w3d normal pregnancy    1. Routine prenatal care.  COVID restrictions and recommendations reviewed including iron supplementation.   2. AMA: NIPT today    RTC 4 weeks.      Jayshree Garcia M.D.    "

## 2020-04-23 NOTE — NURSING NOTE
"Initial /77 (BP Location: Right arm, Patient Position: Chair, Cuff Size: Adult Regular)   Pulse 79   Temp 98.3  F (36.8  C) (Tympanic)   Resp 16   Ht 1.6 m (5' 3\")   Wt 53.8 kg (118 lb 11.2 oz)   LMP 12/16/2019   Breastfeeding No   BMI 21.03 kg/m   Estimated body mass index is 21.03 kg/m  as calculated from the following:    Height as of this encounter: 1.6 m (5' 3\").    Weight as of this encounter: 53.8 kg (118 lb 11.2 oz). .    Bri Camejo, ALTAGRACIA    "

## 2020-04-29 ENCOUNTER — TELEPHONE (OUTPATIENT)
Dept: OBGYN | Facility: CLINIC | Age: 40
End: 2020-04-29

## 2020-04-29 DIAGNOSIS — Z34.80 PRENATAL CARE OF MULTIGRAVIDA, ANTEPARTUM: ICD-10-CM

## 2020-04-29 LAB — LAB SCANNED RESULT: NORMAL

## 2020-04-29 NOTE — TELEPHONE ENCOUNTER
Patient notified of normal test results on [facility] lab test.    Sex chromosomes were not ordered.     Pt in agreement and reports understanding.    Lor Rand   [facility]  RN

## 2020-04-30 ENCOUNTER — PRE VISIT (OUTPATIENT)
Dept: MATERNAL FETAL MEDICINE | Facility: CLINIC | Age: 40
End: 2020-04-30

## 2020-05-07 ENCOUNTER — OFFICE VISIT (OUTPATIENT)
Dept: MATERNAL FETAL MEDICINE | Facility: CLINIC | Age: 40
End: 2020-05-07
Attending: OBSTETRICS & GYNECOLOGY
Payer: COMMERCIAL

## 2020-05-07 ENCOUNTER — HOSPITAL ENCOUNTER (OUTPATIENT)
Dept: ULTRASOUND IMAGING | Facility: CLINIC | Age: 40
End: 2020-05-07
Attending: OBSTETRICS & GYNECOLOGY
Payer: COMMERCIAL

## 2020-05-07 DIAGNOSIS — O26.90 PREGNANCY RELATED CONDITION, ANTEPARTUM: ICD-10-CM

## 2020-05-07 DIAGNOSIS — O09.522 MULTIGRAVIDA OF ADVANCED MATERNAL AGE IN SECOND TRIMESTER: Primary | ICD-10-CM

## 2020-05-07 PROCEDURE — 76811 OB US DETAILED SNGL FETUS: CPT

## 2020-05-07 NOTE — PROGRESS NOTES
Please see full imaging report from [facility] program under imaging tab.    Normal anatomic survey.   Partial circumvallate placenta.   Follow up growth 28, 34 weeks.     Shyam Sutton MD  Maternal Fetal Medicine

## 2020-05-21 ENCOUNTER — VIRTUAL VISIT (OUTPATIENT)
Dept: OBGYN | Facility: CLINIC | Age: 40
End: 2020-05-21
Payer: COMMERCIAL

## 2020-05-21 VITALS — HEIGHT: 63 IN | BODY MASS INDEX: 21.03 KG/M2

## 2020-05-21 DIAGNOSIS — Z34.80 PRENATAL CARE OF MULTIGRAVIDA, ANTEPARTUM: Primary | ICD-10-CM

## 2020-05-21 PROCEDURE — 99207 ZZC PRENATAL VISIT: CPT | Performed by: OBSTETRICS & GYNECOLOGY

## 2020-05-21 NOTE — PROGRESS NOTES
"María Briones is a 39 year old female who is being evaluated via a billable telephone visit.      The patient has been notified of following:     \"This telephone visit will be conducted via a call between you and your physician/provider. We have found that certain health care needs can be provided without the need for a physical exam.  This service lets us provide the care you need with a short phone conversation.  If a prescription is necessary we can send it directly to your pharmacy.  If lab work is needed we can place an order for that and you can then stop by our lab to have the test done at a later time.    Telephone visits are billed at different rates depending on your insurance coverage. During this emergency period, for some insurers they may be billed the same as an in-person visit.  Please reach out to your insurance provider with any questions.    If during the course of the call the physician/provider feels a telephone visit is not appropriate, you will not be charged for this service.\"    Patient has given verbal consent for Telephone visit?  Yes    What phone number would you like to be contacted at? [phone]      How would you like to obtain your AVS? Mat     CC: Here for routine prenatal visit @ 22w3d   HPI: + FM, no ctx, no LOF, no VB.  No complaints.     PE: Ht 1.6 m (5' 3\")   LMP 2019   Breastfeeding No   BMI 21.03 kg/m     See OB flowsheet    A/P  @ 22w3d normal pregnancy    1. Routine prenatal care.  COVID restrictions and recommendations reviewed including iron supplementation.  2. Circumvallate placenta: growth scans ordered for 28 & 34 weeks    RTC 4 weeks.      Jayshree Garcia M.D.      Phone call duration: 10 minutes          "

## 2020-06-18 ENCOUNTER — PRENATAL OFFICE VISIT (OUTPATIENT)
Dept: OBGYN | Facility: CLINIC | Age: 40
End: 2020-06-18
Payer: COMMERCIAL

## 2020-06-18 VITALS
SYSTOLIC BLOOD PRESSURE: 104 MMHG | WEIGHT: 123.6 LBS | HEIGHT: 63 IN | DIASTOLIC BLOOD PRESSURE: 55 MMHG | RESPIRATION RATE: 16 BRPM | HEART RATE: 67 BPM | TEMPERATURE: 97 F | BODY MASS INDEX: 21.9 KG/M2

## 2020-06-18 DIAGNOSIS — Z34.80 PRENATAL CARE OF MULTIGRAVIDA, ANTEPARTUM: Primary | ICD-10-CM

## 2020-06-18 LAB
ERYTHROCYTE [DISTWIDTH] IN BLOOD BY AUTOMATED COUNT: 12.2 % (ref 10–15)
GLUCOSE 1H P 50 G GLC PO SERPL-MCNC: 136 MG/DL (ref 60–129)
HCT VFR BLD AUTO: 31.2 % (ref 35–47)
HGB BLD-MCNC: 10.5 G/DL (ref 11.7–15.7)
MCH RBC QN AUTO: 29.5 PG (ref 26.5–33)
MCHC RBC AUTO-ENTMCNC: 33.7 G/DL (ref 31.5–36.5)
MCV RBC AUTO: 88 FL (ref 78–100)
PLATELET # BLD AUTO: 244 10E9/L (ref 150–450)
RBC # BLD AUTO: 3.56 10E12/L (ref 3.8–5.2)
WBC # BLD AUTO: 10.1 10E9/L (ref 4–11)

## 2020-06-18 PROCEDURE — 99207 ZZC PRENATAL VISIT: CPT | Performed by: OBSTETRICS & GYNECOLOGY

## 2020-06-18 PROCEDURE — 82950 GLUCOSE TEST: CPT | Performed by: OBSTETRICS & GYNECOLOGY

## 2020-06-18 PROCEDURE — 85027 COMPLETE CBC AUTOMATED: CPT | Performed by: OBSTETRICS & GYNECOLOGY

## 2020-06-18 PROCEDURE — 86780 TREPONEMA PALLIDUM: CPT | Performed by: OBSTETRICS & GYNECOLOGY

## 2020-06-18 PROCEDURE — 36415 COLL VENOUS BLD VENIPUNCTURE: CPT | Performed by: OBSTETRICS & GYNECOLOGY

## 2020-06-18 ASSESSMENT — MIFFLIN-ST. JEOR: SCORE: 1204.78

## 2020-06-18 NOTE — NURSING NOTE
"Initial /55 (BP Location: Left arm, Patient Position: Chair, Cuff Size: Adult Regular)   Pulse 67   Temp 97  F (36.1  C) (Tympanic)   Resp 16   Ht 1.6 m (5' 3\")   Wt 56.1 kg (123 lb 9.6 oz)   LMP 12/16/2019   Breastfeeding No   BMI 21.89 kg/m   Estimated body mass index is 21.89 kg/m  as calculated from the following:    Height as of this encounter: 1.6 m (5' 3\").    Weight as of this encounter: 56.1 kg (123 lb 9.6 oz). .    Bri Camejo, [facility]    "

## 2020-06-18 NOTE — RESULT ENCOUNTER NOTE
Pt notified of below.  Pt reports understanding.  Pt does not have further questions or concerns.  Patient does not want to do the 3 hr gtt.  Reviewed doing a 2 week log checking sugars fasting in the morning, and 2 hours after every meal. Patient will send in log to review with Dr. Garcia. If she decides to do the 3 hr gtt, she will call back.    Lor Rand   [facility]  RN

## 2020-06-18 NOTE — PROGRESS NOTES
"CC: Here for routine prenatal visit @ 26w3d   HPI: + FM, no ctx, no LOF, no VB.  No complaints.     PE: /55 (BP Location: Left arm, Patient Position: Chair, Cuff Size: Adult Regular)   Pulse 67   Temp 97  F (36.1  C) (Tympanic)   Resp 16   Ht 1.6 m (5' 3\")   Wt 56.1 kg (123 lb 9.6 oz)   LMP 2019   Breastfeeding No   BMI 21.89 kg/m     See OB flowsheet    A/P  @ 26w3d normal pregnancy    1. Routine prenatal care.  Tdap and GCT today  2. Circumvallate Placenta: plan growth scans @ 28 & 34 weeks    Phone visit in 4 weeks.      Jayshree Garcia M.D.    "

## 2020-06-19 LAB — T PALLIDUM AB SER QL: NONREACTIVE

## 2020-06-29 ENCOUNTER — HOSPITAL ENCOUNTER (OUTPATIENT)
Dept: ULTRASOUND IMAGING | Facility: CLINIC | Age: 40
Discharge: HOME | End: 2020-06-29
Attending: OBSTETRICS & GYNECOLOGY | Admitting: OBSTETRICS & GYNECOLOGY
Payer: COMMERCIAL

## 2020-06-29 DIAGNOSIS — Z34.80 PRENATAL CARE OF MULTIGRAVIDA, ANTEPARTUM: ICD-10-CM

## 2020-06-29 PROCEDURE — 76816 OB US FOLLOW-UP PER FETUS: CPT

## 2020-07-23 ENCOUNTER — TELEPHONE (OUTPATIENT)
Dept: OBGYN | Facility: CLINIC | Age: 40
End: 2020-07-23

## 2020-07-23 ENCOUNTER — VIRTUAL VISIT (OUTPATIENT)
Dept: OBGYN | Facility: CLINIC | Age: 40
End: 2020-07-23
Payer: COMMERCIAL

## 2020-07-23 DIAGNOSIS — Z11.59 ENCOUNTER FOR SCREENING FOR OTHER VIRAL DISEASES: Primary | ICD-10-CM

## 2020-07-23 DIAGNOSIS — Z98.891 H/O: C-SECTION: Primary | ICD-10-CM

## 2020-07-23 DIAGNOSIS — Z34.83 PRENATAL CARE, SUBSEQUENT PREGNANCY IN THIRD TRIMESTER: ICD-10-CM

## 2020-07-23 PROCEDURE — 99207 ZZC PRENATAL VISIT: CPT | Performed by: OBSTETRICS & GYNECOLOGY

## 2020-07-23 RX ORDER — CEFAZOLIN SODIUM 2 G/100ML
2 INJECTION, SOLUTION INTRAVENOUS
Status: CANCELLED | OUTPATIENT
Start: 2020-07-23

## 2020-07-23 RX ORDER — CEFAZOLIN SODIUM 1 G/50ML
1 INJECTION, SOLUTION INTRAVENOUS SEE ADMIN INSTRUCTIONS
Status: CANCELLED | OUTPATIENT
Start: 2020-07-23

## 2020-07-23 RX ORDER — ACETAMINOPHEN 325 MG/1
975 TABLET ORAL ONCE
Status: CANCELLED | OUTPATIENT
Start: 2020-07-23 | End: 2020-07-23

## 2020-07-23 RX ORDER — CITRIC ACID/SODIUM CITRATE 334-500MG
30 SOLUTION, ORAL ORAL
Status: CANCELLED | OUTPATIENT
Start: 2020-07-23

## 2020-07-23 RX ORDER — SODIUM CHLORIDE, SODIUM LACTATE, POTASSIUM CHLORIDE, CALCIUM CHLORIDE 600; 310; 30; 20 MG/100ML; MG/100ML; MG/100ML; MG/100ML
INJECTION, SOLUTION INTRAVENOUS CONTINUOUS
Status: CANCELLED | OUTPATIENT
Start: 2020-07-23

## 2020-07-23 NOTE — PROGRESS NOTES
"María Briones is a 40 year old female who is being evaluated via a billable telephone visit.      The patient has been notified of following:     \"This telephone visit will be conducted via a call between you and your physician/provider. We have found that certain health care needs can be provided without the need for a physical exam.  This service lets us provide the care you need with a short phone conversation.  If a prescription is necessary we can send it directly to your pharmacy.  If lab work is needed we can place an order for that and you can then stop by our lab to have the test done at a later time.    Telephone visits are billed at different rates depending on your insurance coverage. During this emergency period, for some insurers they may be billed the same as an in-person visit.  Please reach out to your insurance provider with any questions.    If during the course of the call the physician/provider feels a telephone visit is not appropriate, you will not be charged for this service.\"    Patient has given verbal consent for Telephone visit?  Yes    What phone number would you like to be contacted at? [phone]      How would you like to obtain your AVS? Mat    CC: Here for routine prenatal visit @ 31w3d   HPI: + FM, no ctx, no LOF, no VB.  No complaints.         A/P  @ 31w3d normal pregnancy    1. Routine prenatal care  2. GDM: her fasting/2PP levels sent are > 90% within target; patient can discontinue testing  3. : plan repeat for .  Considering postpartum tubal ligation.  Reviewed R/B/A of Tubal Ligation including but not limited to bleeding, infection, damage to other organs, 4-1000 failure (although likely less with salpingectomy), increased risk of ectopic (although minimal with salpingectomy), and risk of regret.  Reviewed permanence of procedure.  Reviewed pre and post op course.  All questions answered.     RTC 2 weeks.      Jayshree Garcia M.D.  "     Phone call duration: 15 minutes

## 2020-07-23 NOTE — TELEPHONE ENCOUNTER
"  You are now scheduled for surgery at [facility].  Below are the details for your surgery.  Please read the \"Preparing for Your Surgery\" instructions and let us know if you have any questions.    Type of surgery:  SECTION  Surgeon:  Jayshree Garcia MD  Location of surgery: [facility] OR    Date of surgery: 20    Time: 9:00am   Arrival Time: 7:30am    Time can change, to be confirmed a couple of days prior by pre-op surgery nurse.    Pre-Op Appt Date: Last OB Visit  Post-Op Appt Date: To be determined by provider     Packet sent out: Yes  Pre-cert/Authorization completed:  TBD by Financial Securing Office.   MA Sterilization/Hysterectomy Acknowledgment Consent signed: Not Applicable    [facility]  [phone]    Fax: [phone]  Same Day Surgery [phone]  Fax: [phone]    "

## 2020-07-29 ENCOUNTER — MYC MEDICAL ADVICE (OUTPATIENT)
Dept: OBGYN | Facility: CLINIC | Age: 40
End: 2020-07-29

## 2020-08-06 ENCOUNTER — PRENATAL OFFICE VISIT (OUTPATIENT)
Dept: OBGYN | Facility: CLINIC | Age: 40
End: 2020-08-06
Payer: COMMERCIAL

## 2020-08-06 ENCOUNTER — PREP FOR PROCEDURE (OUTPATIENT)
Dept: OBGYN | Facility: CLINIC | Age: 40
End: 2020-08-06

## 2020-08-06 VITALS
DIASTOLIC BLOOD PRESSURE: 57 MMHG | HEART RATE: 81 BPM | WEIGHT: 129 LBS | BODY MASS INDEX: 22.85 KG/M2 | SYSTOLIC BLOOD PRESSURE: 109 MMHG | TEMPERATURE: 98.3 F

## 2020-08-06 DIAGNOSIS — Z34.83 PRENATAL CARE, SUBSEQUENT PREGNANCY IN THIRD TRIMESTER: Primary | ICD-10-CM

## 2020-08-06 PROBLEM — Z34.80 PRENATAL CARE OF MULTIGRAVIDA, ANTEPARTUM: Status: RESOLVED | Noted: 2020-02-20 | Resolved: 2020-08-06

## 2020-08-06 PROCEDURE — 99207 ZZC PRENATAL VISIT: CPT | Performed by: OBSTETRICS & GYNECOLOGY

## 2020-08-06 PROCEDURE — 90471 IMMUNIZATION ADMIN: CPT | Performed by: OBSTETRICS & GYNECOLOGY

## 2020-08-06 PROCEDURE — 90715 TDAP VACCINE 7 YRS/> IM: CPT | Performed by: OBSTETRICS & GYNECOLOGY

## 2020-08-06 NOTE — NURSING NOTE
"Initial /57 (BP Location: Left arm, Patient Position: Chair, Cuff Size: Adult Regular)   Pulse 81   Temp 98.3  F (36.8  C) (Tympanic)   Wt 58.5 kg (129 lb)   LMP 12/16/2019   Breastfeeding No   BMI 22.85 kg/m   Estimated body mass index is 22.85 kg/m  as calculated from the following:    Height as of 6/18/20: 1.6 m (5' 3\").    Weight as of this encounter: 58.5 kg (129 lb). .    Zuleima Stafford MA    "

## 2020-08-06 NOTE — PROGRESS NOTES
CC: Here for routine prenatal visit @ 33w3d   HPI: + FM, no ctx, no LOF, no VB.  No complaints.     PE: /57 (BP Location: Left arm, Patient Position: Chair, Cuff Size: Adult Regular)   Pulse 81   Temp 98.3  F (36.8  C) (Tympanic)   Wt 58.5 kg (129 lb)   LMP 2019   Breastfeeding No   BMI 22.85 kg/m     See OB flowsheet    A/P  @ 33w3d normal pregnancy    1. Routine prenatal care  2.  scheduled for 2020.  Would like to proceed with postpartum tubal ligation at time of surgery.  Case modified accordingly.      RTC 2 weeks.      Jayshree Garcia M.D.

## 2020-08-10 ENCOUNTER — HOSPITAL ENCOUNTER (OUTPATIENT)
Dept: ULTRASOUND IMAGING | Facility: CLINIC | Age: 40
Discharge: HOME | End: 2020-08-10
Attending: OBSTETRICS & GYNECOLOGY | Admitting: OBSTETRICS & GYNECOLOGY
Payer: COMMERCIAL

## 2020-08-10 DIAGNOSIS — Z34.80 PRENATAL CARE OF MULTIGRAVIDA, ANTEPARTUM: ICD-10-CM

## 2020-08-10 PROCEDURE — 76816 OB US FOLLOW-UP PER FETUS: CPT

## 2020-08-20 ENCOUNTER — PRENATAL OFFICE VISIT (OUTPATIENT)
Dept: OBGYN | Facility: CLINIC | Age: 40
End: 2020-08-20
Payer: COMMERCIAL

## 2020-08-20 VITALS
WEIGHT: 129.6 LBS | HEART RATE: 82 BPM | RESPIRATION RATE: 16 BRPM | BODY MASS INDEX: 22.96 KG/M2 | HEIGHT: 63 IN | DIASTOLIC BLOOD PRESSURE: 75 MMHG | SYSTOLIC BLOOD PRESSURE: 123 MMHG

## 2020-08-20 DIAGNOSIS — Z34.83 PRENATAL CARE, SUBSEQUENT PREGNANCY IN THIRD TRIMESTER: Primary | ICD-10-CM

## 2020-08-20 PROCEDURE — 87653 STREP B DNA AMP PROBE: CPT | Performed by: OBSTETRICS & GYNECOLOGY

## 2020-08-20 PROCEDURE — 99207 ZZC PRENATAL VISIT: CPT | Performed by: OBSTETRICS & GYNECOLOGY

## 2020-08-20 ASSESSMENT — MIFFLIN-ST. JEOR: SCORE: 1231.99

## 2020-08-20 NOTE — NURSING NOTE
"Initial /75 (BP Location: Right arm, Patient Position: Chair, Cuff Size: Adult Regular)   Pulse 82   Resp 16   Ht 1.6 m (5' 3\")   Wt 58.8 kg (129 lb 9.6 oz)   LMP 12/16/2019   Breastfeeding No   BMI 22.96 kg/m   Estimated body mass index is 22.96 kg/m  as calculated from the following:    Height as of this encounter: 1.6 m (5' 3\").    Weight as of this encounter: 58.8 kg (129 lb 9.6 oz). .    Bri Camejo, [facility]    "

## 2020-08-20 NOTE — PROGRESS NOTES
"CC: Here for routine prenatal visit @ 35w3d   HPI: + FM, no ctx, no LOF, no VB.  No complaints.     PE: /75 (BP Location: Right arm, Patient Position: Chair, Cuff Size: Adult Regular)   Pulse 82   Resp 16   Ht 1.6 m (5' 3\")   Wt 58.8 kg (129 lb 9.6 oz)   LMP 2019   Breastfeeding No   BMI 22.96 kg/m     See OB flowsheet    GBS done    A/P  @ 35w3d normal pregnancy    1. Routine prenatal care.  COVID restrictions and recommendations reviewed including iron supplementation.     RTC 2 weeks.      Jayshree Garcia M.D.    "

## 2020-08-21 LAB
GP B STREP DNA SPEC QL NAA+PROBE: NEGATIVE
SPECIMEN SOURCE: NORMAL

## 2020-09-01 ENCOUNTER — PRENATAL OFFICE VISIT (OUTPATIENT)
Dept: OBGYN | Facility: CLINIC | Age: 40
End: 2020-09-01
Payer: COMMERCIAL

## 2020-09-01 VITALS
HEIGHT: 63 IN | HEART RATE: 79 BPM | BODY MASS INDEX: 23.71 KG/M2 | DIASTOLIC BLOOD PRESSURE: 73 MMHG | RESPIRATION RATE: 16 BRPM | TEMPERATURE: 98.8 F | SYSTOLIC BLOOD PRESSURE: 139 MMHG | WEIGHT: 133.8 LBS

## 2020-09-01 DIAGNOSIS — Z34.83 PRENATAL CARE, SUBSEQUENT PREGNANCY IN THIRD TRIMESTER: Primary | ICD-10-CM

## 2020-09-01 PROCEDURE — 99207 ZZC PRENATAL VISIT: CPT | Performed by: OBSTETRICS & GYNECOLOGY

## 2020-09-01 ASSESSMENT — MIFFLIN-ST. JEOR: SCORE: 1251.04

## 2020-09-01 NOTE — NURSING NOTE
"Initial /73 (BP Location: Right arm, Patient Position: Chair, Cuff Size: Adult Regular)   Pulse 79   Temp 98.8  F (37.1  C) (Tympanic)   Resp 16   Ht 1.6 m (5' 3\")   Wt 60.7 kg (133 lb 12.8 oz)   LMP 12/16/2019   Breastfeeding No   BMI 23.70 kg/m   Estimated body mass index is 23.7 kg/m  as calculated from the following:    Height as of this encounter: 1.6 m (5' 3\").    Weight as of this encounter: 60.7 kg (133 lb 12.8 oz). .    Bri Camejo, ALTAGRACIA    "

## 2020-09-08 ENCOUNTER — PRENATAL OFFICE VISIT (OUTPATIENT)
Dept: OBGYN | Facility: CLINIC | Age: 40
End: 2020-09-08
Payer: COMMERCIAL

## 2020-09-08 VITALS
DIASTOLIC BLOOD PRESSURE: 68 MMHG | BODY MASS INDEX: 23.21 KG/M2 | RESPIRATION RATE: 18 BRPM | TEMPERATURE: 98.3 F | HEIGHT: 63 IN | WEIGHT: 131 LBS | SYSTOLIC BLOOD PRESSURE: 120 MMHG

## 2020-09-08 DIAGNOSIS — Z34.83 PRENATAL CARE, SUBSEQUENT PREGNANCY IN THIRD TRIMESTER: Primary | ICD-10-CM

## 2020-09-08 DIAGNOSIS — Z98.891 H/O: C-SECTION: ICD-10-CM

## 2020-09-08 DIAGNOSIS — Z23 NEED FOR PROPHYLACTIC VACCINATION AND INOCULATION AGAINST INFLUENZA: ICD-10-CM

## 2020-09-08 PROCEDURE — 90686 IIV4 VACC NO PRSV 0.5 ML IM: CPT | Performed by: OBSTETRICS & GYNECOLOGY

## 2020-09-08 PROCEDURE — 90471 IMMUNIZATION ADMIN: CPT | Performed by: OBSTETRICS & GYNECOLOGY

## 2020-09-08 PROCEDURE — 99207 ZZC PRENATAL VISIT: CPT | Performed by: OBSTETRICS & GYNECOLOGY

## 2020-09-08 ASSESSMENT — MIFFLIN-ST. JEOR: SCORE: 1238.34

## 2020-09-08 NOTE — PROGRESS NOTES
+ FM, no ctx, no LOF, no VB.  No complaints.     PRE-OP EVALUATION:  Today's date: 2020    María Briones (: 1980) presents for pre-operative evaluation assessment.  She requires evaluation and anesthesia risk assessment prior to undergoing surgery/procedure for treatment of prior , desires postpartum tubal ligation  .    Proposed Surgery/ Procedure:  with postpartum tubal ligation   Date of Surgery/ Procedure:   Time of Surgery/ Procedure: 0900  Hospital/Surgical Facility: [facility]  Primary Physician: No Ref-Primary, Physician  Type of Anesthesia Anticipated: Spinal    Preoperative Questionnaire:   No - Have you ever had a heart attack or stroke?  No - Have you ever had surgery on your heart or blood vessels, such as a stent, coronary (heart) bypass, or surgery on an artery in the head, neck, heart, or legs?  No - Do you have chest pain when you are physically active?  No - Do you have a history of heart failure?  No - Do you currently have a cold, bronchitis, or symptoms of other respiratory (head and chest) infections?  No - Do you have a cough, shortness of breath, or wheezing?  YES - DO YOU OR ANYONE IN YOUR FAMILY HAVE A HISTORY OF BLOOD CLOTS? Father post-operatively   No - Do you or anyone in your family have a serious bleeding problem, such as long-lasting bleeding after surgeries or cuts?  YES - HAVE YOU EVERY HAD ANEMIA OR BEEN TOLD TO TAKE IRON PILLS? pregnant  No - Have you had any abnormal blood loss such as black, tarry or bloody stools, or abnormal vaginal bleeding?  No - Have you ever had a blood transfusion?  Yes - Are you willing to have a blood transfusion if it is medically needed before, during, or after your surgery?  No - Have you or anyone in your family ever had problems with anesthesia (sedation for surgery)?  No - Do you have sleep apnea, excessive snoring, or daytime drowsiness?   No - Do you have any artifical heart valves or other implanted medical devices,  such as a pacemaker, defibrillator, or continuous glucose monitor?  No - Do you have any artifical joints?  No - Are you allergic to latex?  YES - IS THERE ANY CHANCE THAT YOU MAY BE PREGNANT? current    Patient has a Health Care Directive or Living Will:  YES has a copy at home    HPI:     HPI related to upcoming procedure: María Briones is a 40 year old  @ 38w1d with plans for repeat  and postpartum tubal ligation at 39 weeks.       See problem list for active medical problems.  Problems all longstanding and stable, except as noted/documented.  See ROS for pertinent symptoms related to these conditions.      MEDICAL HISTORY:     Patient Active Problem List    Diagnosis Date Noted     Prenatal care, subsequent pregnancy in third trimester 03/10/2020     Priority: High     Added automatically from request for surgery [id number]       H/O:  03/10/2020     Priority: Medium     Added automatically from request for surgery [id number]       Other specified hypothyroidism 2018     Priority: Medium     Female infertility 2014     Priority: Medium     History of Stage III endometriosis by laparoscopy on 4/15/14    Prior Successful IVF       Dysmenorrhea 2014     Priority: Medium     Pes anserinus tendinitis or bursitis 10/24/2012     Priority: Medium      Past Medical History:   Diagnosis Date     Chickenpox      Incisional hernia, without obstruction or gangrene 10/10/2019     Palpitations 2008     Urinary tract bacterial infections     as chold     Past Surgical History:   Procedure Laterality Date     C ORAL SURGERY PROCEDURE      wisdom teeth      SECTION N/A 10/12/2015    Procedure:  SECTION;  Surgeon: Jayshree Garcia MD;  Location: [facility]      SECTION N/A 2018    Procedure:  Section;  Surgeon: Jayshree Garcia MD;  Location: [facility]     HERNIORRHAPHY INCISIONAL (LOCATION) N/A 2019    Procedure: Incisional hernia repair;  Surgeon:  "Tani Sinclair, DO;  Location: [facility]     LAPAROSCOPIC ABLATION ENDOMETRIOSIS  4/15/2014    Procedure: LAPAROSCOPIC ABLATION ENDOMETRIOSIS;  Surgeon: Mohamud Osorio MD;  [facility]     LAPAROSCOPIC TUBAL DYE STUDY  4/15/2014    Procedure: LAPAROSCOPIC TUBAL DYE STUDY;  Surgeon: Mohamud Osorio MD;  [facility]     LAPAROSCOPY DIAGNOSTIC (GYN)  4/15/2014    Procedure: Operative Laparoscopy, Excision Cauterization Of Endometriosis, Chromopertubation ;  Surgeon: Mohamud Osorio MD;  Location: [facility]     Current Outpatient Medications   Medication Sig Dispense Refill     Ferrous Sulfate (IRON PO)        Prenatal Vit-Fe Fumarate-FA (PRENATAL MULTIVITAMIN  PLUS IRON) 27-0.8 MG TABS Take 1 tablet by mouth daily       triamcinolone (KENALOG) 0.1 % external cream Apply twice daily sparingly to affected are on elbow, knees as needed. (Patient not taking: Reported on 8/20/2020) 80 g 4     OTC products: None, except as noted above    Allergies   Allergen Reactions     Azithromycin Swelling     Lip swelling      Latex Allergy: NO    Social History     Tobacco Use     Smoking status: Never Smoker     Smokeless tobacco: Never Used   Substance Use Topics     Alcohol use: Not Currently     Comment: occas- quit with pregnancy     History   Drug Use No       REVIEW OF SYSTEMS:   CONSTITUTIONAL: NEGATIVE for fever, chills, change in weight  ENT/MOUTH: NEGATIVE for ear, mouth and throat problems  RESP: NEGATIVE for significant cough or SOB  CV: NEGATIVE for chest pain, palpitations or peripheral edema    EXAM:   /68 (BP Location: Left arm, Patient Position: Chair, Cuff Size: Adult Regular)   Temp 98.3  F (36.8  C) (Tympanic)   Resp 18   Ht 1.6 m (5' 3\")   Wt 59.4 kg (131 lb)   LMP 12/16/2019   BMI 23.21 kg/m    GENERAL APPEARANCE: healthy, alert and no distress  RESP: lungs clear to auscultation - no rales, rhonchi or wheezes  CV: regular rate and rhythm, normal S1 S2, no S3 or S4 and no murmur, click or " rub   ABDOMEN: soft, nontender, no HSM or masses and bowel sounds normal  NEURO: Normal strength and tone, sensory exam grossly normal, mentation intact and speech normal    DIAGNOSTICS:   Hemoglobin (indicated for history of anemia or procedure with significant blood loss such as tonsillectomy, major intraperitoneal surgery, vascular surgery, major spine surgery, total joint replacement)    Recent Labs   Lab Test 20  1208 20  0958  18  0752   HGB 10.5* 12.8   < > 13.6    283   < > 405   A1C  --   --   --  5.2    < > = values in this interval not displayed.        IMPRESSION:   Reason for surgery/procedure: prior , desires postpartum tubal ligation     The proposed surgical procedure is considered INTERMEDIATE risk.    REVISED CARDIAC RISK INDEX  The patient has the following serious cardiovascular risks for perioperative complications such as (MI, PE, VFib and 3  AV Block):  No serious cardiac risks  INTERPRETATION: 1 risks: Class II (low risk - 0.9% complication rate)    The patient has the following additional risks for perioperative complications:  No identified additional risks      ICD-10-CM    1. Prenatal care, subsequent pregnancy in third trimester  Z34.83    2. H/O:   Z98.891        RECOMMENDATIONS:       --Patient is to take all scheduled medications on the day of surgery EXCEPT for modifications listed below.    APPROVAL GIVEN to proceed with proposed procedure, without further diagnostic evaluation       Signed Electronically by: Jayshree Garcia MD    Copy of this evaluation report is provided to requesting physician.    [facility] Preop Guidelines    Revised Cardiac Risk Index

## 2020-09-13 DIAGNOSIS — Z11.59 ENCOUNTER FOR SCREENING FOR OTHER VIRAL DISEASES: ICD-10-CM

## 2020-09-13 PROCEDURE — U0003 INFECTIOUS AGENT DETECTION BY NUCLEIC ACID (DNA OR RNA); SEVERE ACUTE RESPIRATORY SYNDROME CORONAVIRUS 2 (SARS-COV-2) (CORONAVIRUS DISEASE [COVID-19]), AMPLIFIED PROBE TECHNIQUE, MAKING USE OF HIGH THROUGHPUT TECHNOLOGIES AS DESCRIBED BY CMS-2020-01-R: HCPCS | Performed by: OBSTETRICS & GYNECOLOGY

## 2020-09-14 LAB
SARS-COV-2 RNA SPEC QL NAA+PROBE: NOT DETECTED
SPECIMEN SOURCE: NORMAL

## 2020-09-15 ENCOUNTER — ANESTHESIA EVENT (OUTPATIENT)
Dept: SURGERY | Facility: CLINIC | Age: 40
End: 2020-09-15
Payer: COMMERCIAL

## 2020-09-16 ENCOUNTER — ANESTHESIA (OUTPATIENT)
Dept: SURGERY | Facility: CLINIC | Age: 40
End: 2020-09-16
Payer: COMMERCIAL

## 2020-09-16 ENCOUNTER — HOSPITAL ENCOUNTER (INPATIENT)
Facility: CLINIC | Age: 40
LOS: 2 days | Discharge: HOME | End: 2020-09-18
Attending: OBSTETRICS & GYNECOLOGY | Admitting: OBSTETRICS & GYNECOLOGY
Payer: COMMERCIAL

## 2020-09-16 DIAGNOSIS — Z34.83 PRENATAL CARE, SUBSEQUENT PREGNANCY IN THIRD TRIMESTER: ICD-10-CM

## 2020-09-16 DIAGNOSIS — Z98.891 H/O: C-SECTION: ICD-10-CM

## 2020-09-16 PROCEDURE — 25000125 ZZHC RX 250

## 2020-09-16 PROCEDURE — 58611 LIGATE OVIDUCT(S) ADD-ON: CPT | Performed by: OBSTETRICS & GYNECOLOGY

## 2020-09-16 PROCEDURE — 58611 LIGATE OVIDUCT(S) ADD-ON: CPT | Mod: 80 | Performed by: OBSTETRICS & GYNECOLOGY

## 2020-09-16 PROCEDURE — 88302 TISSUE EXAM BY PATHOLOGIST: CPT | Performed by: OBSTETRICS & GYNECOLOGY

## 2020-09-16 PROCEDURE — 59514 CESAREAN DELIVERY ONLY: CPT | Mod: 80 | Performed by: OBSTETRICS & GYNECOLOGY

## 2020-09-16 PROCEDURE — 25000128 H RX IP 250 OP 636: Performed by: NURSE ANESTHETIST, CERTIFIED REGISTERED

## 2020-09-16 PROCEDURE — 71000012 ZZH RECOVERY PHASE 1 LEVEL 1 FIRST HR: Performed by: OBSTETRICS & GYNECOLOGY

## 2020-09-16 PROCEDURE — 37000009 ZZH ANESTHESIA TECHNICAL FEE, EACH ADDTL 15 MIN: Performed by: OBSTETRICS & GYNECOLOGY

## 2020-09-16 PROCEDURE — 85018 HEMOGLOBIN: CPT | Performed by: OBSTETRICS & GYNECOLOGY

## 2020-09-16 PROCEDURE — 88302 TISSUE EXAM BY PATHOLOGIST: CPT | Mod: 26 | Performed by: OBSTETRICS & GYNECOLOGY

## 2020-09-16 PROCEDURE — 36415 COLL VENOUS BLD VENIPUNCTURE: CPT | Performed by: OBSTETRICS & GYNECOLOGY

## 2020-09-16 PROCEDURE — 36000058 ZZH SURGERY LEVEL 3 EA 15 ADDTL MIN: Performed by: OBSTETRICS & GYNECOLOGY

## 2020-09-16 PROCEDURE — 25000132 ZZH RX MED GY IP 250 OP 250 PS 637: Performed by: OBSTETRICS & GYNECOLOGY

## 2020-09-16 PROCEDURE — 86780 TREPONEMA PALLIDUM: CPT | Performed by: OBSTETRICS & GYNECOLOGY

## 2020-09-16 PROCEDURE — 86900 BLOOD TYPING SEROLOGIC ABO: CPT | Performed by: OBSTETRICS & GYNECOLOGY

## 2020-09-16 PROCEDURE — 27210794 ZZH OR GENERAL SUPPLY STERILE: Performed by: OBSTETRICS & GYNECOLOGY

## 2020-09-16 PROCEDURE — 86850 RBC ANTIBODY SCREEN: CPT | Performed by: OBSTETRICS & GYNECOLOGY

## 2020-09-16 PROCEDURE — 25800030 ZZH RX IP 258 OP 636: Performed by: NURSE ANESTHETIST, CERTIFIED REGISTERED

## 2020-09-16 PROCEDURE — 37000008 ZZH ANESTHESIA TECHNICAL FEE, 1ST 30 MIN: Performed by: OBSTETRICS & GYNECOLOGY

## 2020-09-16 PROCEDURE — 25000125 ZZHC RX 250: Performed by: OBSTETRICS & GYNECOLOGY

## 2020-09-16 PROCEDURE — 27210995 ZZH RX 272: Performed by: OBSTETRICS & GYNECOLOGY

## 2020-09-16 PROCEDURE — 12000000 ZZH R&B MED SURG/OB

## 2020-09-16 PROCEDURE — 25800030 ZZH RX IP 258 OP 636: Performed by: OBSTETRICS & GYNECOLOGY

## 2020-09-16 PROCEDURE — 0UB70ZZ EXCISION OF BILATERAL FALLOPIAN TUBES, OPEN APPROACH: ICD-10-PCS | Performed by: OBSTETRICS & GYNECOLOGY

## 2020-09-16 PROCEDURE — 71000013 ZZH RECOVERY PHASE 1 LEVEL 1 EA ADDTL HR: Performed by: OBSTETRICS & GYNECOLOGY

## 2020-09-16 PROCEDURE — 25000125 ZZHC RX 250: Performed by: NURSE ANESTHETIST, CERTIFIED REGISTERED

## 2020-09-16 PROCEDURE — 86901 BLOOD TYPING SEROLOGIC RH(D): CPT | Performed by: OBSTETRICS & GYNECOLOGY

## 2020-09-16 PROCEDURE — 59510 CESAREAN DELIVERY: CPT | Performed by: OBSTETRICS & GYNECOLOGY

## 2020-09-16 PROCEDURE — 27110028 ZZH OR GENERAL SUPPLY NON-STERILE: Performed by: OBSTETRICS & GYNECOLOGY

## 2020-09-16 PROCEDURE — 36000056 ZZH SURGERY LEVEL 3 1ST 30 MIN: Performed by: OBSTETRICS & GYNECOLOGY

## 2020-09-16 PROCEDURE — C9290 INJ, BUPIVACAINE LIPOSOME: HCPCS | Performed by: NURSE ANESTHETIST, CERTIFIED REGISTERED

## 2020-09-16 PROCEDURE — 25000128 H RX IP 250 OP 636: Performed by: OBSTETRICS & GYNECOLOGY

## 2020-09-16 RX ORDER — ONDANSETRON 2 MG/ML
4 INJECTION INTRAMUSCULAR; INTRAVENOUS EVERY 6 HOURS PRN
Status: DISCONTINUED | OUTPATIENT
Start: 2020-09-16 | End: 2020-09-18 | Stop reason: HOSPADM

## 2020-09-16 RX ORDER — NALBUPHINE HYDROCHLORIDE 10 MG/ML
2.5-5 INJECTION, SOLUTION INTRAMUSCULAR; INTRAVENOUS; SUBCUTANEOUS EVERY 6 HOURS PRN
Status: DISCONTINUED | OUTPATIENT
Start: 2020-09-16 | End: 2020-09-16

## 2020-09-16 RX ORDER — AMOXICILLIN 250 MG
2 CAPSULE ORAL 2 TIMES DAILY
Status: DISCONTINUED | OUTPATIENT
Start: 2020-09-16 | End: 2020-09-18 | Stop reason: HOSPADM

## 2020-09-16 RX ORDER — PHENYLEPHRINE HYDROCHLORIDE 10 MG/ML
INJECTION INTRAVENOUS PRN
Status: DISCONTINUED | OUTPATIENT
Start: 2020-09-16 | End: 2020-09-16

## 2020-09-16 RX ORDER — TRANEXAMIC ACID 10 MG/ML
1 INJECTION, SOLUTION INTRAVENOUS EVERY 30 MIN PRN
Status: DISCONTINUED | OUTPATIENT
Start: 2020-09-16 | End: 2020-09-18 | Stop reason: HOSPADM

## 2020-09-16 RX ORDER — MODIFIED LANOLIN
OINTMENT (GRAM) TOPICAL
Status: DISCONTINUED | OUTPATIENT
Start: 2020-09-16 | End: 2020-09-18 | Stop reason: HOSPADM

## 2020-09-16 RX ORDER — CEFAZOLIN SODIUM 2 G/100ML
2 INJECTION, SOLUTION INTRAVENOUS
Status: COMPLETED | OUTPATIENT
Start: 2020-09-16 | End: 2020-09-16

## 2020-09-16 RX ORDER — BUPIVACAINE HYDROCHLORIDE 7.5 MG/ML
INJECTION, SOLUTION INTRASPINAL PRN
Status: DISCONTINUED | OUTPATIENT
Start: 2020-09-16 | End: 2020-09-16

## 2020-09-16 RX ORDER — OXYTOCIN 10 [USP'U]/ML
10 INJECTION, SOLUTION INTRAMUSCULAR; INTRAVENOUS
Status: DISCONTINUED | OUTPATIENT
Start: 2020-09-16 | End: 2020-09-18 | Stop reason: HOSPADM

## 2020-09-16 RX ORDER — OXYCODONE HYDROCHLORIDE 5 MG/1
5 TABLET ORAL EVERY 4 HOURS PRN
Status: DISCONTINUED | OUTPATIENT
Start: 2020-09-16 | End: 2020-09-18 | Stop reason: HOSPADM

## 2020-09-16 RX ORDER — PRENATAL VIT/IRON FUM/FOLIC AC 27MG-0.8MG
1 TABLET ORAL DAILY
Status: DISCONTINUED | OUTPATIENT
Start: 2020-09-16 | End: 2020-09-18 | Stop reason: HOSPADM

## 2020-09-16 RX ORDER — PROCHLORPERAZINE 25 MG
25 SUPPOSITORY, RECTAL RECTAL EVERY 12 HOURS PRN
Status: DISCONTINUED | OUTPATIENT
Start: 2020-09-16 | End: 2020-09-18 | Stop reason: HOSPADM

## 2020-09-16 RX ORDER — BISACODYL 10 MG
10 SUPPOSITORY, RECTAL RECTAL DAILY PRN
Status: DISCONTINUED | OUTPATIENT
Start: 2020-09-18 | End: 2020-09-18 | Stop reason: HOSPADM

## 2020-09-16 RX ORDER — ACETAMINOPHEN 325 MG/1
975 TABLET ORAL ONCE
Status: COMPLETED | OUTPATIENT
Start: 2020-09-16 | End: 2020-09-16

## 2020-09-16 RX ORDER — ONDANSETRON 2 MG/ML
INJECTION INTRAMUSCULAR; INTRAVENOUS PRN
Status: DISCONTINUED | OUTPATIENT
Start: 2020-09-16 | End: 2020-09-16

## 2020-09-16 RX ORDER — IBUPROFEN 800 MG/1
800 TABLET, FILM COATED ORAL EVERY 6 HOURS
Status: DISCONTINUED | OUTPATIENT
Start: 2020-09-17 | End: 2020-09-18 | Stop reason: HOSPADM

## 2020-09-16 RX ORDER — EPHEDRINE SULFATE 50 MG/ML
INJECTION, SOLUTION INTRAVENOUS PRN
Status: DISCONTINUED | OUTPATIENT
Start: 2020-09-16 | End: 2020-09-16

## 2020-09-16 RX ORDER — MORPHINE SULFATE 1 MG/ML
INJECTION, SOLUTION EPIDURAL; INTRATHECAL; INTRAVENOUS PRN
Status: DISCONTINUED | OUTPATIENT
Start: 2020-09-16 | End: 2020-09-16

## 2020-09-16 RX ORDER — OXYTOCIN/0.9 % SODIUM CHLORIDE 30/500 ML
100 PLASTIC BAG, INJECTION (ML) INTRAVENOUS CONTINUOUS
Status: DISCONTINUED | OUTPATIENT
Start: 2020-09-16 | End: 2020-09-18 | Stop reason: HOSPADM

## 2020-09-16 RX ORDER — MAGNESIUM HYDROXIDE 1200 MG/15ML
LIQUID ORAL PRN
Status: DISCONTINUED | OUTPATIENT
Start: 2020-09-16 | End: 2020-09-18 | Stop reason: HOSPADM

## 2020-09-16 RX ORDER — HYDROCORTISONE 2.5 %
CREAM (GRAM) TOPICAL 3 TIMES DAILY PRN
Status: DISCONTINUED | OUTPATIENT
Start: 2020-09-16 | End: 2020-09-18 | Stop reason: HOSPADM

## 2020-09-16 RX ORDER — AMOXICILLIN 250 MG
1 CAPSULE ORAL 2 TIMES DAILY
Status: DISCONTINUED | OUTPATIENT
Start: 2020-09-16 | End: 2020-09-18 | Stop reason: HOSPADM

## 2020-09-16 RX ORDER — LIDOCAINE HYDROCHLORIDE 10 MG/ML
INJECTION, SOLUTION EPIDURAL; INFILTRATION; INTRACAUDAL; PERINEURAL PRN
Status: DISCONTINUED | OUTPATIENT
Start: 2020-09-16 | End: 2020-09-16

## 2020-09-16 RX ORDER — EPHEDRINE SULFATE 50 MG/ML
5 INJECTION, SOLUTION INTRAMUSCULAR; INTRAVENOUS; SUBCUTANEOUS
Status: DISCONTINUED | OUTPATIENT
Start: 2020-09-16 | End: 2020-09-16

## 2020-09-16 RX ORDER — OXYTOCIN/0.9 % SODIUM CHLORIDE 30/500 ML
PLASTIC BAG, INJECTION (ML) INTRAVENOUS PRN
Status: DISCONTINUED | OUTPATIENT
Start: 2020-09-16 | End: 2020-09-16

## 2020-09-16 RX ORDER — DEXTROSE, SODIUM CHLORIDE, SODIUM LACTATE, POTASSIUM CHLORIDE, AND CALCIUM CHLORIDE 5; .6; .31; .03; .02 G/100ML; G/100ML; G/100ML; G/100ML; G/100ML
INJECTION, SOLUTION INTRAVENOUS CONTINUOUS
Status: DISCONTINUED | OUTPATIENT
Start: 2020-09-16 | End: 2020-09-18 | Stop reason: HOSPADM

## 2020-09-16 RX ORDER — OXYTOCIN/0.9 % SODIUM CHLORIDE 30/500 ML
340 PLASTIC BAG, INJECTION (ML) INTRAVENOUS CONTINUOUS PRN
Status: DISCONTINUED | OUTPATIENT
Start: 2020-09-16 | End: 2020-09-18 | Stop reason: HOSPADM

## 2020-09-16 RX ORDER — ACETAMINOPHEN 325 MG/1
975 TABLET ORAL EVERY 6 HOURS
Status: DISCONTINUED | OUTPATIENT
Start: 2020-09-16 | End: 2020-09-18 | Stop reason: HOSPADM

## 2020-09-16 RX ORDER — LIDOCAINE 40 MG/G
CREAM TOPICAL
Status: DISCONTINUED | OUTPATIENT
Start: 2020-09-16 | End: 2020-09-18 | Stop reason: HOSPADM

## 2020-09-16 RX ORDER — MISOPROSTOL 200 UG/1
400 TABLET ORAL
Status: DISCONTINUED | OUTPATIENT
Start: 2020-09-16 | End: 2020-09-18 | Stop reason: HOSPADM

## 2020-09-16 RX ORDER — CEFAZOLIN SODIUM 1 G/50ML
1 INJECTION, SOLUTION INTRAVENOUS SEE ADMIN INSTRUCTIONS
Status: DISCONTINUED | OUTPATIENT
Start: 2020-09-16 | End: 2020-09-16

## 2020-09-16 RX ORDER — SIMETHICONE 80 MG
80 TABLET,CHEWABLE ORAL 4 TIMES DAILY PRN
Status: DISCONTINUED | OUTPATIENT
Start: 2020-09-16 | End: 2020-09-18 | Stop reason: HOSPADM

## 2020-09-16 RX ORDER — LIDOCAINE 40 MG/G
CREAM TOPICAL
Status: DISCONTINUED | OUTPATIENT
Start: 2020-09-16 | End: 2020-09-16

## 2020-09-16 RX ORDER — NALOXONE HYDROCHLORIDE 0.4 MG/ML
.1-.4 INJECTION, SOLUTION INTRAMUSCULAR; INTRAVENOUS; SUBCUTANEOUS
Status: DISCONTINUED | OUTPATIENT
Start: 2020-09-16 | End: 2020-09-18 | Stop reason: HOSPADM

## 2020-09-16 RX ORDER — NALOXONE HYDROCHLORIDE 0.4 MG/ML
.1-.4 INJECTION, SOLUTION INTRAMUSCULAR; INTRAVENOUS; SUBCUTANEOUS
Status: DISCONTINUED | OUTPATIENT
Start: 2020-09-16 | End: 2020-09-16

## 2020-09-16 RX ORDER — KETOROLAC TROMETHAMINE 30 MG/ML
30 INJECTION, SOLUTION INTRAMUSCULAR; INTRAVENOUS EVERY 6 HOURS
Status: COMPLETED | OUTPATIENT
Start: 2020-09-16 | End: 2020-09-17

## 2020-09-16 RX ORDER — CITRIC ACID/SODIUM CITRATE 334-500MG
30 SOLUTION, ORAL ORAL
Status: COMPLETED | OUTPATIENT
Start: 2020-09-16 | End: 2020-09-16

## 2020-09-16 RX ORDER — SODIUM CHLORIDE, SODIUM LACTATE, POTASSIUM CHLORIDE, CALCIUM CHLORIDE 600; 310; 30; 20 MG/100ML; MG/100ML; MG/100ML; MG/100ML
INJECTION, SOLUTION INTRAVENOUS CONTINUOUS
Status: DISCONTINUED | OUTPATIENT
Start: 2020-09-16 | End: 2020-09-16

## 2020-09-16 RX ORDER — OXYTOCIN/0.9 % SODIUM CHLORIDE 30/500 ML
PLASTIC BAG, INJECTION (ML) INTRAVENOUS
Status: COMPLETED
Start: 2020-09-16 | End: 2020-09-16

## 2020-09-16 RX ORDER — FENTANYL CITRATE 50 UG/ML
INJECTION, SOLUTION INTRAMUSCULAR; INTRAVENOUS PRN
Status: DISCONTINUED | OUTPATIENT
Start: 2020-09-16 | End: 2020-09-16

## 2020-09-16 RX ORDER — OXYTOCIN 10 [USP'U]/ML
INJECTION, SOLUTION INTRAMUSCULAR; INTRAVENOUS PRN
Status: DISCONTINUED | OUTPATIENT
Start: 2020-09-16 | End: 2020-09-16

## 2020-09-16 RX ADMIN — SODIUM CHLORIDE, POTASSIUM CHLORIDE, SODIUM LACTATE AND CALCIUM CHLORIDE: 600; 310; 30; 20 INJECTION, SOLUTION INTRAVENOUS at 08:39

## 2020-09-16 RX ADMIN — Medication 300 ML: at 10:00

## 2020-09-16 RX ADMIN — ACETAMINOPHEN 975 MG: 325 TABLET, FILM COATED ORAL at 08:38

## 2020-09-16 RX ADMIN — ONDANSETRON 4 MG: 2 INJECTION INTRAMUSCULAR; INTRAVENOUS at 10:00

## 2020-09-16 RX ADMIN — BUPIVACAINE 20 ML: 13.3 INJECTION, SUSPENSION, LIPOSOMAL INFILTRATION at 10:55

## 2020-09-16 RX ADMIN — SENNOSIDES AND DOCUSATE SODIUM 1 TABLET: 8.6; 5 TABLET ORAL at 20:11

## 2020-09-16 RX ADMIN — ACETAMINOPHEN 975 MG: 325 TABLET, FILM COATED ORAL at 13:47

## 2020-09-16 RX ADMIN — EPHEDRINE SULFATE 10 MG: 50 INJECTION, SOLUTION INTRAVENOUS at 09:51

## 2020-09-16 RX ADMIN — Medication 100 ML/HR: at 11:30

## 2020-09-16 RX ADMIN — BUPIVACAINE 20 ML: 13.3 INJECTION, SUSPENSION, LIPOSOMAL INFILTRATION at 10:56

## 2020-09-16 RX ADMIN — SODIUM CITRATE AND CITRIC ACID MONOHYDRATE 30 ML: 500; 334 SOLUTION ORAL at 08:38

## 2020-09-16 RX ADMIN — BUPIVACAINE HYDROCHLORIDE IN DEXTROSE 10 MG: 7.5 INJECTION, SOLUTION SUBARACHNOID at 09:39

## 2020-09-16 RX ADMIN — SENNOSIDES AND DOCUSATE SODIUM 1 TABLET: 8.6; 5 TABLET ORAL at 13:47

## 2020-09-16 RX ADMIN — PHENYLEPHRINE HYDROCHLORIDE 0.5 MCG/KG/MIN: 10 INJECTION INTRAVENOUS at 09:41

## 2020-09-16 RX ADMIN — PHENYLEPHRINE HYDROCHLORIDE 100 MCG: 10 INJECTION INTRAVENOUS at 09:46

## 2020-09-16 RX ADMIN — MORPHINE SULFATE 0.15 MG: 1 INJECTION, SOLUTION EPIDURAL; INTRATHECAL; INTRAVENOUS at 09:39

## 2020-09-16 RX ADMIN — FENTANYL CITRATE 15 MCG: 50 INJECTION, SOLUTION INTRAMUSCULAR; INTRAVENOUS at 09:39

## 2020-09-16 RX ADMIN — FENTANYL CITRATE 85 MCG: 50 INJECTION, SOLUTION INTRAMUSCULAR; INTRAVENOUS at 10:00

## 2020-09-16 RX ADMIN — ACETAMINOPHEN 975 MG: 325 TABLET, FILM COATED ORAL at 20:11

## 2020-09-16 RX ADMIN — CEFAZOLIN SODIUM 2 G: 2 INJECTION, SOLUTION INTRAVENOUS at 08:38

## 2020-09-16 RX ADMIN — KETOROLAC TROMETHAMINE 30 MG: 30 INJECTION, SOLUTION INTRAMUSCULAR at 13:47

## 2020-09-16 RX ADMIN — PHENYLEPHRINE HYDROCHLORIDE 100 MCG: 10 INJECTION INTRAVENOUS at 09:40

## 2020-09-16 RX ADMIN — OXYTOCIN 5 UNITS: 10 INJECTION, SOLUTION INTRAMUSCULAR; INTRAVENOUS at 10:00

## 2020-09-16 RX ADMIN — EPHEDRINE SULFATE 10 MG: 50 INJECTION, SOLUTION INTRAVENOUS at 09:47

## 2020-09-16 RX ADMIN — KETOROLAC TROMETHAMINE 30 MG: 30 INJECTION, SOLUTION INTRAMUSCULAR at 20:11

## 2020-09-16 RX ADMIN — LIDOCAINE HYDROCHLORIDE 3 ML: 10 INJECTION, SOLUTION EPIDURAL; INFILTRATION; INTRACAUDAL; PERINEURAL at 09:39

## 2020-09-16 RX ADMIN — PHENYLEPHRINE HYDROCHLORIDE 100 MCG: 10 INJECTION INTRAVENOUS at 09:43

## 2020-09-16 NOTE — ANESTHESIA PREPROCEDURE EVALUATION
Anesthesia Pre-Procedure Evaluation    Patient: María Briones   MRN: [id number] : 1980          Preoperative Diagnosis: H/O:  [Z98.891]  Prenatal care, subsequent pregnancy in third trimester [Z34.83]    Procedure(s):   SECTION  SALPINGECTOMY    Past Medical History:   Diagnosis Date     Chickenpox      Incisional hernia, without obstruction or gangrene 10/10/2019     Palpitations 2008     Urinary tract bacterial infections     as chold     Past Surgical History:   Procedure Laterality Date     C ORAL SURGERY PROCEDURE      wisdom teeth      SECTION N/A 10/12/2015    Procedure:  SECTION;  Surgeon: Jayshree Garcia MD;  Location: [facility]      SECTION N/A 2018    Procedure:  Section;  Surgeon: Jayshree Garcia MD;  Location: [facility]     HERNIORRHAPHY INCISIONAL (LOCATION) N/A 2019    Procedure: Incisional hernia repair;  Surgeon: Tani Sinclair DO;  Location: [facility]     LAPAROSCOPIC ABLATION ENDOMETRIOSIS  4/15/2014    Procedure: LAPAROSCOPIC ABLATION ENDOMETRIOSIS;  Surgeon: Mohamud Osorio MD;  [facility]     LAPAROSCOPIC TUBAL DYE STUDY  4/15/2014    Procedure: LAPAROSCOPIC TUBAL DYE STUDY;  Surgeon: Mohamud Osorio MD;  [facility]     LAPAROSCOPY DIAGNOSTIC (GYN)  4/15/2014    Procedure: Operative Laparoscopy, Excision Cauterization Of Endometriosis, Chromopertubation ;  Surgeon: Mohamud Osorio MD;  [facility]       Anesthesia Evaluation     . Pt has had prior anesthetic. Type: General and Regional    No history of anesthetic complications          ROS/MED HX    ENT/Pulmonary:  - neg pulmonary ROS     Neurologic:  - neg neurologic ROS     Cardiovascular: Comment: Palpitations         METS/Exercise Tolerance:  >4 METS   Hematologic:  - neg hematologic  ROS       Musculoskeletal:  - neg musculoskeletal ROS       GI/Hepatic:  - neg GI/hepatic ROS       Renal/Genitourinary:  - ROS Renal section negative       Endo:    "  (+) thyroid problem hypothyroidism, .      Psychiatric:  - neg psychiatric ROS       Infectious Disease:  - neg infectious disease ROS       Malignancy:      - no malignancy   Other:    - neg other ROS                      Physical Exam  Normal systems: cardiovascular, pulmonary and dental    Airway   Mallampati: I  TM distance: >3 FB  Neck ROM: full    Dental     Cardiovascular   Rhythm and rate: regular and normal      Pulmonary    breath sounds clear to auscultation            Lab Results   Component Value Date    WBC 10.1 06/18/2020    HGB 11.0 (L) 09/16/2020    HCT 31.2 (L) 06/18/2020     06/18/2020    GLC 90 10/24/2016    TSH 0.86 01/29/2020    T4 1.04 01/29/2020    HCG Negative 11/18/2019       Preop Vitals  BP Readings from Last 3 Encounters:   09/16/20 118/52   09/08/20 120/68   09/01/20 139/73    Pulse Readings from Last 3 Encounters:   09/16/20 86   09/01/20 79   08/20/20 82      Resp Readings from Last 3 Encounters:   09/08/20 18   09/01/20 16   08/20/20 16    SpO2 Readings from Last 3 Encounters:   12/19/19 100%   11/18/19 97%   09/27/19 100%      Temp Readings from Last 1 Encounters:   09/16/20 35.6  C (96  F) (Oral)    Ht Readings from Last 1 Encounters:   09/08/20 1.6 m (5' 3\")      Wt Readings from Last 1 Encounters:   09/16/20 60.4 kg (133 lb 3.2 oz)    Estimated body mass index is 23.6 kg/m  as calculated from the following:    Height as of 9/8/20: 1.6 m (5' 3\").    Weight as of this encounter: 60.4 kg (133 lb 3.2 oz).       Anesthesia Plan      History & Physical Review  History and physical reviewed and following examination; no interval change.    ASA Status:  2 .    NPO Status:  > 8 hours    Plan for Spinal and Peripheral Nerve Block   PONV prophylaxis:  Ondansetron (or other 5HT-3)  SAB with astromporh, bilateral TAP block with exparel fro post-op pain control/ ERAS protocol        Postoperative Care  Postoperative pain management:  Multi-modal analgesia and Peripheral nerve block " (Single Shot).      Consents  Anesthetic plan, risks, benefits and alternatives discussed with:  Patient..                 Marko Negro CRNA, APRN CRNA

## 2020-09-16 NOTE — ANESTHESIA POSTPROCEDURE EVALUATION
Patient: María Briones    Procedure(s):   SECTION  SALPINGECTOMY    Diagnosis:H/O:  [Z98.891]  Prenatal care, subsequent pregnancy in third trimester [Z34.83]  Diagnosis Additional Information: No value filed.    Anesthesia Type:  Spinal, Peripheral Nerve Block    Note:  Anesthesia Post Evaluation    Patient location during evaluation: Phase 2 and Bedside  Patient participation: Able to participate in evaluation but full recovery from regional anesthesia has not yet ocurrred but is anticipated to occur within 48 hours  Level of consciousness: awake and alert  Pain management: adequate  Airway patency: patent  Cardiovascular status: acceptable  Respiratory status: acceptable  Hydration status: acceptable  PONV: none     Anesthetic complications: None          Last vitals:  Vitals:    20 0750 20 1100 20 1125   BP: 118/52 90/56 107/47   Pulse: 86  63   Temp: 35.6  C (96  F)  36.3  C (97.4  F)   SpO2:  97% 96%         Electronically Signed By: Marko Negro CRNA, APRN CRNA  2020  11:38 AM

## 2020-09-16 NOTE — PLAN OF CARE
Began recovery at 1100 in her postpartum room  she has been stable but has been nauseated intermittently  she declines med for it  she ate some of her regular tray then later had an emesis but thinks she ate too fast  breastfeeding independently and has been bonding well with her family.

## 2020-09-16 NOTE — L&D DELIVERY NOTE
"Delivery Summary    María Briones MRN# [id number]   Age: 40 year old YOB: 1980     ASSESSMENT & PLAN: María Briones is a 40 year old  @ 39w2d here for repeat  and tubal sterilization    Viable male, vertex, 8#14, Apgars 8/9  Clear fluid, nuchal cord x 1, placenta 3vc  Normal uterus/tubes/ovaries  Mother and infant stable.    ml   No complications.     Jayshree Garcia M.D.        Delivery/Placenta Date and Time    Delivery Date:  20 Delivery Time:   9:58 AM   Placenta Date/Time:  2020  9:43 AM     Apgars    Living status:  Living   1 Minute 5 Minute 10 Minute 15 Minute 20 Minute   Skin color: 0  1       Heart rate: 2  2       Reflex irritability: 2  2       Muscle tone: 2  2       Respiratory effort: 2  2       Total: 8  9       Apgars assigned by:  BZ     Cord    Vessels:  3 Vessels Complications:  None   Cord Blood Disposition:  Lab Gases Sent?:  No      [place] Resuscitation    Methods:  None      Measurements    Weight:  8 lb 13.8 oz Length:  1' 8.5\"   Head circumference:  36.8 cm       Skin to Skin and Feeding Plan    Skin to skin initiation date/time: 1841    Skin to skin with:  Mother  Skin to skin end date/time:     How do you plan to feed your baby:  Breastfeeding     Labor Events and Shoulder Dystocia    Fetal Tracing Prior to Delivery:  Category 1  Shoulder dystocia present?:  Neg     Delivery (Maternal) (Provider to Complete) ([id number])    Episiotomy:  None     Blood Loss  Mother: María Briones #[id number]   Start of Mother's Information    IO Blood Loss  20 0952 - 20 1122    Total Surgical QBL Blood Loss (mL) Hospital Encounter 239 mL    Total  239 mL         End of Mother's Information  Mother: María Briones #[id number]         Delivery - Provider to Complete ([id number])    Delivering clinician:  Jayshree Garcia MD  Delivery Type (Choose the 1 that will go to the Birth History):  , Low Transverse   Other personnel:   Provider " Role   Frikke, Natalie, NP Nurse Practitioner   Tonya Dyson, RN Delivery Nurse   Citlaly Finn, RN Scrub Nurse   Mari Jackson Reed E, APRN CRNA Certified Registered Nurse Anesthesiologist   Paige Heart MD Assistant Surgeon         Placenta    Delayed Cord Clamping:  Done  Date/Time:  9/16/2020  9:43 AM  Removal:  Expressed  Disposition:  Hospital disposal     Presentation and Position    Presentation:  Vertex          Jayshree Garcia MD

## 2020-09-16 NOTE — OP NOTE
Op Note    Preop Dx: 1. 39w2d intrauterine pregnancy , 2. Prior , 3. Desires sterilization    Postop DX: 1. 39w2d intrauterine pregnancy , 2. Prior , 3. Desires sterilization    Procedure: repeat low transverse  section with postpartum tubal ligation via salpingectomy    Surgeon: Jayshree Garcia M.D.     Assist: Paige Heart MD  (A surgical assistant was required for this surgery for her experience with retraction, achievement of hemostasis, and wound closure)    Anesthesia: spinal    FRA Score: 2    EBL: 239 cc    IVF: see anesthesia records    UOP: see anesthesia records    Complications: none    Findings: viable infant male in vertex presentation weighing 8 lb 14oz with Apgars of  8 at 1 minute and 9 at 5 minutes.  Clear amniotic fluid.  Placenta with 3 vessel cord.  Normal uterus, tubes and ovaries.    Indications: María Briones is a 40 year old  3 Para  who [facility] and 2/7 weeks for repeat  section with tubal sterilization due to prior  and desire for sterilization.  Patient understood risks and benefits including risks of bleeding, infection and damage to surrounding structures.  Patient understands risk of regret regarding her tubal sterilization and is certain they are done with childbearing.  Patient agreed to proceed.     Procedure: Patient was brought to the operating room and spinal anesthesia was found to be adequate.  She was prepped and draped in the usual sterile fashion in the dorsal supine position with a leftward tilt.  A pfannensteil incision was created in the usual fashion.  An Otis-O retractor was placed in the incision and the lower uterine segment well visualized.  The lower uterine segment was incised sharply and transversely with the scalpel.  Clear amniotic fluid was noted.  The infant vertexu was brought out of the uterine incision without difficulty.  The rest of the infant was delivered without  difficulty.  The cord was clamped and cut.  The infant was handed off to the waiting nurses.  Cord blood was obtained.  The placenta was expressed intact with a 3 vessel cord.  The uterus was exteriorized and cleared of all clot and debris.  The uterine incision was repaired with 0-Vicryl in a running fashion followed by a second layer of 0-Monocryl in a imbricated fashion.  The fallopian tubes were followed out to the fimbriated ends bilaterally.  The mesosalpinx was sequentially desiccated and transected bilaterally.  The tubes were amputated at the cornua bilaterally.  The tubes were sent to pathology for further evaluation.   There was an area on the posterior fundus that was oozing and this was oversewn with 3-0 vicryl in a running fashion.  FloSeal was also applied to any areas of oozing.  Excellent hemostasis was noted.  The uterus was returned to the abdomen.  The gutters were cleared of all clot and debris.  Reinspection of the incision revealed excellent hemostasis.  The Otis-O was removed.  The peritoneum was repaired with 2-0 vicryl in a running fashion.   The fascia was repaired with 0-Vicryl in a running fashion.   The subcutaneous tissue was repaired with 3-0 Plain in a running fashion.   The skin was closed with 4-0 V-Domonique-90 in a subcuticular fashion.   The incision was dressed with Secure Seal skin adhesive.      Patient tolerated procedure well.  Sponge, lap and needle counts are correct.  I was present for the entire procedure.  Patient was taken to her recovery room in stable condition.  Lachelle-operative antibiotics were given.  A surgical assistant was required for this surgery for her experience with retraction, achievement of hemostasis, and wound closure.

## 2020-09-16 NOTE — PLAN OF CARE
Patient was admitted today at 0730 for a scheduled  section and tubal ligation  she was oriented to her room and POC reviewed  she was prepped for surgery and is ready to go.  Fhr tracing was reactive and cat 1  she ambulated to the OR at 0933

## 2020-09-16 NOTE — ANESTHESIA CARE TRANSFER NOTE
Patient: María Briones    Procedure(s):   SECTION  SALPINGECTOMY    Diagnosis: H/O:  [Z98.891]  Prenatal care, subsequent pregnancy in third trimester [Z34.83]  Diagnosis Additional Information: No value filed.    Anesthesia Type:   Spinal, Peripheral Nerve Block     Note:  Airway :Room Air          Vitals: (Last set prior to Anesthesia Care Transfer)    CRNA VITALS  2020 1027 - 2020 1108      2020             Pulse:  60    SpO2:  100 %                Electronically Signed By: Marko Negro CRNA, APRN CRNA  2020  11:08 AM

## 2020-09-16 NOTE — ANESTHESIA PROCEDURE NOTES
Procedure note : TAP      Staff -   CRNA: Marko Negro APRN CRNA  Performed By: CRNA  Pre-Procedure    Location: OR      Pre-Anesthestic Checklist: patient identified, risks and benefits discussed, informed consent, pre-op evaluation and post-op pain management    Timeout  Correct Patient: Yes   Correct Procedure: Yes   Correct Site: Yes   Correct Laterality: N/A   Correct Position: Yes   Site Marked: N/A   .   Procedure Documentation    .    Procedure: TAP, bilateral.   Patient Position:supine   Ultrasound used to identify targeted nerve, plexus, or vascular marker and placed a needle adjacent to it., Ultrasound was used to visualize the spread of the anesthetic in close proximity to the above stated nerve. A permanent image is entered into the patient's record.  Patient Prep/Sterile Barriers; mask, sterile gloves, chlorhexidine gluconate and isopropyl alcohol, patient draped.  .  Needle: short bevel   Needle Gauge: 21.    Needle Length (Inches) 4   Insertion Method: Single Shot.        Assessment/Narrative  Paresthesias: No.  Injection made incrementally with aspirations every 5 mL..  The placement was negative for: blood aspirated and painful injection.  Bolus given via needle..   Secured via.   Complications: none.

## 2020-09-16 NOTE — ANESTHESIA PROCEDURE NOTES
Procedure note : intrathecal      Staff -   CRNA: Marko Negro APRN CRNA  Performed By: CRNA  Pre-Procedure    Location: OR      Pre-Anesthestic Checklist: patient identified, IV checked, site marked, risks and benefits discussed, informed consent, monitors and equipment checked, pre-op evaluation, at physician/surgeon's request and post-op pain management    Timeout  Correct Patient: Yes   Correct Procedure: Yes   Correct Site: Yes   Correct Laterality: N/A   Correct Position: Yes   Site Marked: N/A   .   Procedure Documentation  ASA 2  .    Procedure: intrathecal, .   Patient Position:sitting Insertion Site:L4-5  (midline approach)     Patient Prep/Sterile Barriers; mask, sterile gloves, povidone-iodine 7.5% surgical scrub, patient draped.  .  Needle:  Spinal Needle (gauge): 25  Spinal/LP Needle Length (inches): 3.5 # of attempts: 1 and # of redirects:  Introducer used .        Assessment/Narrative  Paresthesias: No.  .  .  clear CSF fluid removed . Sensory Level: T6

## 2020-09-16 NOTE — BRIEF OP NOTE
[facility] Brief Operative Note    Pre-operative diagnosis: H/O:  [Z98.891]  Prenatal care, subsequent pregnancy in third trimester [Z34.83]   Post-operative diagnosis s/p  and tubal sterilization     Procedure: Procedure(s):   SECTION  SALPINGECTOMY as a method for sterilization   Surgeon(s): Surgeon(s) and Role:     * Jayshree Garcia MD - Primary     * Paige Heart MD - Assisting   Estimated blood loss: 239 ml     Specimens: ID Type Source Tests Collected by Time Destination   A : Bilateral Fallopian Tubes Tissue Fallopian Tube, Bilateral SURGICAL PATHOLOGY EXAM Jayshree Garcia MD 2020 10:10 AM       Findings: Viable male, vertex, 8#14, Apgars 8/9  Clear fluid, nuchal cord x 1, placenta 3vc  Normal uterus/tubes/ovaries

## 2020-09-17 LAB
HGB BLD-MCNC: 10 G/DL (ref 11.7–15.7)
T PALLIDUM AB SER QL: NONREACTIVE

## 2020-09-17 PROCEDURE — 85018 HEMOGLOBIN: CPT | Performed by: OBSTETRICS & GYNECOLOGY

## 2020-09-17 PROCEDURE — 36415 COLL VENOUS BLD VENIPUNCTURE: CPT | Performed by: OBSTETRICS & GYNECOLOGY

## 2020-09-17 PROCEDURE — 12000000 ZZH R&B MED SURG/OB

## 2020-09-17 PROCEDURE — 25000128 H RX IP 250 OP 636: Performed by: OBSTETRICS & GYNECOLOGY

## 2020-09-17 PROCEDURE — 25000132 ZZH RX MED GY IP 250 OP 250 PS 637: Performed by: OBSTETRICS & GYNECOLOGY

## 2020-09-17 RX ADMIN — IBUPROFEN 800 MG: 800 TABLET ORAL at 14:34

## 2020-09-17 RX ADMIN — ACETAMINOPHEN 975 MG: 325 TABLET, FILM COATED ORAL at 14:33

## 2020-09-17 RX ADMIN — SENNOSIDES AND DOCUSATE SODIUM 1 TABLET: 8.6; 5 TABLET ORAL at 08:13

## 2020-09-17 RX ADMIN — ACETAMINOPHEN 975 MG: 325 TABLET, FILM COATED ORAL at 20:20

## 2020-09-17 RX ADMIN — ACETAMINOPHEN 975 MG: 325 TABLET, FILM COATED ORAL at 08:14

## 2020-09-17 RX ADMIN — PRENATAL VIT W/ FE FUMARATE-FA TAB 27-0.8 MG 1 TABLET: 27-0.8 TAB at 08:14

## 2020-09-17 RX ADMIN — IBUPROFEN 800 MG: 800 TABLET ORAL at 20:20

## 2020-09-17 RX ADMIN — ACETAMINOPHEN 975 MG: 325 TABLET, FILM COATED ORAL at 02:18

## 2020-09-17 RX ADMIN — IBUPROFEN 800 MG: 800 TABLET ORAL at 08:14

## 2020-09-17 RX ADMIN — SENNOSIDES AND DOCUSATE SODIUM 1 TABLET: 8.6; 5 TABLET ORAL at 20:20

## 2020-09-17 RX ADMIN — KETOROLAC TROMETHAMINE 30 MG: 30 INJECTION, SOLUTION INTRAMUSCULAR at 02:19

## 2020-09-17 NOTE — PLAN OF CARE
Assessment is WNL except there is erythema around her incision.  Eating at least 50%, ambulated in combs to nutrition room.  Voided 600 ml without difficulty.  Continue to monitor incision.

## 2020-09-17 NOTE — PLAN OF CARE
Data: Vital signs within normal limits. Postpartum checks within normal limits - see flow record. Patient eating and drinking normally. Patient able to empty bladder independently. . Patient ambulating independently..   No apparent signs of infection. Incision healing well. Patient is performing self cares and is able to care for infant. Positive attachment behaviors are observed with infant. Support persons are present.  Action:  Pain plan was discussed. Patient will request pain med when she is ready for it. Patient was medicated during the shift as prescribed. See MAR.Patient education done about rest. See flow record.  Response:   Patient reassessed within 1 hour after each medication for pain. Patient stated that pain had improved. Patient stated that she was comfortable. .   Plan: Anticipate discharge on 9/18.

## 2020-09-17 NOTE — PROGRESS NOTES
PPD # 1    S: patient without complaints.  Ambulating, voiding, and passing flatus.  Pain controlled with ibuprofen and tylenol.   O: /56   Pulse 78   Temp 98.6  F (37  C)   Resp 16   Wt 60.4 kg (133 lb 3.2 oz)   LMP 2019   SpO2 98%   Breastfeeding Unknown   BMI 23.60 kg/m     NAD  Abd: soft, nontender, fundus firm  Inc: clean and intact  Ext: calves nontender    Component      Latest Ref Rng & Units 2020   Hemoglobin      11.7 - 15.7 g/dL 11.0 (L) 10.0 (L)       A/P PPD # 1 s/p low transverse  section     Routine PP care.    Jayshree Garcia M.D.

## 2020-09-17 NOTE — ADDENDUM NOTE
Addendum  created 09/17/20 1640 by Marko Negro APRN CRNA    Child order released for a procedure order, Clinical Note Signed, Intraprocedure Blocks edited, Order Canceled from Note

## 2020-09-18 VITALS
HEART RATE: 72 BPM | TEMPERATURE: 98.3 F | DIASTOLIC BLOOD PRESSURE: 70 MMHG | RESPIRATION RATE: 16 BRPM | SYSTOLIC BLOOD PRESSURE: 117 MMHG | WEIGHT: 133.2 LBS | OXYGEN SATURATION: 98 % | BODY MASS INDEX: 23.6 KG/M2

## 2020-09-18 LAB — COPATH REPORT: NORMAL

## 2020-09-18 PROCEDURE — 25000132 ZZH RX MED GY IP 250 OP 250 PS 637: Performed by: OBSTETRICS & GYNECOLOGY

## 2020-09-18 RX ORDER — OXYCODONE HYDROCHLORIDE 5 MG/1
5 TABLET ORAL EVERY 4 HOURS PRN
Qty: 20 TABLET | Refills: 0 | Status: SHIPPED | OUTPATIENT
Start: 2020-09-18 | End: 2020-10-29

## 2020-09-18 RX ADMIN — IBUPROFEN 800 MG: 800 TABLET ORAL at 07:56

## 2020-09-18 RX ADMIN — ACETAMINOPHEN 975 MG: 325 TABLET, FILM COATED ORAL at 02:25

## 2020-09-18 RX ADMIN — PRENATAL VIT W/ FE FUMARATE-FA TAB 27-0.8 MG 1 TABLET: 27-0.8 TAB at 07:56

## 2020-09-18 RX ADMIN — IBUPROFEN 800 MG: 800 TABLET ORAL at 02:25

## 2020-09-18 RX ADMIN — SENNOSIDES AND DOCUSATE SODIUM 1 TABLET: 8.6; 5 TABLET ORAL at 07:56

## 2020-09-18 RX ADMIN — ACETAMINOPHEN 975 MG: 325 TABLET, FILM COATED ORAL at 07:56

## 2020-09-18 NOTE — DISCHARGE SUMMARY
[facility]  Delivery Discharge Summary    Admit date: 2020  Discharge date: 2020     Admit Dx:   - 40 year old y/o  at 39w2d  - History of  section, desiring repeat  - AMA  - circumvallate placenta    Discharge Dx:  - Same as above    Procedures:  - Repeat low transverse  section with double layer closure via Pfannenstiel incision, bilateral salpingectomy   - Spinal analgesia    Admit HPI:  María Briones is a 40 year old  3 Para  who [facility] 39 and 2/7 weeks for repeat  section with tubal sterilization due to prior  and desire for sterilization.  Patient understood risks and benefits including risks of bleeding, infection and damage to surrounding structures.  Patient understands risk of regret regarding her tubal sterilization and is certain they are done with childbearing.  Patient agreed to proceed.   Please see her admit H&P for full details of her PMH, PSH, Meds, Allergies and exam on admit.    Her postoperative course was uncomplicated. On POD#2, she was meeting all of her postpartum goals and deemed stable for discharge. She was voiding without difficulty, tolerating a regular diet without nausea and vomiting, her pain was well controlled on oral pain medicines and her lochia was appropriate. Her hemoglobin after delivery was 10.0. Her Rh status was POS and Rhogam was not indicated. At the time of discharge, she was breastfeeding her infant and s/p tubal ligation for contraception.     Physical exam on the day of discharge:  Vitals:    20 0809 20 1710 20 0000 20 0800   BP: 116/56 99/48 113/62 117/70   Pulse: 78 72 69 72   Resp:  16 16    Temp: 98.6  F (37  C) 98.2  F (36.8  C) 98.3  F (36.8  C) 98.3  F (36.8  C)   TempSrc:  Oral Oral Oral   SpO2:       Weight:           General: sitting up, alert and cooperative  Abd: soft, non-distended, non-tender. Fundus firm, nontender  well below umbilicus.   Incision clean/dry/intact with dermabond in place.  Extremities: calves nontender, no edema of lower extremities bilaterally    Lab Results   Component Value Date    HGB 10.0 2020    HGB 11.0 2020     Blood type:   Lab Results   Component Value Date    RH Pos 2020       Discharge/Disposition:  María Briones was discharged to home in stable condition with the following instructions/medications:  1) Call for temperature > 100.4, foul smelling vaginal discharge, bleeding > 1 pad per hour x 2 hrs, pain not controlled by oral pain meds, severe constipation or severe nausea or vomiting.  2) She received contraceptive counseling. S/p tubal ligation.  3) She was instructed to follow-up with her primary OB in 6 weeks for a routine postpartum visit.  4) She was instructed to continue her PNV on discharge if she wished to breast feed her infant.  5) She was discharged home with the following medications:      Review of your medicines      START taking      Dose / Directions   oxyCODONE 5 MG tablet  Commonly known as:  ROXICODONE  Used for:  Single delivery by       Dose:  5 mg  Take 1 tablet (5 mg) by mouth every 4 hours as needed for breakthrough pain  Quantity:  20 tablet  Refills:  0        CONTINUE these medicines which have NOT CHANGED      Dose / Directions   IRON PO      Refills:  0     prenatal multivitamin w/iron 27-0.8 MG tablet      Dose:  1 tablet  Take 1 tablet by mouth daily  Refills:  0     triamcinolone 0.1 % external cream  Commonly known as:  KENALOG  Used for:  Psoriasis      Apply twice daily sparingly to affected are on elbow, knees as needed.  Quantity:  80 g  Refills:  4           Where to get your medicines      Some of these will need a paper prescription and others can be bought over the counter. Ask your nurse if you have questions.    Bring a paper prescription for each of these medications    oxyCODONE 5 MG tablet         Kristine Vázquez  MD [facility]

## 2020-09-18 NOTE — PLAN OF CARE
Data: Vital signs within normal limits. Postpartum checks within normal limits - see flow record. Patient eating and drinking normally. Patient able to empty bladder independently. . Patient ambulating independently..   No apparent signs of infection. Incision healing well. Patient Is performing self cares and is able to care for infant. Positive attachment behaviors are observed with infant. Support persons are present.  Action:  Pain plan was discussed. Patient would like pain meds to be brought in when they are due. Patient was medicated during the shift for pain. See MAR.Patient education done about rest and discharge from hospital. See flow record.  Response:   Patient reassessed within 1 hour after each medication for pain. Patient stated that pain had improved. Patient stated that she was comfortable. .   Plan: Anticipate discharge on 9/18.

## 2020-09-18 NOTE — PLAN OF CARE
Data: Vital signs within normal limits. Postpartum checks within normal limits - see flow record. Patient eating and drinking normally. Patient able to empty bladder independently. . Patient ambulating independently..   No apparent signs of infection. Incision healing well. Patient Is performing self cares and Is able to care for infant. Positive attachment behaviors are observed with infant. Support persons are present.  Action:  Pain plan was discussed. Patient would like pain meds to be brought in when they are due. Patient was medicated during the shift for pain. See MAR.Patient education done about  cares, postpartum cares, and discharge from hospital. See flow record.  Response:   Patient reassessed within 1 hour after each medication for pain. Patient stated that pain had improved. Patient stated that she was comfortable. .   Plan: Anticipate discharge today.   Patient discharged per ambulatory with infant in car seat. Mother verified that her band matches her infant's band by comparing the infant's  MR#.  Discharge instructions given. Encouraged to call for any problems, questions or concerns. RXs not picked up per patient request.

## 2020-09-18 NOTE — DISCHARGE INSTRUCTIONS
Postop  Birth Instructions    Activity       Do not lift more than 10 pounds for 6 weeks after surgery.  Ask family and friends for help when you need it.    No driving until you have stopped taking your pain medications (usually two weeks after surgery).    No heavy exercise or activity for 6 weeks.  Don't do anything that will put a strain on your surgery site.    Don't strain when using the toilet.  Your care team may prescribe a stool softener if you have problems with your bowel movements.     To care for your incision:       Keep the incision clean and dry.    Do not soak your incision in water. No swimming or hot tubs until it has fully healed. You may soak in the bathtub if the water level is below your incision.    Do not use peroxide, gel, cream, lotion, or ointment on your incision.    Adjust your clothes to avoid pressure on your surgery site (check the elastic in your underwear for example).     You may see a small amount of clear or pink drainage and this is normal.  Check with your health care provider:       If the drainage increases or has an odor.    If the incision reddens, you have swelling, or develop a rash.    If you have increased pain and the medicine we prescribed doesn't help.    If you have a fever above 100.4 F (38 C) with or without chills when placing thermometer under your tongue.   The area around your incision (surgery wound), will feel numb.  This is normal. The numbness should go away in less than a year.     Keep your hands clean:  Always wash your hands before touching your incision (surgery wound). This helps reduce your risk of infection. If your hands aren't dirty, you may use an alcohol hand-rub to clean your hands. Keep your nails clean and short.    Call your healthcare provider if you have any of these symptoms:       You soak a sanitary pad with blood within 1 hour, or you see blood clots larger than a golf ball.    Bleeding that lasts more than 6  weeks.    Vaginal discharge that smells bad.    Severe pain, cramping or tenderness in your lower belly area.    A need to urinate more frequently (use the toilet more often), more urgently (use the toilet very quickly), or it burns when you urinate.    Nausea and vomiting.    Redness, swelling or pain around a vein in your leg.    Problems breastfeeding or a red or painful area on your breast.    Chest pain and cough or are gasping for air.    Problems with coping with sadness, anxiety or depression. If you have concerns about hurting yourself or the baby, call your provider immediately.      You have questions or concerns after you return home.   If you you feel sad, have difficulty sleeping, feel overwhelmed, anxious or have troubling thoughts you may call your clinic, or the HelpLine for Pregnancy & Postpartum Support [place]. (PPS HelpLine 790-502-6187) or online resource list  @ www.ppsupport[place].org    For problems or questions with breastfeeding after discharge call: [phone] at the Peds clinic.  After hours you may leave a message and your call will returned the next morning. You may also call the [facility] to answer questions, [phone].  If you have concerns/questions after returning home, contact the nurse triage line [phone] or your primary care clinic [phone]

## 2020-09-18 NOTE — PLAN OF CARE
Data: Vital signs within normal limits. Postpartum checks within normal limits - see flow record. Patient eating and drinking normally. Patient able to empty bladder independently and is up ambulating. No apparent signs of infection. Incision healing well. Patient performing self cares and is able to care for infant.  Action: Patient medicated during the shift for pain and cramping. See MAR. Patient reassessed within 1 hour after each medication and pain was improved - patient stated she was comfortable. Patient education done about pain management, discharge home. See flow record.  Response: Positive attachment behaviors observed with infant. Support persons 1 present.   Plan: Anticipate discharge on 9/18/2020.

## 2020-10-16 ENCOUNTER — MEDICAL CORRESPONDENCE (OUTPATIENT)
Dept: HEALTH INFORMATION MANAGEMENT | Facility: CLINIC | Age: 40
End: 2020-10-16

## 2020-10-29 ENCOUNTER — PRENATAL OFFICE VISIT (OUTPATIENT)
Dept: OBGYN | Facility: CLINIC | Age: 40
End: 2020-10-29
Payer: COMMERCIAL

## 2020-10-29 VITALS
BODY MASS INDEX: 21.62 KG/M2 | SYSTOLIC BLOOD PRESSURE: 124 MMHG | DIASTOLIC BLOOD PRESSURE: 78 MMHG | HEIGHT: 63 IN | RESPIRATION RATE: 16 BRPM | WEIGHT: 122 LBS | HEART RATE: 60 BPM | TEMPERATURE: 98.2 F

## 2020-10-29 PROBLEM — Z98.891 H/O: C-SECTION: Status: RESOLVED | Noted: 2020-03-10 | Resolved: 2020-10-29

## 2020-10-29 PROBLEM — Z34.83 PRENATAL CARE, SUBSEQUENT PREGNANCY IN THIRD TRIMESTER: Status: RESOLVED | Noted: 2020-03-10 | Resolved: 2020-10-29

## 2020-10-29 PROCEDURE — 99207 PR POST PARTUM EXAM: CPT | Performed by: OBSTETRICS & GYNECOLOGY

## 2020-10-29 ASSESSMENT — ANXIETY QUESTIONNAIRES
1. FEELING NERVOUS, ANXIOUS, OR ON EDGE: NOT AT ALL
5. BEING SO RESTLESS THAT IT IS HARD TO SIT STILL: NOT AT ALL
3. WORRYING TOO MUCH ABOUT DIFFERENT THINGS: NOT AT ALL
7. FEELING AFRAID AS IF SOMETHING AWFUL MIGHT HAPPEN: NOT AT ALL
2. NOT BEING ABLE TO STOP OR CONTROL WORRYING: NOT AT ALL
IF YOU CHECKED OFF ANY PROBLEMS ON THIS QUESTIONNAIRE, HOW DIFFICULT HAVE THESE PROBLEMS MADE IT FOR YOU TO DO YOUR WORK, TAKE CARE OF THINGS AT HOME, OR GET ALONG WITH OTHER PEOPLE: NOT DIFFICULT AT ALL
GAD7 TOTAL SCORE: 1
6. BECOMING EASILY ANNOYED OR IRRITABLE: SEVERAL DAYS

## 2020-10-29 ASSESSMENT — PATIENT HEALTH QUESTIONNAIRE - PHQ9
SUM OF ALL RESPONSES TO PHQ QUESTIONS 1-9: 1
5. POOR APPETITE OR OVEREATING: NOT AT ALL

## 2020-10-29 ASSESSMENT — MIFFLIN-ST. JEOR: SCORE: 1197.52

## 2020-10-29 NOTE — PROGRESS NOTES
"María is here for a 6-week postpartum checkup.    She had a Repeat  of a liveborn baby boy, weight 8 pounds 14 oz.  The delivery was uncomplicated.  Since delivery, she has been breast feeding.  She has not had a normal menses.  She has not had intercourse.  Patient screened for postpartum depression and complaints are NEGATIVE. Screening has also been completed for intimate partner violence.    Her last pap was  and was Normal    EXAM: BP (!) 141/75 (BP Location: Right arm, Patient Position: Chair, Cuff Size: Adult Regular)   Pulse 71   Temp 98.2  F (36.8  C) (Tympanic)   Resp 16   Ht 1.6 m (5' 3\")   Wt 55.3 kg (122 lb)   LMP 2019   Breastfeeding No   BMI 21.61 kg/m      HEENT: grossly normal.  NECK: no lymphadenopathy or thyromegaly.  ABDOMEN: soft, non tender, good bowel sounds, without masses, rebound, guarding or tenderness.  INC: well healed   EXTREMITIES: Warm to touch, no ankle edema or calf tenderness.    PELVIC:   deferred    A/P  Routine Postpartum    1. Contraception: postpartum tubal ligation   2. Annual due     Jayshree Garcia M.D.   "

## 2020-10-30 ASSESSMENT — ANXIETY QUESTIONNAIRES: GAD7 TOTAL SCORE: 1

## 2020-11-16 ENCOUNTER — MEDICAL CORRESPONDENCE (OUTPATIENT)
Dept: HEALTH INFORMATION MANAGEMENT | Facility: CLINIC | Age: 40
End: 2020-11-16

## 2021-01-21 ENCOUNTER — MEDICAL CORRESPONDENCE (OUTPATIENT)
Dept: HEALTH INFORMATION MANAGEMENT | Facility: CLINIC | Age: 41
End: 2021-01-21

## 2021-03-04 ENCOUNTER — VIRTUAL VISIT (OUTPATIENT)
Dept: FAMILY MEDICINE | Facility: CLINIC | Age: 41
End: 2021-03-04
Payer: COMMERCIAL

## 2021-03-04 DIAGNOSIS — J40 BRONCHITIS: Primary | ICD-10-CM

## 2021-03-04 PROCEDURE — 99213 OFFICE O/P EST LOW 20 MIN: CPT | Mod: 95 | Performed by: FAMILY MEDICINE

## 2021-03-04 RX ORDER — GUAIFENESIN AND DEXTROMETHORPHAN HYDROBROMIDE 600; 30 MG/1; MG/1
1 TABLET, EXTENDED RELEASE ORAL EVERY 12 HOURS
Qty: 30 TABLET | Refills: 0 | Status: SHIPPED | OUTPATIENT
Start: 2021-03-04 | End: 2021-10-29

## 2021-03-04 RX ORDER — PREDNISONE 20 MG/1
40 TABLET ORAL DAILY
Qty: 10 TABLET | Refills: 0 | Status: SHIPPED | OUTPATIENT
Start: 2021-03-04 | End: 2021-03-09

## 2021-03-04 NOTE — PROGRESS NOTES
María is a 40 year old who is being evaluated via a billable video visit.      How would you like to obtain your AVS? MyChart  If the video visit is dropped, the invitation should be resent by: Send to e-mail at: [email or web address]  Will anyone else be joining your video visit? No    Video Start Time: 2:58 PM    Assessment & Plan     Bronchitis  Patient is likely a viral bronchitis.Patient reassured, recommend some prednisone and Mucinex, if by early next week her symptoms persist she is asked to call the clinic for consideration for antibiotics.  - predniSONE (DELTASONE) 20 MG tablet; Take 2 tablets (40 mg) by mouth daily for 5 days  - dextromethorphan-guaiFENesin (MUCINEX DM)  MG 12 hr tablet; Take 1 tablet by mouth every 12 hours    [id number]}     FUTURE APPOINTMENTS:       - Follow-up visit in one month.    Return in about 4 days (around 3/8/2021).    Mercy Rushing MD  [facility]   María is a 40 year old who presents for the following health issues .    HPI   Patient is a 40-year-old female presenting via telephone encounter for cough ongoing for about a week.  Cough is nonproductive.  She was diagnosed with COVID-19 at the end of January and had only loss of sense of taste and smell which she recovered very quickly from.  She then got her Covid vaccine both doses and has felt okay except for the cough that started about a week ago.  Cough is mostly dry, worse in the morning and gets better during the day.  She denies any shortness of breath or wheezing.  She has had no fevers or chills she has not felt tired or fatigued.  No contact with anyone with Covid that she is aware of.        Concern for COVID-19- tested positive 1/30/21. Received 2nd dose of vaccine 2/18/21  About how many days ago did these symptoms start? One week  Is this your first visit for this illness? No   How would you describe your symptoms since your last visit? My symptoms have  stayed the same  In the 14 days before your symptoms started, have you had close contact with someone with COVID-19 (Coronavirus)? I do not know.  Do you have a fever or chills? No  Are you having new or worsening difficulty breathing? No  Do you have new or worsening cough? Yes, I am coughing up mucus., in the morning and worsens through day  Have you had any new or unexplained body aches? No    Have you experienced any of the following NEW symptoms?    Headache: No    Sore throat: No    Loss of taste or smell: No    Chest pain: No    Diarrhea: No    Rash: No  What treatments have you tried? nothing  Who do you live with?  and 3 children  Are you, or a household member, a healthcare worker or a ? YES  Do you live in a nursing home, group home, or shelter? No  Do you have a way to get food/medications if quarantined? Yes, I have a friend or family member who can help me.              Review of Systems   Constitutional, HEENT, cardiovascular, pulmonary, gi and gu systems are negative, except as otherwise noted.      Objective           Vitals:  No vitals were obtained today due to virtual visit.    Physical Exam   GENERAL: Healthy, alert and no distress  EYES: Eyes grossly normal to inspection.  No discharge or erythema, or obvious scleral/conjunctival abnormalities.  RESP: No audible wheeze, cough, or visible cyanosis.  No visible retractions or increased work of breathing.    SKIN: Visible skin clear. No significant rash, abnormal pigmentation or lesions.  NEURO: Cranial nerves grossly intact.  Mentation and speech appropriate for age.  PSYCH: Mentation appears normal, affect normal/bright, judgement and insight intact, normal speech and appearance well-groomed.                Video-Visit Details    Type of service:  Video Visit    Video End Time:3:04 PM    Originating Location (pt. Location): Home    Distant Location (provider location):  [facility]     Platform used for  Video Visit: [employer]

## 2021-03-08 ENCOUNTER — MYC MEDICAL ADVICE (OUTPATIENT)
Dept: FAMILY MEDICINE | Facility: CLINIC | Age: 41
End: 2021-03-08

## 2021-03-08 DIAGNOSIS — J40 BRONCHITIS: Primary | ICD-10-CM

## 2021-03-08 RX ORDER — DOXYCYCLINE HYCLATE 100 MG
100 TABLET ORAL 2 TIMES DAILY
Qty: 14 TABLET | Refills: 0 | Status: SHIPPED | OUTPATIENT
Start: 2021-03-08 | End: 2021-10-29

## 2021-07-28 NOTE — PROGRESS NOTES
"CC: Here for routine prenatal visit @ 37w1d   HPI: + FM, no ctx, no LOF, no VB.  No complaints.     PE: /73 (BP Location: Right arm, Patient Position: Chair, Cuff Size: Adult Regular)   Pulse 79   Temp 98.8  F (37.1  C) (Tympanic)   Resp 16   Ht 1.6 m (5' 3\")   Wt 60.7 kg (133 lb 12.8 oz)   LMP 2019   Breastfeeding No   BMI 23.70 kg/m     See OB flowsheet    A/P  @ 37w1d normal pregnancy    1. Routine prenatal care.   with postpartum tubal ligation scheduled for 2020.    RTC 1 week for pre-op    Jayshree Garcia M.D.    "
172.7

## 2021-08-09 NOTE — NURSING NOTE
"Chief Complaint   Patient presents with     UTI       Initial /76 (BP Location: Right arm, Patient Position: Chair, Cuff Size: Adult Regular)  Pulse 60  Temp 98.1  F (36.7  C) (Tympanic)  Resp 18  Ht 5' 3.39\" (1.61 m)  Wt 104 lb (47.2 kg)  LMP  (LMP Unknown)  BMI 18.2 kg/m2 Estimated body mass index is 18.2 kg/(m^2) as calculated from the following:    Height as of this encounter: 5' 3.39\" (1.61 m).    Weight as of this encounter: 104 lb (47.2 kg).  Medication Reconciliation: complete    Health Maintenance that is potentially due pending provider review:  NONE    Antonia Marte MA  8:09 AM 3/1/2017  .    " Email sent to [facility]

## 2021-09-25 ENCOUNTER — HEALTH MAINTENANCE LETTER (OUTPATIENT)
Age: 41
End: 2021-09-25

## 2021-10-14 ENCOUNTER — OFFICE VISIT (OUTPATIENT)
Dept: DERMATOLOGY | Facility: CLINIC | Age: 41
End: 2021-10-14
Payer: COMMERCIAL

## 2021-10-14 VITALS — OXYGEN SATURATION: 100 % | SYSTOLIC BLOOD PRESSURE: 130 MMHG | HEART RATE: 63 BPM | DIASTOLIC BLOOD PRESSURE: 78 MMHG

## 2021-10-14 DIAGNOSIS — L82.1 SEBORRHEIC KERATOSIS: ICD-10-CM

## 2021-10-14 DIAGNOSIS — D22.9 MULTIPLE BENIGN NEVI: ICD-10-CM

## 2021-10-14 DIAGNOSIS — D18.01 CHERRY ANGIOMA: ICD-10-CM

## 2021-10-14 DIAGNOSIS — L81.4 LENTIGO: Primary | ICD-10-CM

## 2021-10-14 PROCEDURE — 99213 OFFICE O/P EST LOW 20 MIN: CPT | Performed by: PHYSICIAN ASSISTANT

## 2021-10-14 NOTE — LETTER
10/14/2021         RE: María Briones  [id number] [address] [place] [id number]        Dear Colleague,    Thank you for referring your patient, María Briones, to the [facility]. Please see a copy of my visit note below.    María Briones is an extremely pleasant 41 year old year old female patient here today for evaluation of moles on body. She denies any new or changing nevi. She notes she just had her third child last year. Patient has no other skin complaints today.  Remainder of the HPI, Meds, PMH, Allergies, FH, and SH was reviewed in chart.    Pertinent Hx:     Past Medical History:   Diagnosis Date     Chickenpox      Female infertility 2014    History of Stage III endometriosis by laparoscopy on 4/15/14  Prior Successful IVF      Incisional hernia, without obstruction or gangrene 10/10/2019     Palpitations 2008     Pes anserinus tendinitis or bursitis 10/24/2012     Urinary tract bacterial infections     as chold       Past Surgical History:   Procedure Laterality Date     C ORAL SURGERY PROCEDURE      wisdom teeth      SECTION N/A 10/12/2015    Procedure:  SECTION;  Surgeon: Jayshree Garcia MD;  Location: [facility]      SECTION N/A 2018    Procedure:  Section;  Surgeon: Jayshree Garcia MD;  Location: [facility]      SECTION N/A 2020    Procedure:  SECTION;  Surgeon: Jayshree Garcia MD;  Location: [facility]     HERNIORRHAPHY INCISIONAL (LOCATION) N/A 2019    Procedure: Incisional hernia repair;  Surgeon: Tani Sinclair DO;  Location: [facility]     LAPAROSCOPIC ABLATION ENDOMETRIOSIS  4/15/2014    Procedure: LAPAROSCOPIC ABLATION ENDOMETRIOSIS;  Surgeon: Mohamud Osorio MD;  [facility]     LAPAROSCOPIC TUBAL DYE STUDY  4/15/2014    Procedure: LAPAROSCOPIC TUBAL DYE STUDY;  Surgeon: Mohamud Osorio MD;  [facility]     LAPAROSCOPY DIAGNOSTIC (GYN)  4/15/2014    Procedure: Operative Laparoscopy, Excision  Cauterization Of Endometriosis, Chromopertubation ;  Surgeon: Mohamud Osorio MD;  [facility]     SALPINGECTOMY Bilateral 9/16/2020    Procedure: SALPINGECTOMY;  Surgeon: Jayshree Garcia MD;  Location: [facility]        Family History   Problem Relation Age of Onset     Lipids Father      Hypertension Father      Cardiovascular Father         A fib     Cancer Maternal Grandmother         ovarian      Diabetes Maternal Grandfather      Heart Disease Maternal Grandfather      Cancer Maternal Grandfather         skin     Cardiovascular Maternal Grandfather         MI     Skin Cancer Maternal Grandfather      Thyroid Disease Brother         ?     Cancer Mother         leukemia     Bleeding Disorder Mother         anemia     Depression Mother      Cardiovascular Paternal Grandfather         MI     Genitourinary Problems Paternal Grandmother         kidney removed     Skin Cancer Maternal Aunt        Social History     Socioeconomic History     Marital status:      Spouse name: Not on file     Number of children: Not on file     Years of education: Not on file     Highest education level: Not on file   Occupational History     Not on file   Tobacco Use     Smoking status: Never Smoker     Smokeless tobacco: Never Used   Substance and Sexual Activity     Alcohol use: Not Currently     Comment: occas- quit with pregnancy     Drug use: No     Sexual activity: Yes     Partners: Male     Birth control/protection: None     Comment:  since 2005   Other Topics Concern     Parent/sibling w/ CABG, MI or angioplasty before 65F 55M? No   Social History Narrative     Not on file     Social Determinants of Health     Financial Resource Strain:      Difficulty of Paying Living Expenses:    Food Insecurity:      Worried About Running Out of Food in the Last Year:      Ran Out of Food in the Last Year:    Transportation Needs:      Lack of Transportation (Medical):      Lack of Transportation (Non-Medical):    Physical  Activity:      Days of Exercise per Week:      Minutes of Exercise per Session:    Stress:      Feeling of Stress :    Social Connections:      Frequency of Communication with Friends and Family:      Frequency of Social Gatherings with Friends and Family:      Attends Taoist Services:      Active Member of Clubs or Organizations:      Attends Club or Organization Meetings:      Marital Status:    Intimate Partner Violence:      Fear of Current or Ex-Partner:      Emotionally Abused:      Physically Abused:      Sexually Abused:        Outpatient Encounter Medications as of 10/14/2021   Medication Sig Dispense Refill     dextromethorphan-guaiFENesin (MUCINEX DM)  MG 12 hr tablet Take 1 tablet by mouth every 12 hours (Patient not taking: Reported on 10/14/2021) 30 tablet 0     doxycycline hyclate (VIBRA-TABS) 100 MG tablet Take 1 tablet (100 mg) by mouth 2 times daily (Patient not taking: Reported on 10/14/2021) 14 tablet 0     Prenatal Vit-Fe Fumarate-FA (PRENATAL MULTIVITAMIN  PLUS IRON) 27-0.8 MG TABS Take 1 tablet by mouth daily (Patient not taking: Reported on 10/14/2021)       triamcinolone (KENALOG) 0.1 % external cream Apply twice daily sparingly to affected are on elbow, knees as needed. (Patient not taking: Reported on 8/20/2020) 80 g 4     No facility-administered encounter medications on file as of 10/14/2021.             O:   NAD, WDWN, Alert & Oriented, Mood & Affect wnl, Vitals stable   Here today alone   /78 (BP Location: Right arm, Patient Position: Sitting, Cuff Size: Adult Regular)   Pulse 63   SpO2 100%    General appearance normal   Vitals stable   Alert, oriented and in no acute distress   Brown macules on face   Stuck on papules and brown macules on trunk and ext   Red papules on trunk  Brown papules and macules with regular pigment network and borders on torso and extremities      The remainder of skin exam is normal       Eyes: Conjunctivae/lids:Normal     ENT: Lips:  normal    MSK:Normal    Cardiovascular: peripheral edema none    Pulm: Breathing Normal    Neuro/Psych: Orientation:Alert and Orientedx3 ; Mood/Affect:normal     A/P:  1. Seborrheic keratosis, lentigo, angioma, benign nevi   BENIGN LESIONS DISCUSSED WITH PATIENT:  I discussed the specifics of tumor, prognosis, and genetics of benign lesions.  I explained that treatment of these lesions would be purely cosmetic and not medically neccessary.  I discussed with patient different removal options including excision, cautery and /or laser.    Nature and genetics of benign skin lesions dicussed with patient.  Signs and Symptoms of skin cancer discussed with patient.  ABCDEs of melanoma reviewed with patient.  Patient encouraged to perform monthly skin exams.  UV precautions reviewed with patient.  Risks of non-melanoma skin cancer discussed with patient   Return to clinic in one year or sooner if needed.         Again, thank you for allowing me to participate in the care of your patient.        Sincerely,        Susan Ballard PA-C

## 2021-10-14 NOTE — NURSING NOTE
Chief Complaint   Patient presents with     Skin Check     no concerns       Vitals:    10/14/21 1402   BP: 130/78   BP Location: Right arm   Patient Position: Sitting   Cuff Size: Adult Regular   Pulse: 63   SpO2: 100%     Wt Readings from Last 1 Encounters:   10/29/20 55.3 kg (122 lb)       Bri Webster LPN .................10/14/2021     FETAL ultrasound <14 WKS SINGLE



CLINICAL HISTORY: +home preg test, LLQ abd pain   



COMPARISON STUDY:  Abdomen and pelvis CT 9/12/2016. Pelvic ultrasound 9/11/2016.



FINDINGS: Transabdominal and transvaginal scanning of the pelvis was performed.

The uterus measures 8.6 x 5.8 x 4.5 cm. The endometrium slightly heterogeneous

and measures up to 1.2 cm in thickness. Within the center of the endometrial

fundus there is a 9 x 3 mm cystic focus. This favors a small gestational sac.

There may be a tiny yolk sac which measures 2 mm. No fetal pole identified at

this time. These findings favor a 5 week intrauterine gestation. No significant

subchorionic hematoma. Trace pelvic fluid. Thick-walled complex cyst within the

right ovary which measures 2.5 cm. This favors a corpus luteum. Normal left

ovary. Normal color flow within the bilateral ovaries.



IMPRESSION:  

1. A 9 x 3 mm cystic focus within the endometrium which favors a small

intrauterine gestational sac. There may be a tiny yolk sac which measures 2 mm.

No fetal pole identified at this time. This favors a 5 week intrauterine

gestation. Follow-up beta-hCG and pelvic ultrasound in 1 week is recommended to

ensure viability.

2. Complex 2.5 cm right ovarian cyst. This favors a corpus luteum.

3. Normal left ovary.









Electronically signed by:  Shahzad Spencer M.D.

11/26/2017 8:38 PM



Dictated Date/Time:  11/26/2017 8:33 PM

## 2021-10-14 NOTE — PROGRESS NOTES
María Briones is an extremely pleasant 41 year old year old female patient here today for evaluation of moles on body. She denies any new or changing nevi. She notes she just had her third child last year. Patient has no other skin complaints today.  Remainder of the HPI, Meds, PMH, Allergies, FH, and SH was reviewed in chart.    Pertinent Hx:     Past Medical History:   Diagnosis Date     Chickenpox      Female infertility 2014    History of Stage III endometriosis by laparoscopy on 4/15/14  Prior Successful IVF      Incisional hernia, without obstruction or gangrene 10/10/2019     Palpitations 2008     Pes anserinus tendinitis or bursitis 10/24/2012     Urinary tract bacterial infections     as chold       Past Surgical History:   Procedure Laterality Date     C ORAL SURGERY PROCEDURE      wisdom teeth      SECTION N/A 10/12/2015    Procedure:  SECTION;  Surgeon: Jayshree Garcia MD;  Location: [facility]      SECTION N/A 2018    Procedure:  Section;  Surgeon: Jayshree Garcia MD;  Location: [facility]      SECTION N/A 2020    Procedure:  SECTION;  Surgeon: Jayshree Garcia MD;  [facility]: [facility]     HERNIORRHAPHY INCISIONAL (LOCATION) N/A 2019    Procedure: Incisional hernia repair;  Surgeon: Tani Sinclair DO;  [facility]: [facility]     LAPAROSCOPIC ABLATION ENDOMETRIOSIS  4/15/2014    Procedure: LAPAROSCOPIC ABLATION ENDOMETRIOSIS;  Surgeon: Mohamud Osorio MD;  [facility]     LAPAROSCOPIC TUBAL DYE STUDY  4/15/2014    Procedure: LAPAROSCOPIC TUBAL DYE STUDY;  Surgeon: Mohamud Osorio MD;  [facility]     LAPAROSCOPY DIAGNOSTIC (GYN)  4/15/2014    Procedure: Operative Laparoscopy, Excision Cauterization Of Endometriosis, Chromopertubation ;  Surgeon: Mohamud Osorio MD;  [facility]     SALPINGECTOMY Bilateral 2020    Procedure: SALPINGECTOMY;  Surgeon: Jayshree Garcia MD;  Location: [facility]        Family History    Problem Relation Age of Onset     Lipids Father      Hypertension Father      Cardiovascular Father         A fib     Cancer Maternal Grandmother         ovarian      Diabetes Maternal Grandfather      Heart Disease Maternal Grandfather      Cancer Maternal Grandfather         skin     Cardiovascular Maternal Grandfather         MI     Skin Cancer Maternal Grandfather      Thyroid Disease Brother         ?     Cancer Mother         leukemia     Bleeding Disorder Mother         anemia     Depression Mother      Cardiovascular Paternal Grandfather         MI     Genitourinary Problems Paternal Grandmother         kidney removed     Skin Cancer Maternal Aunt        Social History     Socioeconomic History     Marital status:      Spouse name: Not on file     Number of children: Not on file     Years of education: Not on file     Highest education level: Not on file   Occupational History     Not on file   Tobacco Use     Smoking status: Never Smoker     Smokeless tobacco: Never Used   Substance and Sexual Activity     Alcohol use: Not Currently     Comment: occas- quit with pregnancy     Drug use: No     Sexual activity: Yes     Partners: Male     Birth control/protection: None     Comment:  since 2005   Other Topics Concern     Parent/sibling w/ CABG, MI or angioplasty before 65F 55M? No   Social History Narrative     Not on file     Social Determinants of Health     Financial Resource Strain:      Difficulty of Paying Living Expenses:    Food Insecurity:      Worried About Running Out of Food in the Last Year:      Ran Out of Food in the Last Year:    Transportation Needs:      Lack of Transportation (Medical):      Lack of Transportation (Non-Medical):    Physical Activity:      Days of Exercise per Week:      Minutes of Exercise per Session:    Stress:      Feeling of Stress :    Social Connections:      Frequency of Communication with Friends and Family:      Frequency of Social Gatherings with  Friends and Family:      Attends Faith Services:      Active Member of Clubs or Organizations:      Attends Club or Organization Meetings:      Marital Status:    Intimate Partner Violence:      Fear of Current or Ex-Partner:      Emotionally Abused:      Physically Abused:      Sexually Abused:        Outpatient Encounter Medications as of 10/14/2021   Medication Sig Dispense Refill     dextromethorphan-guaiFENesin (MUCINEX DM)  MG 12 hr tablet Take 1 tablet by mouth every 12 hours (Patient not taking: Reported on 10/14/2021) 30 tablet 0     doxycycline hyclate (VIBRA-TABS) 100 MG tablet Take 1 tablet (100 mg) by mouth 2 times daily (Patient not taking: Reported on 10/14/2021) 14 tablet 0     Prenatal Vit-Fe Fumarate-FA (PRENATAL MULTIVITAMIN  PLUS IRON) 27-0.8 MG TABS Take 1 tablet by mouth daily (Patient not taking: Reported on 10/14/2021)       triamcinolone (KENALOG) 0.1 % external cream Apply twice daily sparingly to affected are on elbow, knees as needed. (Patient not taking: Reported on 8/20/2020) 80 g 4     No facility-administered encounter medications on file as of 10/14/2021.             O:   NAD, WDWN, Alert & Oriented, Mood & Affect wnl, Vitals stable   Here today alone   /78 (BP Location: Right arm, Patient Position: Sitting, Cuff Size: Adult Regular)   Pulse 63   SpO2 100%    General appearance normal   Vitals stable   Alert, oriented and in no acute distress   Brown macules on face   Stuck on papules and brown macules on trunk and ext   Red papules on trunk  Brown papules and macules with regular pigment network and borders on torso and extremities      The remainder of skin exam is normal       Eyes: Conjunctivae/lids:Normal     ENT: Lips: normal    MSK:Normal    Cardiovascular: peripheral edema none    Pulm: Breathing Normal    Neuro/Psych: Orientation:Alert and Orientedx3 ; Mood/Affect:normal     A/P:  1. Seborrheic keratosis, lentigo, angioma, benign nevi   BENIGN LESIONS  DISCUSSED WITH PATIENT:  I discussed the specifics of tumor, prognosis, and genetics of benign lesions.  I explained that treatment of these lesions would be purely cosmetic and not medically neccessary.  I discussed with patient different removal options including excision, cautery and /or laser.    Nature and genetics of benign skin lesions dicussed with patient.  Signs and Symptoms of skin cancer discussed with patient.  ABCDEs of melanoma reviewed with patient.  Patient encouraged to perform monthly skin exams.  UV precautions reviewed with patient.  Risks of non-melanoma skin cancer discussed with patient   Return to clinic in one year or sooner if needed.

## 2021-10-29 ENCOUNTER — OFFICE VISIT (OUTPATIENT)
Dept: FAMILY MEDICINE | Facility: CLINIC | Age: 41
End: 2021-10-29
Payer: COMMERCIAL

## 2021-10-29 VITALS
OXYGEN SATURATION: 98 % | SYSTOLIC BLOOD PRESSURE: 125 MMHG | HEIGHT: 64 IN | RESPIRATION RATE: 14 BRPM | DIASTOLIC BLOOD PRESSURE: 78 MMHG | TEMPERATURE: 97.2 F | BODY MASS INDEX: 19.09 KG/M2 | HEART RATE: 81 BPM | WEIGHT: 111.8 LBS

## 2021-10-29 DIAGNOSIS — Z00.00 ROUTINE GENERAL MEDICAL EXAMINATION AT A HEALTH CARE FACILITY: Primary | ICD-10-CM

## 2021-10-29 DIAGNOSIS — Z13.1 SCREENING FOR DIABETES MELLITUS: ICD-10-CM

## 2021-10-29 DIAGNOSIS — Z23 NEED FOR PROPHYLACTIC VACCINATION AND INOCULATION AGAINST INFLUENZA: ICD-10-CM

## 2021-10-29 DIAGNOSIS — Z13.0 SCREENING FOR DEFICIENCY ANEMIA: ICD-10-CM

## 2021-10-29 DIAGNOSIS — Z13.220 SCREENING FOR HYPERLIPIDEMIA: ICD-10-CM

## 2021-10-29 LAB
CHOLEST SERPL-MCNC: 149 MG/DL
FASTING STATUS PATIENT QL REPORTED: YES
FASTING STATUS PATIENT QL REPORTED: YES
GLUCOSE BLD-MCNC: 90 MG/DL (ref 70–99)
HDLC SERPL-MCNC: 67 MG/DL
HGB BLD-MCNC: 13.8 G/DL (ref 11.7–15.7)
LDLC SERPL CALC-MCNC: 72 MG/DL
NONHDLC SERPL-MCNC: 82 MG/DL
TRIGL SERPL-MCNC: 52 MG/DL

## 2021-10-29 PROCEDURE — 99396 PREV VISIT EST AGE 40-64: CPT | Mod: 25 | Performed by: NURSE PRACTITIONER

## 2021-10-29 PROCEDURE — 90686 IIV4 VACC NO PRSV 0.5 ML IM: CPT | Performed by: NURSE PRACTITIONER

## 2021-10-29 PROCEDURE — 85018 HEMOGLOBIN: CPT | Performed by: NURSE PRACTITIONER

## 2021-10-29 PROCEDURE — 36415 COLL VENOUS BLD VENIPUNCTURE: CPT | Performed by: NURSE PRACTITIONER

## 2021-10-29 PROCEDURE — 90471 IMMUNIZATION ADMIN: CPT | Performed by: NURSE PRACTITIONER

## 2021-10-29 PROCEDURE — 80061 LIPID PANEL: CPT | Performed by: NURSE PRACTITIONER

## 2021-10-29 PROCEDURE — 82947 ASSAY GLUCOSE BLOOD QUANT: CPT | Performed by: NURSE PRACTITIONER

## 2021-10-29 ASSESSMENT — PAIN SCALES - GENERAL: PAINLEVEL: NO PAIN (0)

## 2021-10-29 ASSESSMENT — ENCOUNTER SYMPTOMS
DYSURIA: 0
SHORTNESS OF BREATH: 0
FEVER: 0
HEADACHES: 0
COUGH: 0
CONSTIPATION: 0
HEMATOCHEZIA: 0
JOINT SWELLING: 0
DIZZINESS: 0
SORE THROAT: 0
FREQUENCY: 0
PARESTHESIAS: 0
HEMATURIA: 0
NERVOUS/ANXIOUS: 0
ARTHRALGIAS: 0
MYALGIAS: 0
ABDOMINAL PAIN: 0
WEAKNESS: 0
EYE PAIN: 0
CHILLS: 0
HEARTBURN: 0
PALPITATIONS: 0
DIARRHEA: 0
NAUSEA: 0

## 2021-10-29 ASSESSMENT — MIFFLIN-ST. JEOR: SCORE: 1154.18

## 2021-10-29 NOTE — RESULT ENCOUNTER NOTE
María,   Labs completed in clinic are within normal limits. Cholesterol looks great. No signs of anemia. You are not diabetic. Let me know if you have any questions.   RANDELL Reaves CNP

## 2021-10-29 NOTE — PROGRESS NOTES
SUBJECTIVE:   CC: María Briones is an 41 year old woman who presents for preventive health visit.       Patient has been advised of split billing requirements and indicates understanding: Yes  Healthy Habits:     Getting at least 3 servings of Calcium per day:  Yes    Bi-annual eye exam:  Yes    Dental care twice a year:  Yes    Sleep apnea or symptoms of sleep apnea:  None    Diet:  Regular (no restrictions)    Frequency of exercise:  2-3 days/week    Duration of exercise:  30-45 minutes    Taking medications regularly:  Not Applicable    Medication side effects:  Not applicable    PHQ-2 Total Score: 0    Additional concerns today:  No      Today's PHQ-2 Score:   PHQ-2 ( 1999 Pfizer) 10/29/2021   Q1: Little interest or pleasure in doing things 0   Q2: Feeling down, depressed or hopeless 0   PHQ-2 Score 0   Q1: Little interest or pleasure in doing things Not at all   Q2: Feeling down, depressed or hopeless Not at all   PHQ-2 Score 0       Abuse: Current or Past (Physical, Sexual or Emotional) - No  Do you feel safe in your environment? Yes        Social History     Tobacco Use     Smoking status: Never Smoker     Smokeless tobacco: Never Used   Substance Use Topics     Alcohol use: Not Currently     Comment: occas- quit with pregnancy     If you drink alcohol do you typically have >3 drinks per day or >7 drinks per week? No    Alcohol Use 10/29/2021   Prescreen: >3 drinks/day or >7 drinks/week? No   Prescreen: >3 drinks/day or >7 drinks/week? -       Reviewed orders with patient.  Reviewed health maintenance and updated orders accordingly - Yes  BP Readings from Last 3 Encounters:   10/29/21 125/78   10/14/21 130/78   10/29/20 124/78    Wt Readings from Last 3 Encounters:   10/29/21 50.7 kg (111 lb 12.8 oz)   10/29/20 55.3 kg (122 lb)   09/16/20 60.4 kg (133 lb 3.2 oz)                  Patient Active Problem List   Diagnosis     Endometriosis     Other specified hypothyroidism     Past Surgical History:    Procedure Laterality Date     C ORAL SURGERY PROCEDURE      wisdom teeth      SECTION N/A 10/12/2015    Procedure:  SECTION;  Surgeon: Jayshree Garcia MD;  [facility]      SECTION N/A 2018    Procedure:  Section;  Surgeon: Jayshree Garcia MD;  [facility]      SECTION N/A 2020    Procedure:  SECTION;  Surgeon: Jayshree Garcia MD;  [facility]     HERNIORRHAPHY INCISIONAL (LOCATION) N/A 2019    Procedure: Incisional hernia repair;  Surgeon: Tani Sinclair DO;  [facility]     LAPAROSCOPIC ABLATION ENDOMETRIOSIS  4/15/2014    Procedure: LAPAROSCOPIC ABLATION ENDOMETRIOSIS;  Surgeon: Mohamud Osorio MD;  [facility]     LAPAROSCOPIC TUBAL DYE STUDY  4/15/2014    Procedure: LAPAROSCOPIC TUBAL DYE STUDY;  Surgeon: Mohamud Osorio MD;  [facility]     LAPAROSCOPY DIAGNOSTIC (GYN)  4/15/2014    Procedure: Operative Laparoscopy, Excision Cauterization Of Endometriosis, Chromopertubation ;  Surgeon: Mohamud Osorio MD;  [facility]     SALPINGECTOMY Bilateral 2020    Procedure: SALPINGECTOMY;  Surgeon: Jayshree Garcia MD;  Location: [facility]       Social History     Tobacco Use     Smoking status: Never Smoker     Smokeless tobacco: Never Used   Substance Use Topics     Alcohol use: Not Currently     Comment: occas- quit with pregnancy     Family History   Problem Relation Age of Onset     Lipids Father      Hypertension Father      Cardiovascular Father         A fib     Cancer Maternal Grandmother         ovarian      Diabetes Maternal Grandfather      Heart Disease Maternal Grandfather      Cancer Maternal Grandfather         skin     Cardiovascular Maternal Grandfather         MI     Skin Cancer Maternal Grandfather      Thyroid Disease Brother         ?     Cancer Mother         leukemia     Bleeding Disorder Mother         anemia     Depression Mother      Cardiovascular Paternal Grandfather          MI     Genitourinary Problems Paternal Grandmother         kidney removed     Skin Cancer Maternal Aunt          No current outpatient medications on file.     Allergies   Allergen Reactions     Azithromycin Swelling     Lip swelling       Breast Cancer Screening:    Breast CA Risk Assessment (FHS-7) 10/29/2021   Do you have a family history of breast, colon, or ovarian cancer? No / Unknown         Mammogram Screening - Offered annual screening and updated Health Maintenance for mutual plan based on risk factor consideration    Pertinent mammograms are reviewed under the imaging tab.    History of abnormal Pap smear: NO - age 30- 65 PAP every 3 years recommended  PAP / HPV Latest Ref Rng & Units 1/3/2020 10/24/2016 10/11/2011   PAP (Historical) - NIL NIL NIL   HPV16 NEG:Negative Negative Negative -   HPV18 NEG:Negative Negative Negative -   HRHPV NEG:Negative Negative Negative -     Reviewed and updated as needed this visit by clinical staff  Tobacco  Allergies  Meds  Problems  Med Hx  Surg Hx  Fam Hx  Soc Hx        Reviewed and updated as needed this visit by Provider  Tobacco  Allergies  Meds  Problems  Med Hx  Surg Hx  Fam Hx         Past Medical History:   Diagnosis Date     Chickenpox      Female infertility 2014    History of Stage III endometriosis by laparoscopy on 4/15/14  Prior Successful IVF      Incisional hernia, without obstruction or gangrene 10/10/2019     Palpitations 2008     Pes anserinus tendinitis or bursitis 10/24/2012     Urinary tract bacterial infections     as chold      Past Surgical History:   Procedure Laterality Date     C ORAL SURGERY PROCEDURE      wisdom teeth      SECTION N/A 10/12/2015    Procedure:  SECTION;  Surgeon: Jayshree Garcia MD;  [facility]      SECTION N/A 2018    Procedure:  Section;  Surgeon: Jayshree Garcia MD;  [facility]      SECTION N/A 2020    Procedure:  SECTION;   Surgeon: Jayshree Garcia MD;  [facility]     HERNIORRHAPHY INCISIONAL (LOCATION) N/A 2019    Procedure: Incisional hernia repair;  Surgeon: Tani Sinclair DO;  [facility]     LAPAROSCOPIC ABLATION ENDOMETRIOSIS  4/15/2014    Procedure: LAPAROSCOPIC ABLATION ENDOMETRIOSIS;  Surgeon: Mohamud Osorio MD;  [facility]     LAPAROSCOPIC TUBAL DYE STUDY  4/15/2014    Procedure: LAPAROSCOPIC TUBAL DYE STUDY;  Surgeon: Mohamud Osorio MD;  [facility]     LAPAROSCOPY DIAGNOSTIC (GYN)  4/15/2014    Procedure: Operative Laparoscopy, Excision Cauterization Of Endometriosis, Chromopertubation ;  Surgeon: Mohamud Osorio MD;  [facility]     SALPINGECTOMY Bilateral 2020    Procedure: SALPINGECTOMY;  Surgeon: Jayshree Garcia MD;  [facility]     OB History    Para Term  AB Living   3 3 3 0 0 3   SAB TAB Ectopic Multiple Live Births   0 0 0 0 3      # Outcome Date GA Lbr Dino/2nd Weight Sex Delivery Anes PTL Lv   3 Term 20 39w2d  4.02 kg (8 lb 13.8 oz) M CS-LTranv   LO      Name: SACHAMALE-MACHO      Apgar1: 8  Apgar5: 9   2 Term 18 40w1d  3.714 kg (8 lb 3 oz) F CS-LTranv Spinal  LO      Name: SACHABABY1 MACHO      Apgar1: 9  Apgar5: 9   1 Term 10/12/15 39w3d  3.544 kg (7 lb 13 oz) F CS-LTranv Spinal N LO      Name: Beva      Apgar1: 9  Apgar5: 9       Review of Systems   Constitutional: Negative for chills and fever.   HENT: Negative for congestion, ear pain, hearing loss and sore throat.    Eyes: Negative for pain and visual disturbance.   Respiratory: Negative for cough and shortness of breath.    Cardiovascular: Negative for chest pain, palpitations and peripheral edema.   Gastrointestinal: Negative for abdominal pain, constipation, diarrhea, heartburn, hematochezia and nausea.   Genitourinary: Negative for dysuria, frequency, genital sores, hematuria and urgency.   Musculoskeletal: Negative for arthralgias, joint swelling and myalgias.   Skin:  "Negative for rash.   Neurological: Negative for dizziness, weakness, headaches and paresthesias.   Psychiatric/Behavioral: Negative for mood changes. The patient is not nervous/anxious.         OBJECTIVE:   /78 (BP Location: Right arm, Patient Position: Chair, Cuff Size: Adult Small)   Pulse 81   Temp 97.2  F (36.2  C) (Tympanic)   Resp 14   Ht 1.613 m (5' 3.5\")   Wt 50.7 kg (111 lb 12.8 oz)   LMP 10/15/2021 (Approximate)   SpO2 98%   Breastfeeding No   BMI 19.49 kg/m       Physical Exam  GENERAL: healthy, alert and no distress  EYES: Eyes grossly normal to inspection, PERRL and conjunctivae and sclerae normal  HENT: ear canals and TM's normal, nose and mouth without ulcers or lesions  NECK: no adenopathy, no asymmetry, masses, or scars and thyroid normal to palpation  RESP: lungs clear to auscultation - no rales, rhonchi or wheezes  BREAST: normal without masses, tenderness or nipple discharge and no palpable axillary masses or adenopathy  CV: regular rate and rhythm, normal S1 S2, no S3 or S4, no murmur, click or rub, no peripheral edema and peripheral pulses strong  ABDOMEN: soft, nontender, no hepatosplenomegaly, no masses and bowel sounds normal  MS: no gross musculoskeletal defects noted, no edema  SKIN: no suspicious lesions or rashes  NEURO: Normal strength and tone, mentation intact and speech normal  PSYCH: mentation appears normal, affect normal/bright    ASSESSMENT/PLAN:       ICD-10-CM    1. Routine general medical examination at a health care facility  Z00.00 REVIEW OF HEALTH MAINTENANCE PROTOCOL ORDERS   2. Need for prophylactic vaccination and inoculation against influenza  Z23 INFLUENZA VACCINE IM > 6 MONTHS VALENT IIV4 (AFLURIA/FLUZONE)     CANCELED: INFLUENZA VACCINE IM > 6 MONTHS VALENT IIV4 (AFLURIA/FLUZONE)   3. Screening for hyperlipidemia  Z13.220 Lipid panel reflex to direct LDL Fasting     Lipid panel reflex to direct LDL Fasting   4. Screening for diabetes mellitus  Z13.1 " "Glucose     Glucose   5. Screening for deficiency anemia  Z13.0 Hemoglobin     Hemoglobin     Healthy female exam completed today. Discussed lifestyle modifications including weight management, eating a balanced diet, and getting regular cardiovascular exercise. Discussed self breast exams and how to complete these. Pap Smear up to date. Labs updated as above. Plan yearly annual physicals or sooner as needed.    Patient has been advised of split billing requirements and indicates understanding: Yes  COUNSELING:  Reviewed preventive health counseling, as reflected in patient instructions       Regular exercise       Healthy diet/nutrition       Contraception       Family planning       Safe sex practices/STD prevention    Estimated body mass index is 19.49 kg/m  as calculated from the following:    Height as of this encounter: 1.613 m (5' 3.5\").    Weight as of this encounter: 50.7 kg (111 lb 12.8 oz).        She reports that she has never smoked. She has never used smokeless tobacco.      Counseling Resources:  ATP IV Guidelines  Pooled Cohorts Equation Calculator  Breast Cancer Risk Calculator  BRCA-Related Cancer Risk Assessment: FHS-7 Tool  FRAX Risk Assessment  ICSI Preventive Guidelines  Dietary Guidelines for Americans, 2010  USDA's MyPlate  ASA Prophylaxis  Lung CA Screening    Inocencia Blanchard, RANDELL CNP  [facility]  "

## 2021-11-19 ENCOUNTER — IMMUNIZATION (OUTPATIENT)
Dept: FAMILY MEDICINE | Facility: CLINIC | Age: 41
End: 2021-11-19
Payer: COMMERCIAL

## 2021-11-19 PROCEDURE — 91301 PR COVID VAC MODERNA 100 MCG/0.5 ML IM: CPT

## 2021-11-19 PROCEDURE — 0013A PR COVID VAC MODERNA 100 MCG/0.5 ML IM: CPT

## 2021-11-19 PROCEDURE — 99207 PR NO CHARGE NURSE ONLY: CPT

## 2021-12-01 NOTE — NURSING NOTE
"Initial BP (!) 141/75 (BP Location: Right arm, Patient Position: Chair, Cuff Size: Adult Regular)   Pulse 71   Temp 98.2  F (36.8  C) (Tympanic)   Resp 16   Ht 1.6 m (5' 3\")   Wt 55.3 kg (122 lb)   LMP 12/16/2019   Breastfeeding No   BMI 21.61 kg/m   Estimated body mass index is 21.61 kg/m  as calculated from the following:    Height as of this encounter: 1.6 m (5' 3\").    Weight as of this encounter: 55.3 kg (122 lb). .    Bri Camejo, ALTAGRACIA    "
equal bilaterally

## 2022-01-11 ENCOUNTER — VIRTUAL VISIT (OUTPATIENT)
Dept: FAMILY MEDICINE | Facility: CLINIC | Age: 42
End: 2022-01-11
Payer: COMMERCIAL

## 2022-01-11 DIAGNOSIS — J01.10 ACUTE NON-RECURRENT FRONTAL SINUSITIS: Primary | ICD-10-CM

## 2022-01-11 PROCEDURE — 99213 OFFICE O/P EST LOW 20 MIN: CPT | Mod: 95 | Performed by: NURSE PRACTITIONER

## 2022-01-11 ASSESSMENT — PAIN SCALES - GENERAL: PAINLEVEL: MODERATE PAIN (5)

## 2022-01-11 NOTE — PROGRESS NOTES
María is a 41 year old who is being evaluated via a billable video visit.      How would you like to obtain your AVS? MyChart  If the video visit is dropped, the invitation should be resent by: Text to cell phone: [phone]  Will anyone else be joining your video visit? No       Video Start Time: 9:05 AM     Assessment & Plan     Acute non-recurrent frontal sinusitis  - amoxicillin-clavulanate (AUGMENTIN) 875-125 MG tablet; Take 1 tablet by mouth 2 times daily for 10 days         Patient Instructions     Patient Education     Sinusitis (Antibiotic Treatment)    The sinuses are air-filled spaces within the bones of the face. They connect to the inside of the nose. Sinusitis is an inflammation of the tissue that lines the sinuses. Sinusitis can occur during a cold. It can also happen due to allergies to pollens and other particles in the air. Sinusitis can cause symptoms of sinus congestion and a feeling of fullness. A sinus infection causes fever, headache, and facial pain. There is often green or yellow fluid draining from the nose or into the back of the throat (post-nasal drip). You have been given antibiotics to treat this condition.   Home care    Take the full course of antibiotics as instructed. Don't stop taking them, even when you feel better.    Drink plenty of water, hot tea, and other liquids as directed by the healthcare provider. This may help thin nasal mucus. It also may help your sinuses drain fluids.    Heat may help soothe painful areas of your face. Use a towel soaked in hot water. Or,  the shower and direct the warm spray onto your face. Using a vaporizer along with a menthol rub at night may also help soothe symptoms.     An expectorant with guaifenesin may help thin nasal mucus and help your sinuses drain fluids. Talk with your provider or pharmacists before taking an over-the-counter (OTC) medicine if you have any questions about it or its side effects..    You can use an  OTC decongestant, unless a similar medicine was prescribed to you. Nasal sprays work the fastest. Use one that contains phenylephrine or oxymetazoline. First blow your nose gently. Then use the spray. Don't use these medicines more often than directed on the label. If you do, your symptoms may get worse. You may also take pills that contain pseudoephedrine. Don t use products that combine multiple medicines. This is because side effects may be increased. Read labels. You can also ask the pharmacist for help. (People with high blood pressure should not use decongestants. They can raise blood pressure.) Talk with your provider or pharmacist if you have any questions about the medicine..    OTC antihistamines may help if allergies contributed to your sinusitis. Talk with your provider or pharmacist if you have any questions about the medicine..    Don't use nasal rinses or irrigation during an acute sinus infection, unless your healthcare provider tells you to. Rinsing may spread the infection to other areas in your sinuses.    Use acetaminophen or ibuprofen to control pain, unless another pain medicine was prescribed to you. If you have chronic liver or kidney disease or ever had a stomach ulcer, talk with your healthcare provider before using these medicines. Never give aspirin to anyone under age 18 who is ill with a fever. It may cause severe liver damage.    Don't smoke. This can make symptoms worse.    Follow-up care  Follow up with your healthcare provider, or as advised.   When to seek medical advice  Call your healthcare provider if any of these occur:     Facial pain or headache that gets worse    Stiff neck    Unusual drowsiness or confusion    Swelling of your forehead or eyelids    Symptoms don't go away in 10 days    Vision problems, such as blurred or double vision    Fever of 100.4 F (38 C) or higher, or as directed by your healthcare provider  Call 911  Call 911 if any of these occur:      Seizure    Trouble breathing    Feeling dizzy or faint    Fingernails, skin or lips look blue, purple , or gray  Prevention  Here are steps you can take to help prevent an infection:     Keep good hand washing habits.    Don t have close contact with people who have sore throats, colds, or other upper respiratory infections.    Don t smoke, and stay away from secondhand smoke.    Stay up to date with of your vaccines.  [employer] last reviewed this educational content on 12/1/2019 2000-2021 [employer]. All rights reserved. This information is not intended as a substitute for professional medical care. Always follow your healthcare professional's instructions.               Return in about 1 week (around 1/18/2022), or if symptoms worsen or fail to improve.    The risks, benefits and treatment options of prescribed medications or other treatments have been discussed with the patient. The patient verbalized their understanding and should call or follow up if no improvement or if they develop further problems.    RANDELL Pantoja CNP  [facility]          Jose Daniel is a 41 year old who presents for the following health issues     HPI       Concern for COVID-19  About how many days ago did these symptoms start? 12/30/2021  Is this your first visit for this illness? Yes  In the 14 days before your symptoms started, have you had close contact with someone with COVID-19 (Coronavirus)? No  Do you have a fever or chills? Yes, the highest temperature was 101 last night - hadn't had a fever until last night  Are you having new or worsening difficulty breathing? No  Do you have new or worsening cough? Yes, I am coughing up mucus. Mucous is from the post nasal drainage.  Have you had any new or unexplained body aches? YES    Have you experienced any of the following NEW symptoms?    Headache: YES    Sore throat: No    Loss of taste or smell: No    Chest pain:  No    Diarrhea: No    Rash: No     Frontal sinus congestion - main symptoms  What treatments have you tried? Tylenol cold, and ibuprofen  Who do you live with?  and three kids  Are you, or a household member, a healthcare worker or a ? YES  She works in a dental office  Do you live in a nursing home, group home, or shelter? No  Do you have a way to get food/medications if quarantined? Yes, I have a friend or family member who can help me.    Has taken 7 at home tests - all negative.  No one else in the house is sick.          Has been vaccinated against COVID and influenza          Review of Systems   Constitutional, HEENT, cardiovascular, pulmonary, gi and gu systems are negative, except as otherwise noted.      Objective           Vitals:  No vitals were obtained today due to virtual visit.    Physical Exam   GENERAL: Healthy, alert and no distress  EYES: Eyes grossly normal to inspection.  No discharge or erythema, or obvious scleral/conjunctival abnormalities.  RESP: No audible wheeze, cough, or visible cyanosis.  No visible retractions or increased work of breathing.    SKIN: Visible skin clear. No significant rash, abnormal pigmentation or lesions.  NEURO: Cranial nerves grossly intact.  Mentation and speech appropriate for age.  PSYCH: Mentation appears normal, affect normal/bright, judgement and insight intact, normal speech and appearance well-groomed.                Video-Visit Details    Type of service:  Video Visit    Video End Time:9:13 AM    Originating Location (pt. Location): Home    Distant Location (provider location):  [facility]     Platform used for Video Visit: [employer]

## 2022-01-11 NOTE — PATIENT INSTRUCTIONS
Patient Education     Sinusitis (Antibiotic Treatment)    The sinuses are air-filled spaces within the bones of the face. They connect to the inside of the nose. Sinusitis is an inflammation of the tissue that lines the sinuses. Sinusitis can occur during a cold. It can also happen due to allergies to pollens and other particles in the air. Sinusitis can cause symptoms of sinus congestion and a feeling of fullness. A sinus infection causes fever, headache, and facial pain. There is often green or yellow fluid draining from the nose or into the back of the throat (post-nasal drip). You have been given antibiotics to treat this condition.   Home care    Take the full course of antibiotics as instructed. Don't stop taking them, even when you feel better.    Drink plenty of water, hot tea, and other liquids as directed by the healthcare provider. This may help thin nasal mucus. It also may help your sinuses drain fluids.    Heat may help soothe painful areas of your face. Use a towel soaked in hot water. Or,  the shower and direct the warm spray onto your face. Using a vaporizer along with a menthol rub at night may also help soothe symptoms.     An expectorant with guaifenesin may help thin nasal mucus and help your sinuses drain fluids. Talk with your provider or pharmacists before taking an over-the-counter (OTC) medicine if you have any questions about it or its side effects..    You can use an OTC decongestant, unless a similar medicine was prescribed to you. Nasal sprays work the fastest. Use one that contains phenylephrine or oxymetazoline. First blow your nose gently. Then use the spray. Don't use these medicines more often than directed on the label. If you do, your symptoms may get worse. You may also take pills that contain pseudoephedrine. Don t use products that combine multiple medicines. This is because side effects may be increased. Read labels. You can also ask the pharmacist for help. (People  with high blood pressure should not use decongestants. They can raise blood pressure.) Talk with your provider or pharmacist if you have any questions about the medicine..    OTC antihistamines may help if allergies contributed to your sinusitis. Talk with your provider or pharmacist if you have any questions about the medicine..    Don't use nasal rinses or irrigation during an acute sinus infection, unless your healthcare provider tells you to. Rinsing may spread the infection to other areas in your sinuses.    Use acetaminophen or ibuprofen to control pain, unless another pain medicine was prescribed to you. If you have chronic liver or kidney disease or ever had a stomach ulcer, talk with your healthcare provider before using these medicines. Never give aspirin to anyone under age 18 who is ill with a fever. It may cause severe liver damage.    Don't smoke. This can make symptoms worse.    Follow-up care  Follow up with your healthcare provider, or as advised.   When to seek medical advice  Call your healthcare provider if any of these occur:     Facial pain or headache that gets worse    Stiff neck    Unusual drowsiness or confusion    Swelling of your forehead or eyelids    Symptoms don't go away in 10 days    Vision problems, such as blurred or double vision    Fever of 100.4 F (38 C) or higher, or as directed by your healthcare provider  Call 911  Call 911 if any of these occur:     Seizure    Trouble breathing    Feeling dizzy or faint    Fingernails, skin or lips look blue, purple , or gray  Prevention  Here are steps you can take to help prevent an infection:     Keep good hand washing habits.    Don t have close contact with people who have sore throats, colds, or other upper respiratory infections.    Don t smoke, and stay away from secondhand smoke.    Stay up to date with of your vaccines.  [employer] last reviewed this educational content on 12/1/2019 2000-2021 [employer]. All rights  reserved. This information is not intended as a substitute for professional medical care. Always follow your healthcare professional's instructions.

## 2022-03-04 ENCOUNTER — HOSPITAL ENCOUNTER (OUTPATIENT)
Dept: MAMMOGRAPHY | Facility: CLINIC | Age: 42
Discharge: HOME | End: 2022-03-04
Admitting: NURSE PRACTITIONER
Payer: COMMERCIAL

## 2022-03-04 DIAGNOSIS — Z12.31 VISIT FOR SCREENING MAMMOGRAM: ICD-10-CM

## 2022-03-04 PROCEDURE — 77067 SCR MAMMO BI INCL CAD: CPT

## 2022-04-14 ENCOUNTER — OFFICE VISIT (OUTPATIENT)
Dept: OBGYN | Facility: CLINIC | Age: 42
End: 2022-04-14
Payer: COMMERCIAL

## 2022-04-14 VITALS
HEART RATE: 81 BPM | TEMPERATURE: 97.9 F | SYSTOLIC BLOOD PRESSURE: 115 MMHG | DIASTOLIC BLOOD PRESSURE: 70 MMHG | BODY MASS INDEX: 19.39 KG/M2 | HEIGHT: 64 IN | RESPIRATION RATE: 16 BRPM | WEIGHT: 113.6 LBS

## 2022-04-14 DIAGNOSIS — N92.1 MENOMETRORRHAGIA: Primary | ICD-10-CM

## 2022-04-14 LAB
ERYTHROCYTE [DISTWIDTH] IN BLOOD BY AUTOMATED COUNT: 12.9 % (ref 10–15)
HCT VFR BLD AUTO: 40.4 % (ref 35–47)
HGB BLD-MCNC: 12.9 G/DL (ref 11.7–15.7)
MCH RBC QN AUTO: 27 PG (ref 26.5–33)
MCHC RBC AUTO-ENTMCNC: 31.9 G/DL (ref 31.5–36.5)
MCV RBC AUTO: 85 FL (ref 78–100)
PLATELET # BLD AUTO: 324 10E3/UL (ref 150–450)
PROLACTIN SERPL-MCNC: 4 UG/L (ref 3–27)
RBC # BLD AUTO: 4.77 10E6/UL (ref 3.8–5.2)
T4 FREE SERPL-MCNC: 0.89 NG/DL (ref 0.76–1.46)
TSH SERPL DL<=0.005 MIU/L-ACNC: 1.37 MU/L (ref 0.4–4)
WBC # BLD AUTO: 7.2 10E3/UL (ref 4–11)

## 2022-04-14 PROCEDURE — 84146 ASSAY OF PROLACTIN: CPT | Performed by: OBSTETRICS & GYNECOLOGY

## 2022-04-14 PROCEDURE — 99214 OFFICE O/P EST MOD 30 MIN: CPT | Performed by: OBSTETRICS & GYNECOLOGY

## 2022-04-14 PROCEDURE — 84443 ASSAY THYROID STIM HORMONE: CPT | Performed by: OBSTETRICS & GYNECOLOGY

## 2022-04-14 PROCEDURE — 36415 COLL VENOUS BLD VENIPUNCTURE: CPT | Performed by: OBSTETRICS & GYNECOLOGY

## 2022-04-14 PROCEDURE — 84439 ASSAY OF FREE THYROXINE: CPT | Performed by: OBSTETRICS & GYNECOLOGY

## 2022-04-14 PROCEDURE — 85027 COMPLETE CBC AUTOMATED: CPT | Performed by: OBSTETRICS & GYNECOLOGY

## 2022-04-14 NOTE — PROGRESS NOTES
Source: patient/chart  CC: irregular menses  María is a 41 year old  here for consultation at the request of self for heavy irregular menses.  Days 1-2 are heavy changing a pad/tampon every 1-2 hours with day 3 hardly anything.  Then periods resume days 3-7 that are moderately heavy.  Not as crampy as before.  Has had menses that showed up early.      Stopped breastfeeding in 2021.  Menses had already resumed by then.  Denies milky nipple discharge at this time.     ROS: Ten point review of systems was reviewed and negative except the above.    Patient Active Problem List   Diagnosis     Endometriosis     Other specified hypothyroidism     Past Medical History:   Diagnosis Date     Chickenpox      Female infertility 2014    History of Stage III endometriosis by laparoscopy on 4/15/14  Prior Successful IVF      Incisional hernia, without obstruction or gangrene 10/10/2019     Palpitations 2008     Pes anserinus tendinitis or bursitis 10/24/2012     Urinary tract bacterial infections     as chold     Past Surgical History:   Procedure Laterality Date      SECTION N/A 10/12/2015    Procedure:  SECTION;  Surgeon: Jayshree Garcia MD;  [facility]      SECTION N/A 2018    Procedure:  Section;  Surgeon: Jayshree Garcia MD;  [facility]      SECTION N/A 2020    Procedure:  SECTION;  Surgeon: Jayshree Garcia MD;  [facility]     HERNIORRHAPHY INCISIONAL (LOCATION) N/A 2019    Procedure: Incisional hernia repair;  Surgeon: Tani Sinclair DO;  [facility]     LAPAROSCOPIC ABLATION ENDOMETRIOSIS  4/15/2014    Procedure: LAPAROSCOPIC ABLATION ENDOMETRIOSIS;  Surgeon: Mohamud Osorio MD;  [facility]     LAPAROSCOPIC TUBAL DYE STUDY  4/15/2014    Procedure: LAPAROSCOPIC TUBAL DYE STUDY;  Surgeon: Mohamud Osorio MD;  [facility]     LAPAROSCOPY DIAGNOSTIC (GYN)  4/15/2014    Procedure: Operative  "Laparoscopy, Excision Cauterization Of Endometriosis, Chromopertubation ;  Surgeon: Mohamud Osorio MD;  [facility]     SALPINGECTOMY Bilateral 9/16/2020    Procedure: SALPINGECTOMY;  Surgeon: Jayshree Garcia MD;  [facility]     [facility] ORAL SURGERY PROCEDURE      wisdom teeth       ALL/Meds: Her medication and allergy histories were reviewed and are documented in their appropriate chart areas.    Social History     Tobacco Use     Smoking status: Never Smoker     Smokeless tobacco: Never Used   Vaping Use     Vaping Use: Never used   Substance Use Topics     Alcohol use: Yes     Comment: social     Drug use: No       FH: Her family history was reviewed and documented in its appropriate chart area.    PE: /70 (BP Location: Left arm, Patient Position: Chair, Cuff Size: Adult Regular)   Pulse 81   Temp 97.9  F (36.6  C) (Tympanic)   Resp 16   Ht 1.613 m (5' 3.5\")   Wt 51.5 kg (113 lb 9.6 oz)   LMP 03/21/2022   Breastfeeding No   BMI 19.81 kg/m    Body mass index is 19.81 kg/m .    General Appearance:  healthy, alert, active, no distress  HEENT: NCAT    A/P     ICD-10-CM    1. Menometrorrhagia  N92.1 TSH     T4, free     CBC with platelets     Prolactin     US Pelvic Complete with Transvaginal     TSH     T4, free     CBC with platelets     Prolactin       1. Discussed work up includes thyroid and prolactin assessment, possible endometrial biopsy, and pelvic ultrasound.  Check CBC for anemia.  Reviewed risks and benefits of medical versus surgical therapy.  Medical therapy reviewed included hormonal manipulation with OCP's, Patch, Ring, Depo, or IUD.   Reviewed endometrial ablation versus hysterectomy.  Discussed that endometrial ablation is minimally invasive compared to hysterectomy but may not be definitive.  Patient is considering all options but will start with the work up.     Jayshree Garcia M.D.    30 minutes spent on the date of the encounter doing chart review, patient visit and " documentation      Patient instructions given:   There are no Patient Instructions on file for this visit.

## 2022-04-14 NOTE — PATIENT INSTRUCTIONS
Hormones:    Estrogen+progesterone: pill (daily), patch (weekly), ring (monthly)  Progesterone only: shot, criselda in arm, IUD    Surgeries:     ablation or hysterectomy (big deal!)    No hormone/decrease bleeding: Lysteda

## 2022-04-14 NOTE — NURSING NOTE
"Initial /70 (BP Location: Left arm, Patient Position: Chair, Cuff Size: Adult Regular)   Pulse 81   Temp 97.9  F (36.6  C) (Tympanic)   Resp 16   Ht 1.613 m (5' 3.5\")   Wt 51.5 kg (113 lb 9.6 oz)   LMP 03/21/2022   Breastfeeding No   BMI 19.81 kg/m   Estimated body mass index is 19.81 kg/m  as calculated from the following:    Height as of this encounter: 1.613 m (5' 3.5\").    Weight as of this encounter: 51.5 kg (113 lb 9.6 oz). .    Bri Camejo, ALTAGRACIA    "

## 2022-05-09 ENCOUNTER — HOSPITAL ENCOUNTER (OUTPATIENT)
Dept: ULTRASOUND IMAGING | Facility: CLINIC | Age: 42
Discharge: HOME | End: 2022-05-09
Attending: OBSTETRICS & GYNECOLOGY | Admitting: OBSTETRICS & GYNECOLOGY
Payer: COMMERCIAL

## 2022-05-09 DIAGNOSIS — N92.1 MENOMETRORRHAGIA: ICD-10-CM

## 2022-05-09 PROCEDURE — 76856 US EXAM PELVIC COMPLETE: CPT

## 2022-07-26 ENCOUNTER — MYC MEDICAL ADVICE (OUTPATIENT)
Dept: OBGYN | Facility: CLINIC | Age: 42
End: 2022-07-26

## 2022-07-26 DIAGNOSIS — N92.1 MENOMETRORRHAGIA: ICD-10-CM

## 2022-07-26 RX ORDER — DROSPIRENONE AND ETHINYL ESTRADIOL 0.02-3(28)
1 KIT ORAL DAILY
Qty: 90 TABLET | Refills: 3 | Status: SHIPPED | OUTPATIENT
Start: 2022-07-26 | End: 2023-08-10

## 2022-07-26 NOTE — TELEPHONE ENCOUNTER
Last Written Prescription Date:  5/21/2022  Last Fill Quantity: 90,  # refills: 0   Last office visit: 4/14/2022 with prescribing provider:  Dr. Garcia   Future Office Visit:  Currently not scheduled.    Please review and advise.  Patient has been checking BP's   Running 110-115/70's    Thank you.    Lor Rand   [facility]  RN

## 2022-09-23 ENCOUNTER — IMMUNIZATION (OUTPATIENT)
Dept: FAMILY MEDICINE | Facility: CLINIC | Age: 42
End: 2022-09-23
Payer: COMMERCIAL

## 2022-09-23 PROCEDURE — 0134A COVID-19,PF,MODERNA BIVALENT: CPT

## 2022-09-23 PROCEDURE — 91313 COVID-19,PF,MODERNA BIVALENT: CPT

## 2022-10-04 ENCOUNTER — OFFICE VISIT (OUTPATIENT)
Dept: FAMILY MEDICINE | Facility: CLINIC | Age: 42
End: 2022-10-04
Payer: COMMERCIAL

## 2022-10-04 VITALS
WEIGHT: 114.4 LBS | OXYGEN SATURATION: 100 % | DIASTOLIC BLOOD PRESSURE: 84 MMHG | HEIGHT: 64 IN | BODY MASS INDEX: 19.53 KG/M2 | HEART RATE: 67 BPM | TEMPERATURE: 97.8 F | SYSTOLIC BLOOD PRESSURE: 118 MMHG

## 2022-10-04 DIAGNOSIS — Z13.6 CARDIOVASCULAR SCREENING; LDL GOAL LESS THAN 130: ICD-10-CM

## 2022-10-04 DIAGNOSIS — Z00.00 ROUTINE PHYSICAL EXAMINATION: Primary | ICD-10-CM

## 2022-10-04 LAB
ANION GAP SERPL CALCULATED.3IONS-SCNC: 10 MMOL/L (ref 7–15)
BUN SERPL-MCNC: 8.9 MG/DL (ref 6–20)
CALCIUM SERPL-MCNC: 8.6 MG/DL (ref 8.6–10)
CHLORIDE SERPL-SCNC: 100 MMOL/L (ref 98–107)
CHOLEST SERPL-MCNC: 166 MG/DL
CREAT SERPL-MCNC: 0.61 MG/DL (ref 0.51–0.95)
DEPRECATED HCO3 PLAS-SCNC: 26 MMOL/L (ref 22–29)
GFR SERPL CREATININE-BSD FRML MDRD: >90 ML/MIN/1.73M2
GLUCOSE SERPL-MCNC: 78 MG/DL (ref 70–99)
HDLC SERPL-MCNC: 57 MG/DL
LDLC SERPL CALC-MCNC: 89 MG/DL
NONHDLC SERPL-MCNC: 109 MG/DL
POTASSIUM SERPL-SCNC: 3.8 MMOL/L (ref 3.4–5.3)
SODIUM SERPL-SCNC: 136 MMOL/L (ref 136–145)
TRIGL SERPL-MCNC: 102 MG/DL

## 2022-10-04 PROCEDURE — 36415 COLL VENOUS BLD VENIPUNCTURE: CPT | Performed by: NURSE PRACTITIONER

## 2022-10-04 PROCEDURE — 90471 IMMUNIZATION ADMIN: CPT | Performed by: NURSE PRACTITIONER

## 2022-10-04 PROCEDURE — 99396 PREV VISIT EST AGE 40-64: CPT | Mod: 25 | Performed by: NURSE PRACTITIONER

## 2022-10-04 PROCEDURE — 90686 IIV4 VACC NO PRSV 0.5 ML IM: CPT | Performed by: NURSE PRACTITIONER

## 2022-10-04 PROCEDURE — 80048 BASIC METABOLIC PNL TOTAL CA: CPT | Performed by: NURSE PRACTITIONER

## 2022-10-04 PROCEDURE — 80061 LIPID PANEL: CPT | Performed by: NURSE PRACTITIONER

## 2022-10-04 ASSESSMENT — ENCOUNTER SYMPTOMS
MYALGIAS: 0
NERVOUS/ANXIOUS: 0
SORE THROAT: 0
PARESTHESIAS: 0
DIZZINESS: 0
ARTHRALGIAS: 0
SHORTNESS OF BREATH: 0
CONSTIPATION: 0
NAUSEA: 0
HEARTBURN: 0
DIARRHEA: 0
PALPITATIONS: 0
COUGH: 0
FEVER: 0
HEMATOCHEZIA: 0
FREQUENCY: 0
DYSURIA: 0
EYE PAIN: 0
WEAKNESS: 0
BREAST MASS: 0
JOINT SWELLING: 0
CHILLS: 0
HEMATURIA: 0
ABDOMINAL PAIN: 0
HEADACHES: 0

## 2022-10-04 NOTE — PROGRESS NOTES
SUBJECTIVE:   CC: María is an 42 year old who presents for preventive health visit.       Patient has been advised of split billing requirements and indicates understanding: Yes  Healthy Habits:     Getting at least 3 servings of Calcium per day:  Yes    Bi-annual eye exam:  Yes    Dental care twice a year:  Yes    Sleep apnea or symptoms of sleep apnea:  None    Diet:  Regular (no restrictions)    Frequency of exercise:  2-3 days/week    Duration of exercise:  30-45 minutes    Taking medications regularly:  Yes    Medication side effects:  None    PHQ-2 Total Score: 0    Additional concerns today:  No        Today's PHQ-2 Score:   PHQ-2 ( 1999 [employer]) 10/4/2022   Q1: Little interest or pleasure in doing things 0   Q2: Feeling down, depressed or hopeless 0   PHQ-2 Score 0   PHQ-2 Total Score (12-17 Years)- Positive if 3 or more points; Administer PHQ-A if positive -   Q1: Little interest or pleasure in doing things Not at all   Q2: Feeling down, depressed or hopeless Not at all   PHQ-2 Score 0       Abuse: Current or Past (Physical, Sexual or Emotional) - No  Do you feel safe in your environment? Yes        Social History     Tobacco Use     Smoking status: Never Smoker     Smokeless tobacco: Never Used   Substance Use Topics     Alcohol use: Yes     Comment: social     If you drink alcohol do you typically have >3 drinks per day or >7 drinks per week? No    Alcohol Use 10/4/2022   Prescreen: >3 drinks/day or >7 drinks/week? No   Prescreen: >3 drinks/day or >7 drinks/week? -       Reviewed orders with patient.  Reviewed health maintenance and updated orders accordingly - Yes  BP Readings from Last 3 Encounters:   10/04/22 118/84   04/14/22 115/70   10/29/21 125/78    Wt Readings from Last 3 Encounters:   10/04/22 51.9 kg (114 lb 6.4 oz)   04/14/22 51.5 kg (113 lb 9.6 oz)   10/29/21 50.7 kg (111 lb 12.8 oz)                  Patient Active Problem List   Diagnosis     Endometriosis     Other specified  hypothyroidism     Past Surgical History:   Procedure Laterality Date      SECTION N/A 10/12/2015    Procedure:  SECTION;  Surgeon: Jayshree Garcia MD;  [facility]      SECTION N/A 2018    Procedure:  Section;  Surgeon: Jayshree Garcia MD;  [facility]      SECTION N/A 2020    Procedure:  SECTION;  Surgeon: Jayshree Garcia MD;  [facility]     HERNIORRHAPHY INCISIONAL (LOCATION) N/A 2019    Procedure: Incisional hernia repair;  Surgeon: Tani Sinclair DO;  [facility]     LAPAROSCOPIC ABLATION ENDOMETRIOSIS  4/15/2014    Procedure: LAPAROSCOPIC ABLATION ENDOMETRIOSIS;  Surgeon: Mohamud Osorio MD;  [facility]     LAPAROSCOPIC TUBAL DYE STUDY  4/15/2014    Procedure: LAPAROSCOPIC TUBAL DYE STUDY;  Surgeon: Mohamud Osorio MD;  [facility]     LAPAROSCOPY DIAGNOSTIC (GYN)  4/15/2014    Procedure: Operative Laparoscopy, Excision Cauterization Of Endometriosis, Chromopertubation ;  Surgeon: Mohamud Osorio MD;  [facility]     SALPINGECTOMY Bilateral 2020    Procedure: SALPINGECTOMY;  Surgeon: Jayshree Garcia MD;  [facility]: [facility]     [facility] ORAL SURGERY PROCEDURE      wisdom teeth       Social History     Tobacco Use     Smoking status: Never Smoker     Smokeless tobacco: Never Used   Substance Use Topics     Alcohol use: Yes     Comment: social     Family History   Problem Relation Age of Onset     Lipids Father      Hypertension Father      Cardiovascular Father         A fib     Cancer Maternal Grandmother         ovarian      Diabetes Maternal Grandfather      Heart Disease Maternal Grandfather      Cancer Maternal Grandfather         skin     Cardiovascular Maternal Grandfather         MI     Skin Cancer Maternal Grandfather      Thyroid Disease Brother         ?     Cancer Mother         leukemia     Bleeding Disorder Mother         anemia     Depression Mother      Cardiovascular Paternal  Grandfather         MI     Genitourinary Problems Paternal Grandmother         kidney removed     Skin Cancer Maternal Aunt          Current Outpatient Medications   Medication Sig Dispense Refill     drospirenone-ethinyl estradiol (PADMINI) 3-0.02 MG tablet Take 1 tablet by mouth daily 90 tablet 3       Breast Cancer Screening:    Breast CA Risk Assessment (FHS-7) 10/29/2021   Do you have a family history of breast, colon, or ovarian cancer? No / Unknown         Mammogram Screening - Offered annual screening and updated Health Maintenance for [place] plan based on risk factor consideration    Pertinent mammograms are reviewed under the imaging tab.    History of abnormal Pap smear: NO - age 30-65 PAP every 5 years with negative HPV co-testing recommended  PAP / HPV Latest Ref Rng & Units 1/3/2020 10/24/2016 10/11/2011   PAP (Historical) - NIL NIL NIL   HPV16 NEG:Negative Negative Negative -   HPV18 NEG:Negative Negative Negative -   HRHPV NEG:Negative Negative Negative -     Reviewed and updated as needed this visit by clinical staff   Tobacco  Allergies  Meds   Med Hx  Surg Hx  Fam Hx  Soc Hx          Reviewed and updated as needed this visit by Provider                       Review of Systems   Constitutional: Negative for chills and fever.   HENT: Negative for congestion, ear pain, hearing loss and sore throat.    Eyes: Negative for pain and visual disturbance.   Respiratory: Negative for cough and shortness of breath.    Cardiovascular: Negative for chest pain, palpitations and peripheral edema.   Gastrointestinal: Negative for abdominal pain, constipation, diarrhea, heartburn, hematochezia and nausea.   Breasts:  Negative for tenderness, breast mass and discharge.   Genitourinary: Negative for dysuria, frequency, genital sores, hematuria, pelvic pain, urgency, vaginal bleeding and vaginal discharge.   Musculoskeletal: Negative for arthralgias, joint swelling and myalgias.   Skin: Negative for rash.  "  Neurological: Negative for dizziness, weakness, headaches and paresthesias.   Psychiatric/Behavioral: Negative for mood changes. The patient is not nervous/anxious.           OBJECTIVE:   /84   Pulse 67   Temp 97.8  F (36.6  C) (Tympanic)   Ht 1.619 m (5' 3.75\")   Wt 51.9 kg (114 lb 6.4 oz)   SpO2 100%   BMI 19.79 kg/m    Physical Exam  GENERAL: healthy, alert and no distress  EYES: Eyes grossly normal to inspection, PERRL and conjunctivae and sclerae normal  HENT: normal cephalic/atraumatic, oropharynx clear and oral mucous membranes moist  NECK: no adenopathy, no asymmetry, masses, or scars and thyroid normal to palpation  RESP: lungs clear to auscultation - no rales, rhonchi or wheezes  CV: regular rates and rhythm, normal S1 S2, no S3 or S4, no murmur, click or rub and no peripheral edema  ABDOMEN: soft, nontender, without hepatosplenomegaly or masses and no palpable or pulsatile masses  MS: no gross musculoskeletal defects noted, no edema  NEURO: Normal strength and tone, mentation intact and speech normal  PSYCH: mentation appears normal, affect normal/bright    Diagnostic Test Results:  Labs reviewed in Epic    ASSESSMENT/PLAN:   Diagnoses and all orders for this visit:    Routine physical examination  -     Basic metabolic panel  (Ca, Cl, CO2, Creat, Gluc, K, Na, BUN); Future  -     Basic metabolic panel  (Ca, Cl, CO2, Creat, Gluc, K, Na, BUN)    CARDIOVASCULAR SCREENING; LDL GOAL LESS THAN 130  -     Lipid panel reflex to direct LDL Fasting; Future  -     Lipid panel reflex to direct LDL Fasting    Other orders  -     INFLUENZA VACCINE IM > 6 MONTHS VALENT IIV4 (AFLURIA/FLUZONE)  -     REVIEW OF HEALTH MAINTENANCE PROTOCOL ORDERS            COUNSELING:  Reviewed preventive health counseling, as reflected in patient instructions    Estimated body mass index is 19.79 kg/m  as calculated from the following:    Height as of this encounter: 1.619 m (5' 3.75\").    Weight as of this encounter: 51.9 " kg (114 lb 6.4 oz).        She reports that she has never smoked. She has never used smokeless tobacco.      Counseling Resources:  ATP IV Guidelines  Pooled Cohorts Equation Calculator  Breast Cancer Risk Calculator  BRCA-Related Cancer Risk Assessment: FHS-7 Tool  FRAX Risk Assessment  ICSI Preventive Guidelines  Dietary Guidelines for Americans, 2010  USDA's MyPlate  ASA Prophylaxis  Lung CA Screening    RANDELL Vaughan [facility]

## 2022-10-05 ENCOUNTER — MYC MEDICAL ADVICE (OUTPATIENT)
Dept: FAMILY MEDICINE | Facility: CLINIC | Age: 42
End: 2022-10-05

## 2022-10-05 NOTE — RESULT ENCOUNTER NOTE
"Juana Daniel    Your lab results came back within normal limits. Please let us know if you have any questions.     Take care,    RANDELL Thurston CNP    TEST DESCRIPTIONS   CBC (Complete Blood Count) includes hemoglobin, hematocrit, white blood cells, etc. This test can be used to detect anemia, infection, and abnormalities in blood cells.   Cholesterol is one of the blood fats (lipids) and is the building block used by the body for cell wall and hormone production. Increased levels of cholesterol have been proven to directly contribute to heart disease and strokes.   Triglycerides are one of the blood fats (lipids) and are thought to be associated with heart disease. If you have had anything to eat or drink other than water for 12 hours before the test and your level is high, you should have the test repeated after a 12 hour fast. Abnormally high results may also be associated with diabetes, kidney and liver diseases.   LDL is the low density lipoprotein component of the cholesterol. It is the harmful substance that deposits cholesterol on the artery walls contributing to heart attacks and strokes. A high LDL level is associated with higher risk of coronary heart disease.   HDL stands for high density lipoprotein and refers to the so-called \"good\" cholesterol. HDL picks up excess cholesterol in the bloodstream and carries it back to the liver for disposal. Individuals with higher than average HDL seem to have a lower risk of coronary disease. Vigorous exercise will help increase the blood levels of HDL.   Risk Factor (Total cholesterol/HDL) is a commonly used ratio for cardiac risk assessment. Less than 5.0 for men and 4.4 for women is ideal.   Sodium is an electrolyte useful in diagnosis of dehydration, diabetes, hypertension, or other diseases involving electrolyte imbalance. It also preserves the balance between calcium and potassium to maintain normal heart action and equilibrium of the body.   Potassium is " also an electrolyte that works with sodium to regulate the body's water balance and normalize heart rhythm.   Glucose is a measure of blood sugar and is one of the tests for diabetes. If you have not been fasting, your level will often be high. A low glucose level may be a cause of weakness or dizziness. Blood sugar ranks with cholesterol as a causative factor in arteriosclerosis and heart attacks.   BUN & Creatinine are waste products excreted by the kidneys. A high BUN can be related to a high protein diet, heavy exercise, fever and infections, dehydration, kidney stones, and kidney disease. Creatinine elevation is less dependent on diet or exercise and better represents kidney impairment. Low values are not generally significant.   Calcium is a mineral in the blood controlled by the parathyroid glands and kidneys. It is important in the formation of bone, in muscle and nerve function, and in blood clotting. Disease of the parathyroid gland, diseased bones or kidneys, or defective absorption of calcium may cause abnormal levels from the intestine.   ALT, AST, Alk Phos are liver tests. Enzymes found in the liver as well as skeletal and cardiac muscle. Elevations can often be seen in alcoholism, liver or heart disease. Slightly abnormal values are not considered significant.   TSH stands for Thyroid Stimulating Hormone. A sensitive test used to determine how the thyroid gland is functioning. The thyroid gland produces hormones that control the body's metabolism. When too few hormones are produced (increased TSH), hypothyroidism occurs which can cause fatigue, sensitivity to cold, and weight gain - an overall slowing down of bodily functions. When too many thyroid hormones are produces (or decreased TSH), it creates a condition call hyperthyroidism (such as Graves' disease) which causes rapid heartbeat, weight loss, and dizziness, among other symptoms.   PSA stands for Prostate Specific Antigen. Elevated levels of  PSA can increase with trauma, infection, inflammation, or disease processes in the prostate such as BPH (Benigh Prostatic Hypertrophy) or cancer.   Glycohemoglobin or HgBA1C is a 3-month average of blood sugar

## 2022-10-06 NOTE — TELEPHONE ENCOUNTER
ABBEY Sheth - See My Chart message.   Was that form done?  I did not get a copy in my [facility] daily work and of course the scanning went down yesterday am.

## 2022-10-06 NOTE — TELEPHONE ENCOUNTER
Updated form completed, faxed back, copy in [facility] daily work, copy to scan, and original mailed back to pt.

## 2022-11-18 ENCOUNTER — OFFICE VISIT (OUTPATIENT)
Dept: DERMATOLOGY | Facility: CLINIC | Age: 42
End: 2022-11-18
Payer: COMMERCIAL

## 2022-11-18 DIAGNOSIS — D22.9 MULTIPLE BENIGN NEVI: ICD-10-CM

## 2022-11-18 DIAGNOSIS — L82.1 SEBORRHEIC KERATOSIS: ICD-10-CM

## 2022-11-18 DIAGNOSIS — D18.01 CHERRY ANGIOMA: ICD-10-CM

## 2022-11-18 DIAGNOSIS — L81.4 LENTIGO: Primary | ICD-10-CM

## 2022-11-18 PROCEDURE — 99213 OFFICE O/P EST LOW 20 MIN: CPT | Performed by: PHYSICIAN ASSISTANT

## 2022-11-18 ASSESSMENT — PAIN SCALES - GENERAL: PAINLEVEL: NO PAIN (0)

## 2022-11-18 NOTE — NURSING NOTE
Chief Complaint   Patient presents with     Skin Check     Left ear        There were no vitals filed for this visit.  Wt Readings from Last 1 Encounters:   10/04/22 51.9 kg (114 lb 6.4 oz)       Bri Webster LPN .................11/18/2022

## 2022-11-18 NOTE — LETTER
2022         RE: María Briones  [id number] [address] [place] [id number]        Dear Colleague,    Thank you for referring your patient, María Briones, to the [facility]. Please see a copy of my visit note below.    María Briones is an extremely pleasant 41 year old year old female patient here today for evaluation of moles on body. She denies any new or changing nevi.. Patient has no other skin complaints today.  Remainder of the HPI, Meds, PMH, Allergies, FH, and SH was reviewed in chart.    Pertinent Hx:   No personal history of skin cancer.   Past Medical History:   Diagnosis Date     Chickenpox      Female infertility 2014    History of Stage III endometriosis by laparoscopy on 4/15/14  Prior Successful IVF      Incisional hernia, without obstruction or gangrene 10/10/2019     Palpitations 2008     Pes anserinus tendinitis or bursitis 10/24/2012     Urinary tract bacterial infections     as chold       Past Surgical History:   Procedure Laterality Date      SECTION N/A 10/12/2015    Procedure:  SECTION;  Surgeon: Jayshree Garcia MD;  [facility]: [facility]      SECTION N/A 2018    Procedure:  Section;  Surgeon: Jayshree Garcia MD;  [facility]      SECTION N/A 2020    Procedure:  SECTION;  Surgeon: Jayshree Garcia MD;  [facility]     HERNIORRHAPHY INCISIONAL (LOCATION) N/A 2019    Procedure: Incisional hernia repair;  Surgeon: Tani Sinclair DO;  [facility]     LAPAROSCOPIC ABLATION ENDOMETRIOSIS  4/15/2014    Procedure: LAPAROSCOPIC ABLATION ENDOMETRIOSIS;  Surgeon: Mohamud Osorio MD;  [facility]     LAPAROSCOPIC TUBAL DYE STUDY  4/15/2014    Procedure: LAPAROSCOPIC TUBAL DYE STUDY;  Surgeon: Mohamud Osorio MD;  [facility]     LAPAROSCOPY DIAGNOSTIC (GYN)  4/15/2014    Procedure: Operative Laparoscopy, Excision Cauterization Of Endometriosis, Chromopertubation ;   Surgeon: Mohamud Osorio MD;  [facility]     SALPINGECTOMY Bilateral 9/16/2020    Procedure: SALPINGECTOMY;  Surgeon: Jayshree Garcia MD;  [facility]: [facility]     [facility] ORAL SURGERY PROCEDURE      wisdom teeth        Family History   Problem Relation Age of Onset     Lipids Father      Hypertension Father      Cardiovascular Father         A fib     Cancer Maternal Grandmother         ovarian      Diabetes Maternal Grandfather      Heart Disease Maternal Grandfather      Cancer Maternal Grandfather         skin     Cardiovascular Maternal Grandfather         MI     Skin Cancer Maternal Grandfather      Thyroid Disease Brother         ?     Cancer Mother         leukemia     Bleeding Disorder Mother         anemia     Depression Mother      Cardiovascular Paternal Grandfather         MI     Genitourinary Problems Paternal Grandmother         kidney removed     Skin Cancer Maternal Aunt        Social History     Socioeconomic History     Marital status:      Spouse name: Not on file     Number of children: Not on file     Years of education: Not on file     Highest education level: Not on file   Occupational History     Not on file   Tobacco Use     Smoking status: Never     Smokeless tobacco: Never   Vaping Use     Vaping Use: Never used   Substance and Sexual Activity     Alcohol use: Yes     Comment: social     Drug use: No     Sexual activity: Yes     Partners: Male     Birth control/protection: Female Surgical     Comment:  since 2005   Other Topics Concern     Parent/sibling w/ CABG, MI or angioplasty before 65F 55M? No   Social History Narrative     Not on file     Social Determinants of Health     Financial Resource Strain: Not on file   Food Insecurity: Not on file   Transportation Needs: Not on file   Physical Activity: Not on file   Stress: Not on file   Social Connections: Not on file   Intimate Partner Violence: Not on file   Housing Stability: Not on file       Outpatient Encounter  Medications as of 11/18/2022   Medication Sig Dispense Refill     drospirenone-ethinyl estradiol (PADMINI) 3-0.02 MG tablet Take 1 tablet by mouth daily 90 tablet 3     No facility-administered encounter medications on file as of 11/18/2022.             O:   NAD, WDWN, Alert & Oriented, Mood & Affect wnl, Vitals stable   Here today alone   There were no vitals taken for this visit.   General appearance normal   Vitals stable   Alert, oriented and in no acute distress     Brown macules on face   Stuck on papules and brown macules on trunk and ext   Red papules on trunk  Brown papules and macules with regular pigment network and borders on torso and extremities   Brown stuck on papule on left helix    The remainder of skin exam is normal       Eyes: Conjunctivae/lids:Normal     ENT: Lips: normal    MSK:Normal    Cardiovascular: peripheral edema none    Pulm: Breathing Normal    Neuro/Psych: Orientation:Alert and Orientedx3 ; Mood/Affect:normal     A/P:  1. Seborrheic keratosis, lentigo, angioma, benign nevi   BENIGN LESIONS DISCUSSED WITH PATIENT:  I discussed the specifics of tumor, prognosis, and genetics of benign lesions.  I explained that treatment of these lesions would be purely cosmetic and not medically neccessary.  I discussed with patient different removal options including excision, cautery and /or laser.    Nature and genetics of benign skin lesions dicussed with patient.  Signs and Symptoms of skin cancer discussed with patient.  ABCDEs of melanoma reviewed with patient.  Patient encouraged to perform monthly skin exams.  UV precautions reviewed with patient.  Risks of non-melanoma skin cancer discussed with patient   Return to clinic in one year or sooner if needed.         Again, thank you for allowing me to participate in the care of your patient.        Sincerely,        Susan Ballard PA-C

## 2022-11-18 NOTE — PROGRESS NOTES
María Briones is an extremely pleasant 41 year old year old female patient here today for evaluation of moles on body. She denies any new or changing nevi.. Patient has no other skin complaints today.  Remainder of the HPI, Meds, PMH, Allergies, FH, and SH was reviewed in chart.    Pertinent Hx:   No personal history of skin cancer.   Past Medical History:   Diagnosis Date     Chickenpox      Female infertility 2014    History of Stage III endometriosis by laparoscopy on 4/15/14  Prior Successful IVF      Incisional hernia, without obstruction or gangrene 10/10/2019     Palpitations 2008     Pes anserinus tendinitis or bursitis 10/24/2012     Urinary tract bacterial infections     as chold       Past Surgical History:   Procedure Laterality Date      SECTION N/A 10/12/2015    Procedure:  SECTION;  Surgeon: Jayshree Garcia MD;  Location: [facility]      SECTION N/A 2018    Procedure:  Section;  Surgeon: Jayshree Garcia MD;  Location: [facility]      SECTION N/A 2020    Procedure:  SECTION;  Surgeon: Jayshree Garcia MD;  Location: [facility]     HERNIORRHAPHY INCISIONAL (LOCATION) N/A 2019    Procedure: Incisional hernia repair;  Surgeon: Tani Sinclair DO;  Location: [facility]     LAPAROSCOPIC ABLATION ENDOMETRIOSIS  4/15/2014    Procedure: LAPAROSCOPIC ABLATION ENDOMETRIOSIS;  Surgeon: Mohamud Osorio MD;  Location: [facility]     LAPAROSCOPIC TUBAL DYE STUDY  4/15/2014    Procedure: LAPAROSCOPIC TUBAL DYE STUDY;  Surgeon: Mohamud Osorio MD;  [facility]: [facility]     LAPAROSCOPY DIAGNOSTIC (GYN)  4/15/2014    Procedure: Operative Laparoscopy, Excision Cauterization Of Endometriosis, Chromopertubation ;  Surgeon: Mohamud Osorio MD;  [facility]: [facility]     SALPINGECTOMY Bilateral 2020    Procedure: SALPINGECTOMY;  Surgeon: Jayshree Garcia MD;  Location: [facility]     [facility] ORAL SURGERY PROCEDURE      wisdom teeth        Family History   Problem Relation  Age of Onset     Lipids Father      Hypertension Father      Cardiovascular Father         A fib     Cancer Maternal Grandmother         ovarian      Diabetes Maternal Grandfather      Heart Disease Maternal Grandfather      Cancer Maternal Grandfather         skin     Cardiovascular Maternal Grandfather         MI     Skin Cancer Maternal Grandfather      Thyroid Disease Brother         ?     Cancer Mother         leukemia     Bleeding Disorder Mother         anemia     Depression Mother      Cardiovascular Paternal Grandfather         MI     Genitourinary Problems Paternal Grandmother         kidney removed     Skin Cancer Maternal Aunt        Social History     Socioeconomic History     Marital status:      Spouse name: Not on file     Number of children: Not on file     Years of education: Not on file     Highest education level: Not on file   Occupational History     Not on file   Tobacco Use     Smoking status: Never     Smokeless tobacco: Never   Vaping Use     Vaping Use: Never used   Substance and Sexual Activity     Alcohol use: Yes     Comment: social     Drug use: No     Sexual activity: Yes     Partners: Male     Birth control/protection: Female Surgical     Comment:  since 2005   Other Topics Concern     Parent/sibling w/ CABG, MI or angioplasty before 65F 55M? No   Social History Narrative     Not on file     Social Determinants of Health     Financial Resource Strain: Not on file   Food Insecurity: Not on file   Transportation Needs: Not on file   Physical Activity: Not on file   Stress: Not on file   Social Connections: Not on file   Intimate Partner Violence: Not on file   Housing Stability: Not on file       Outpatient Encounter Medications as of 11/18/2022   Medication Sig Dispense Refill     drospirenone-ethinyl estradiol (PADMINI) 3-0.02 MG tablet Take 1 tablet by mouth daily 90 tablet 3     No facility-administered encounter medications on file as of 11/18/2022.             O:   NAD,  WDWN, Alert & Oriented, Mood & Affect wnl, Vitals stable   Here today alone   There were no vitals taken for this visit.   General appearance normal   Vitals stable   Alert, oriented and in no acute distress     Brown macules on face   Stuck on papules and brown macules on trunk and ext   Red papules on trunk  Brown papules and macules with regular pigment network and borders on torso and extremities   Brown stuck on papule on left helix    The remainder of skin exam is normal       Eyes: Conjunctivae/lids:Normal     ENT: Lips: normal    MSK:Normal    Cardiovascular: peripheral edema none    Pulm: Breathing Normal    Neuro/Psych: Orientation:Alert and Orientedx3 ; Mood/Affect:normal     A/P:  1. Seborrheic keratosis, lentigo, angioma, benign nevi   BENIGN LESIONS DISCUSSED WITH PATIENT:  I discussed the specifics of tumor, prognosis, and genetics of benign lesions.  I explained that treatment of these lesions would be purely cosmetic and not medically neccessary.  I discussed with patient different removal options including excision, cautery and /or laser.    Nature and genetics of benign skin lesions dicussed with patient.  Signs and Symptoms of skin cancer discussed with patient.  ABCDEs of melanoma reviewed with patient.  Patient encouraged to perform monthly skin exams.  UV precautions reviewed with patient.  Risks of non-melanoma skin cancer discussed with patient   Return to clinic in one year or sooner if needed.

## 2022-12-30 ENCOUNTER — E-VISIT (OUTPATIENT)
Dept: URGENT CARE | Facility: CLINIC | Age: 42
End: 2022-12-30
Payer: COMMERCIAL

## 2022-12-30 DIAGNOSIS — J01.90 ACUTE SINUSITIS WITH SYMPTOMS > 10 DAYS: Primary | ICD-10-CM

## 2022-12-30 PROCEDURE — 99421 OL DIG E/M SVC 5-10 MIN: CPT | Performed by: INTERNAL MEDICINE

## 2022-12-30 NOTE — PATIENT INSTRUCTIONS
You may want to try a nasal lavage (also known as nasal irrigation). You can find over-the-counter products, such as Neti-Pot, at retail locations or make your own at home. Instructions for homemade nasal lavage and more information on the process are available online at http://www.aafp.org/[email or web address]/1115/p1121.html.      Sinusitis (Antibiotic Treatment)    The sinuses are air-filled spaces within the bones of the face. They connect to the inside of the nose. Sinusitis is an inflammation of the tissue that lines the sinuses. Sinusitis can occur during a cold. It can also happen due to allergies to pollens and other particles in the air. Sinusitis can cause symptoms of sinus congestion and a feeling of fullness. A sinus infection causes fever, headache, and facial pain. There is often green or yellow fluid draining from the nose or into the back of the throat (post-nasal drip). You have been given antibiotics to treat this condition.   Home care    Take the full course of antibiotics as instructed. Don't stop taking them, even when you feel better.    Drink plenty of water, hot tea, and other liquids as directed by the healthcare provider. This may help thin nasal mucus. It also may help your sinuses drain fluids.    Heat may help soothe painful areas of your face. Use a towel soaked in hot water. Or,  the shower and direct the warm spray onto your face. Using a vaporizer along with a menthol rub at night may also help soothe symptoms.     An expectorant with guaifenesin may help thin nasal mucus and help your sinuses drain fluids. Talk with your provider or pharmacists before taking an over-the-counter (OTC) medicine if you have any questions about it or its side effects..    You can use an OTC decongestant, unless a similar medicine was prescribed to you. Nasal sprays work the fastest. Use one that contains phenylephrine or oxymetazoline. First blow your nose gently. Then use the spray. Don't use these  medicines more often than directed on the label. If you do, your symptoms may get worse. You may also take pills that contain pseudoephedrine. Don t use products that combine multiple medicines. This is because side effects may be increased. Read labels. You can also ask the pharmacist for help. (People with high blood pressure should not use decongestants. They can raise blood pressure.) Talk with your provider or pharmacist if you have any questions about the medicine..    OTC antihistamines may help if allergies contributed to your sinusitis. Talk with your provider or pharmacist if you have any questions about the medicine..    Don't use nasal rinses or irrigation during an acute sinus infection, unless your healthcare provider tells you to. Rinsing may spread the infection to other areas in your sinuses.    Use acetaminophen or ibuprofen to control pain, unless another pain medicine was prescribed to you. If you have chronic liver or kidney disease or ever had a stomach ulcer, talk with your healthcare provider before using these medicines. Never give aspirin to anyone under age 18 who is ill with a fever. It may cause severe liver damage.    Don't smoke. This can make symptoms worse.    Follow-up care  Follow up with your healthcare provider, or as advised.   When to seek medical advice  Call your healthcare provider if any of these occur:     Facial pain or headache that gets worse    Stiff neck    Unusual drowsiness or confusion    Swelling of your forehead or eyelids    Symptoms don't go away in 10 days    Vision problems, such as blurred or double vision    Fever of 100.4 F (38 C) or higher, or as directed by your healthcare provider  Call 911  Call 911 if any of these occur:     Seizure    Trouble breathing    Feeling dizzy or faint    Fingernails, skin or lips look blue, purple , or gray  Prevention  Here are steps you can take to help prevent an infection:     Keep good hand washing habits.    Don t  have close contact with people who have sore throats, colds, or other upper respiratory infections.    Don t smoke, and stay away from secondhand smoke.    Stay up to date with of your vaccines.  ADOP last reviewed this educational content on 12/1/2019 2000-2021 [employer]. All rights reserved. This information is not intended as a substitute for professional medical care. Always follow your healthcare professional's instructions.        Dear María Briones    After reviewing your responses, I've been able to diagnose you with a sinus infection.      Based on your responses and diagnosis, I have prescribed Augmentin   to treat your symptoms. I have sent this to your pharmacy.?     It is also important to stay well hydrated, get lots of rest and take over-the-counter decongestants,?tylenol?or ibuprofen if you?are able to?take those medications per your primary care provider to help relieve discomfort.?     It is important that you take?all of?your prescribed medication even if your symptoms are improving after a few doses.? Taking?all of?your medicine helps prevent the symptoms from returning.?     If your symptoms worsen, you develop severe headache, vomiting, high fever (>102), or are not improving in 7 days, please contact your primary care provider for an appointment or visit any of our convenient Walk-in Care or Urgent Care Centers to be seen which can be found on our website?here.?     Thanks again for choosing?us?as your health care partner,?   ?  Bessy Ray MD?

## 2023-04-08 ENCOUNTER — HOSPITAL ENCOUNTER (OUTPATIENT)
Dept: MAMMOGRAPHY | Facility: CLINIC | Age: 43
Discharge: HOME | End: 2023-04-08
Admitting: RADIOLOGY
Payer: COMMERCIAL

## 2023-04-08 DIAGNOSIS — Z12.31 VISIT FOR SCREENING MAMMOGRAM: ICD-10-CM

## 2023-04-08 PROCEDURE — 77067 SCR MAMMO BI INCL CAD: CPT

## 2023-08-10 ENCOUNTER — OFFICE VISIT (OUTPATIENT)
Dept: FAMILY MEDICINE | Facility: CLINIC | Age: 43
End: 2023-08-10
Payer: COMMERCIAL

## 2023-08-10 VITALS
BODY MASS INDEX: 19.97 KG/M2 | TEMPERATURE: 98.3 F | WEIGHT: 117 LBS | HEART RATE: 59 BPM | HEIGHT: 64 IN | SYSTOLIC BLOOD PRESSURE: 114 MMHG | RESPIRATION RATE: 18 BRPM | DIASTOLIC BLOOD PRESSURE: 72 MMHG | OXYGEN SATURATION: 100 %

## 2023-08-10 DIAGNOSIS — R42 VERTIGO: Primary | ICD-10-CM

## 2023-08-10 PROCEDURE — 99213 OFFICE O/P EST LOW 20 MIN: CPT | Performed by: NURSE PRACTITIONER

## 2023-08-10 RX ORDER — MECLIZINE HYDROCHLORIDE 25 MG/1
25 TABLET ORAL 3 TIMES DAILY PRN
Qty: 30 TABLET | Refills: 0 | Status: SHIPPED | OUTPATIENT
Start: 2023-08-10 | End: 2023-09-22

## 2023-08-10 ASSESSMENT — PAIN SCALES - GENERAL: PAINLEVEL: NO PAIN (0)

## 2023-08-10 NOTE — PROGRESS NOTES
"  Assessment & Plan     Vertigo  Symptoms most representative of vertigo will start meclizine and physical therapy   - meclizine (ANTIVERT) 25 MG tablet; Take 1 tablet (25 mg) by mouth 3 times daily as needed for dizziness  - Physical Therapy Referral; Future    56}         RANDELL Hernandez CNP  [facility]    Jose Daniel is a 43 year old, presenting for the following health issues:  Vertigo      History of Present Illness       Reason for visit:  Vertigo  Symptom onset:  3-4 weeks ago  Symptoms include:  Was bent over picking something up and felt like everything was spinning.  Now feels like she is on a moving ship now- likes she's floating.  Symptom intensity:  Mild  Prior treatment description:  Dramamine    She eats 2-3 servings of fruits and vegetables daily.She consumes 0 sweetened beverage(s) daily.She exercises with enough effort to increase her heart rate 30 to 60 minutes per day.  She exercises with enough effort to increase her heart rate 5 days per week.   She is taking medications regularly.           Review of Systems   Constitutional, HEENT, cardiovascular, pulmonary, gi and gu systems are negative, except as otherwise noted.      Objective    /72 (Cuff Size: Adult Regular)   Pulse 59   Temp 98.3  F (36.8  C) (Tympanic)   Resp 18   Ht 1.619 m (5' 3.75\")   Wt 53.1 kg (117 lb)   LMP  (LMP Unknown)   SpO2 100%   BMI 20.24 kg/m    Body mass index is 20.24 kg/m .  Physical Exam   GENERAL: healthy, alert and no distress  EYES: Eyes grossly normal to inspection, PERRL and conjunctivae and sclerae normal  HENT: ear canals and TM's normal, nose and mouth without ulcers or lesions  NECK: no adenopathy, no asymmetry, masses, or scars and thyroid normal to palpation  RESP: lungs clear to auscultation - no rales, rhonchi or wheezes  CV: regular rate and rhythm, normal S1 S2, no S3 or S4, no murmur, click or rub, no peripheral edema and peripheral pulses " strong  MS: no gross musculoskeletal defects noted, no edema  SKIN: no suspicious lesions or rashes  NEURO: Normal strength and tone, mentation intact and speech normal  PSYCH: mentation appears normal, affect normal/bright    No results found for any visits on 08/10/23.

## 2023-08-10 NOTE — NURSING NOTE
"Chief Complaint   Patient presents with    Vertigo     /72 (Cuff Size: Adult Regular)   Pulse 59   Temp 98.3  F (36.8  C) (Tympanic)   Resp 18   Ht 1.619 m (5' 3.75\")   Wt 53.1 kg (117 lb)   LMP  (LMP Unknown)   SpO2 100%   BMI 20.24 kg/m   Estimated body mass index is 20.24 kg/m  as calculated from the following:    Height as of this encounter: 1.619 m (5' 3.75\").    Weight as of this encounter: 53.1 kg (117 lb).  Patient presents to the clinic using No DME      Health Maintenance that is potentially due pending provider review:    Health Maintenance Due   Topic Date Due    HEPATITIS B IMMUNIZATION (1 of 3 - 3-dose series) Never done                  "

## 2023-08-24 ENCOUNTER — THERAPY VISIT (OUTPATIENT)
Dept: PHYSICAL THERAPY | Facility: CLINIC | Age: 43
End: 2023-08-24
Attending: NURSE PRACTITIONER
Payer: COMMERCIAL

## 2023-08-24 DIAGNOSIS — R42 VERTIGO: ICD-10-CM

## 2023-08-24 PROCEDURE — 97112 NEUROMUSCULAR REEDUCATION: CPT | Mod: GP | Performed by: PHYSICAL THERAPIST

## 2023-08-24 PROCEDURE — 97161 PT EVAL LOW COMPLEX 20 MIN: CPT | Mod: GP | Performed by: PHYSICAL THERAPIST

## 2023-08-24 NOTE — PROGRESS NOTES
PHYSICAL THERAPY EVALUATION  Type of Visit: Evaluation    See electronic medical record for Abuse and Falls Screening details.    Subjective  About 3-4 weeks ago bent over and felt like everything was spinning. Symptoms were really bad right away so she took some dramamine immediately and felt better.  Next morning felt slighlty off still and took one more dramamine.  Symptoms have been improving slowly since then and she hasn't needed the dramamine but has lingering symptoms of feeling like she is on a moving ship and floating.   No changes to sound/hearing. No headaches or light sensitivities.   Driving in the car doesn't change anything.       Presenting condition or subjective complaint:  floating sensation/vertigo   Date of onset: 07/08/23    Relevant medical history:   none  Dates & types of surgery:      Prior diagnostic imaging/testing results:       Prior therapy history for the same diagnosis, illness or injury:        Prior Level of Function  Transfers:   Ambulation:   ADL:   IADL:     Living Environment  Social support:     Type of home:     Stairs to enter the home:         Ramp:     Stairs inside the home:         Help at home:    Equipment owned:       Employment:       Hobbies/Interests:      Patient goals for therapy:  improve floating sensation     Pain assessment:      Objective   OBSERVATION:     GAIT:   Level of [place]:   Assistive Device(s):   Gait Deviations: WNL      BALANCE:     SPECIAL TESTS  Functional Gait Assessment (FGA)      10 Meter Walk Test (Comfortable)     10 Meter Walk Test (Fast)     6 Minute Walk Test (6MWT)           Garcia Balance Scale (BBS)     5 Times Sit-to-Stand (5TSTS)       Dynamic Gait Index (DGI)  12 item DGI:  dizziness with horizontal and vertical head turns scored 2/3 on both.  Overall score 10/12   Timed Up and Go (TUG) - sec    Single Leg Stance Right (sec)    Single Leg Stance Left (sec)    Modified CTSIB Conditions (sec) Cond 1: 30 sec  Cond 2: 30  sec  Cond 4: 30 sec  Cond 5 : 10 sec    Romberg  (sec)    Sharpened Romberg (sec)    30 Second Sit to Stand (reps/height)            SENSATION:     REFLEXES:   COORDINATION:        VESTIBULAR EVALUATION  ADDITIONAL HISTORY:  Description of symptoms:   see subjective above,  floating sensation   Dizzy attacks:   Start:  7/8/2023   Last attack:  yesterday    Frequency of occurrences:   almost daily    Length of attack:   hours  Difficulty hearing:  no  Noise in ears?    no  Alleviates symptoms:   dramamine   Worsens symptoms:  bending over  Activities that bring on symptoms:   bending over, turning while walking or turning her head        Pertinent visual history: no changes  Pertinent history of current vestibular problem:   DHI:  24    Cervicogenic Screen    Neck ROM Normal ROM but rotation: mild spinning sensation,    flex/ext: spinning sensation with looking down,     SB: no symptoms    Vertebral Artery Test Normal   Alar Ligament Test    Transverse Ligament Test    Distraction    Neck Torsion Test (head still, body rotating)    Neck Torsion Test (head and body rotating)         Oculomotor Screen    Ocular ROM Normal   Smooth Pursuit Normal   Saccades Normal eye tracking but mild dizziness with eyes shifts horizontal    VOR Normal   VOR Cancellation Normal   Head Impulse Test Normal   Convergence Testing Normal        Infrared Goggle Exam Vestibular Suppressant in Last 24 Hours? No  Exam Completed With: Room light   Spontaneous Nystagmus Negative   Gaze Evoked Nystagmus Negative   Head Shake Horizontal Nystagmus Negative   Positional Testing    Supine Head-Hanging Test     Left Right   Noe-Hallpike Negative  mild floating sensation  Negative   Sidelying Test     HSCC Supine Roll Test Negative Negative   HSCC Forward Roll Test Negative    Hamden and Lean Test -  Sitting Erect    Hamden and Lean Test - Seated, Head Bent 60 Degrees Forward    Hamden and Lean Test - Seated, Head Bent Backwards       BPPV Canal(s):   BPPV Type:      Dynamic Visual Acuity (DVA)    Static Acuity (LogMar) Line 10    Horizontal Head Movement at 1 Hz (LogMar)    Horizontal Head Movement at 2 Hz (LogMar) Line 10 no symptoms           Assessment & Plan   CLINICAL IMPRESSIONS  Medical Diagnosis: vertigo    Treatment Diagnosis: vestibular dysfunction   Impression/Assessment: Patient is a 43 year old female with floating sensation complaints.  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  Symptoms improving for patient but she would benefit from further exercises to help progress her balance and VOR system back to baseline. The following significant findings have been identified: Instability, Dizziness, and Disequilibrium . These impairments interfere with their ability to perform self care tasks, recreational activities, household mobility, and community mobility as compared to previous level of function.     Clinical Decision Making (Complexity):  Clinical Presentation: Stable/Uncomplicated  Clinical Presentation Rationale: based on medical and personal factors listed in PT evaluation  Clinical Decision Making (Complexity): Low complexity    PLAN OF CARE  Treatment Interventions:  Interventions: Manual Therapy, Neuromuscular Re-education, Therapeutic Activity, Therapeutic Exercise, Self-Care/Home Management, Canalith Repositioning    Long Term Goals     PT Goal 1  Goal Identifier: 1  Goal Description: Patient will be able to walk with head turns up/down and left/right with no dizziness.  Target Date: 09/07/23  PT Goal 2  Goal Identifier: 2  Goal Description: Patient will report no feelings of floating or rocking while up moving around or sitting.  Target Date: 09/21/23  PT Goal 3  Goal Identifier: 3  Goal Description: Patient will report no dizziness with ADL's in order to return to previous level of function with improved safety.  Target Date:  10/05/23      Frequency of Treatment: every other week  Duration of Treatment: 6 weeks    Recommended Referrals to Other Professionals:   Education Assessment:   Learner/Method: Patient  Education Comments: educated on role of P and POC    Risks and benefits of evaluation/treatment have been explained.   Patient/Family/caregiver agrees with Plan of Care.     Evaluation Time:     PT Josseline Low Complexity Minutes (46487): 20       Signing Clinician: Alana Maguire PT

## 2023-09-22 ENCOUNTER — MYC MEDICAL ADVICE (OUTPATIENT)
Dept: FAMILY MEDICINE | Facility: CLINIC | Age: 43
End: 2023-09-22

## 2023-09-22 ENCOUNTER — OFFICE VISIT (OUTPATIENT)
Dept: FAMILY MEDICINE | Facility: CLINIC | Age: 43
End: 2023-09-22
Payer: COMMERCIAL

## 2023-09-22 ENCOUNTER — LAB (OUTPATIENT)
Dept: LAB | Facility: CLINIC | Age: 43
End: 2023-09-22
Payer: COMMERCIAL

## 2023-09-22 VITALS
BODY MASS INDEX: 19.63 KG/M2 | RESPIRATION RATE: 16 BRPM | HEIGHT: 64 IN | TEMPERATURE: 97.7 F | DIASTOLIC BLOOD PRESSURE: 74 MMHG | OXYGEN SATURATION: 98 % | HEART RATE: 53 BPM | SYSTOLIC BLOOD PRESSURE: 100 MMHG | WEIGHT: 115 LBS

## 2023-09-22 DIAGNOSIS — Z00.00 ROUTINE PHYSICAL EXAMINATION: Primary | ICD-10-CM

## 2023-09-22 DIAGNOSIS — Z13.6 CARDIOVASCULAR SCREENING; LDL GOAL LESS THAN 130: ICD-10-CM

## 2023-09-22 DIAGNOSIS — Z00.00 ROUTINE PHYSICAL EXAMINATION: ICD-10-CM

## 2023-09-22 LAB
CHOLEST SERPL-MCNC: 150 MG/DL
FASTING STATUS PATIENT QL REPORTED: YES
GLUCOSE SERPL-MCNC: 83 MG/DL (ref 70–99)
HDLC SERPL-MCNC: 59 MG/DL
LDLC SERPL CALC-MCNC: 84 MG/DL
NONHDLC SERPL-MCNC: 91 MG/DL
TRIGL SERPL-MCNC: 35 MG/DL

## 2023-09-22 PROCEDURE — 36415 COLL VENOUS BLD VENIPUNCTURE: CPT

## 2023-09-22 PROCEDURE — 80061 LIPID PANEL: CPT

## 2023-09-22 PROCEDURE — 90471 IMMUNIZATION ADMIN: CPT | Performed by: NURSE PRACTITIONER

## 2023-09-22 PROCEDURE — 99396 PREV VISIT EST AGE 40-64: CPT | Mod: 25 | Performed by: NURSE PRACTITIONER

## 2023-09-22 PROCEDURE — 82947 ASSAY GLUCOSE BLOOD QUANT: CPT

## 2023-09-22 PROCEDURE — 90686 IIV4 VACC NO PRSV 0.5 ML IM: CPT | Performed by: NURSE PRACTITIONER

## 2023-09-22 ASSESSMENT — ENCOUNTER SYMPTOMS
DYSURIA: 0
PALPITATIONS: 0
ABDOMINAL PAIN: 0
HEARTBURN: 0
EYE PAIN: 0
HEMATOCHEZIA: 0
HEADACHES: 0
CONSTIPATION: 0
MYALGIAS: 0
FEVER: 0
ARTHRALGIAS: 0
HEMATURIA: 0
NAUSEA: 0
DIZZINESS: 0
DIARRHEA: 0
JOINT SWELLING: 0
BREAST MASS: 0
COUGH: 0
WEAKNESS: 0
NERVOUS/ANXIOUS: 0
FREQUENCY: 0
CHILLS: 0
SORE THROAT: 0
PARESTHESIAS: 0
SHORTNESS OF BREATH: 0

## 2023-09-22 NOTE — PROGRESS NOTES
SUBJECTIVE:   CC: María is an 43 year old who presents for preventive health visit.       2023     8:13 AM   Additional Questions   Roomed by Danna LINDSEY       Healthy Habits:     Getting at least 3 servings of Calcium per day:  Yes    Bi-annual eye exam:  Yes    Dental care twice a year:  Yes    Sleep apnea or symptoms of sleep apnea:  None    Diet:  Regular (no restrictions)    Frequency of exercise:  4-5 days/week    Duration of exercise:  30-45 minutes    Taking medications regularly:  Not Applicable    Medication side effects:  Not applicable    Additional concerns today:  No          Social History     Tobacco Use    Smoking status: Never     Passive exposure: Never    Smokeless tobacco: Never   Substance Use Topics    Alcohol use: Yes     Comment: social             2023     8:11 AM   Alcohol Use   Prescreen: >3 drinks/day or >7 drinks/week? No     Reviewed orders with patient.  Reviewed health maintenance and updated orders accordingly -   BP Readings from Last 3 Encounters:   23 100/74   08/10/23 114/72   10/04/22 118/84    Wt Readings from Last 3 Encounters:   23 52.2 kg (115 lb)   08/10/23 53.1 kg (117 lb)   10/04/22 51.9 kg (114 lb 6.4 oz)                  Patient Active Problem List   Diagnosis    Endometriosis    Other specified hypothyroidism    Vertigo     Past Surgical History:   Procedure Laterality Date     SECTION N/A 10/12/2015    Procedure:  SECTION;  Surgeon: Jayshree Garcia MD;  [facility]: [facility]     SECTION N/A 2018    Procedure:  Section;  Surgeon: Jayshree Garcia MD;  Location: [facility]     SECTION N/A 2020    Procedure:  SECTION;  Surgeon: Jayshree Garcia MD;  Location: [facility]    HERNIORRHAPHY INCISIONAL (LOCATION) N/A 2019    Procedure: Incisional hernia repair;  Surgeon: Tani Sinclair DO;  [facility]: [facility]    LAPAROSCOPIC ABLATION ENDOMETRIOSIS  4/15/2014    Procedure: LAPAROSCOPIC ABLATION  ENDOMETRIOSIS;  Surgeon: Mohamud Osorio MD;  [facility]    LAPAROSCOPIC TUBAL DYE STUDY  4/15/2014    Procedure: LAPAROSCOPIC TUBAL DYE STUDY;  Surgeon: Mohamud Osorio MD;  [facility]    LAPAROSCOPY DIAGNOSTIC (GYN)  4/15/2014    Procedure: Operative Laparoscopy, Excision Cauterization Of Endometriosis, Chromopertubation ;  Surgeon: Mohamud Osorio MD;  [facility]    SALPINGECTOMY Bilateral 9/16/2020    Procedure: SALPINGECTOMY;  Surgeon: Jayshree Garcia MD;  Location: [facility] ORAL SURGERY PROCEDURE      wisdom teeth       Social History     Tobacco Use    Smoking status: Never     Passive exposure: Never    Smokeless tobacco: Never   Substance Use Topics    Alcohol use: Yes     Comment: social     Family History   Problem Relation Age of Onset    Lipids Father     Hypertension Father     Cardiovascular Father         A fib    Cancer Maternal Grandmother         ovarian     Diabetes Maternal Grandfather     Heart Disease Maternal Grandfather     Cancer Maternal Grandfather         skin    Cardiovascular Maternal Grandfather         MI    Skin Cancer Maternal Grandfather     Thyroid Disease Brother         ?    Cancer Mother         leukemia    Bleeding Disorder Mother         anemia    Depression Mother     Cardiovascular Paternal Grandfather         MI    Genitourinary Problems Paternal Grandmother         kidney removed    Skin Cancer Maternal Aunt          No current outpatient medications on file.       Breast Cancer Screening:        10/29/2021     8:29 AM   Breast CA Risk Assessment (FHS-7)   Do you have a family history of breast, colon, or ovarian cancer? No / Unknown         Mammogram Screening - Offered annual screening and updated Health Maintenance for mutual plan based on risk factor consideration  Pertinent mammograms are reviewed under the imaging tab.    History of abnormal Pap smear: NO - age 30-65 PAP every 5 years with negative HPV co-testing recommended      Latest Ref  "Rng & Units 1/3/2020     3:33 PM 10/24/2016     9:25 AM 10/24/2016    12:00 AM   PAP / HPV   PAP (Historical)  NIL   NIL    HPV 16 DNA NEG^Negative Negative  Negative     HPV 18 DNA NEG^Negative Negative  Negative     Other HR HPV NEG^Negative Negative  Negative       Reviewed and updated as needed this visit by clinical staff   Tobacco  Allergies  Meds              Reviewed and updated as needed this visit by Provider                     Review of Systems   Constitutional:  Negative for chills and fever.   HENT:  Negative for congestion, ear pain, hearing loss and sore throat.    Eyes:  Negative for pain and visual disturbance.   Respiratory:  Negative for cough and shortness of breath.    Cardiovascular:  Negative for chest pain, palpitations and peripheral edema.   Gastrointestinal:  Negative for abdominal pain, constipation, diarrhea, heartburn, hematochezia and nausea.   Breasts:  Negative for tenderness, breast mass and discharge.   Genitourinary:  Negative for dysuria, frequency, genital sores, hematuria, pelvic pain, urgency, vaginal bleeding and vaginal discharge.   Musculoskeletal:  Negative for arthralgias, joint swelling and myalgias.   Skin:  Negative for rash.   Neurological:  Negative for dizziness, weakness, headaches and paresthesias.   Psychiatric/Behavioral:  Negative for mood changes. The patient is not nervous/anxious.           OBJECTIVE:   /74   Pulse 53   Temp 97.7  F (36.5  C) (Tympanic)   Resp 16   Ht 1.613 m (5' 3.5\")   Wt 52.2 kg (115 lb)   LMP  (LMP Unknown)   SpO2 98%   BMI 20.05 kg/m    Physical Exam  GENERAL: healthy, alert and no distress  EYES: Eyes grossly normal to inspection and conjunctivae and sclerae normal  HENT: ear canals and TM's normal, nose and mouth without ulcers or lesions  NECK: no adenopathy, no asymmetry, masses, or scars and thyroid normal to palpation  RESP: lungs clear to auscultation - no rales, rhonchi or wheezes  CV: regular rates and rhythm, " normal S1 S2, no S3 or S4, no murmur, click or rub, and no peripheral edema  ABDOMEN: soft, nontender and no palpable or pulsatile masses  MS: no gross musculoskeletal defects noted, no edema  SKIN: no suspicious lesions or rashes  NEURO: Normal strength and tone, mentation intact and speech normal  PSYCH: mentation appears normal, affect normal/bright    Diagnostic Test Results:  Labs reviewed in Epic    ASSESSMENT/PLAN:   María was seen today for physical.    Diagnoses and all orders for this visit:    Routine physical examination  -     Glucose; Future    CARDIOVASCULAR SCREENING; LDL GOAL LESS THAN 130  -     Lipid panel reflex to direct LDL Fasting; Future    Other orders  -     INFLUENZA VACCINE IM > 6 MONTHS VALENT IIV4 (AFLURIA/FLUZONE)  -     REVIEW OF HEALTH MAINTENANCE PROTOCOL ORDERS            COUNSELING:  Reviewed preventive health counseling, as reflected in patient instructions        She reports that she has never smoked. She has never been exposed to tobacco smoke. She has never used smokeless tobacco.          RANDELL Vaughan CNP  [facility]

## 2023-09-25 NOTE — TELEPHONE ENCOUNTER
JOLEENE form completed. Faxed to [phone] and original mailed to PT.    Lidia Begum on 9/25/2023 at 1:45 PM  (Copy to scanning)

## 2023-09-25 NOTE — RESULT ENCOUNTER NOTE
"Juana Daniel    Your lab results came back within normal limits. Your form will be faxed today and mailed back to you. Please let us know if you have any questions.     Take care,    RANDELL Thurston CNP    TEST DESCRIPTIONS   CBC (Complete Blood Count) includes hemoglobin, hematocrit, white blood cells, etc. This test can be used to detect anemia, infection, and abnormalities in blood cells.   Cholesterol is one of the blood fats (lipids) and is the building block used by the body for cell wall and hormone production. Increased levels of cholesterol have been proven to directly contribute to heart disease and strokes.   Triglycerides are one of the blood fats (lipids) and are thought to be associated with heart disease. If you have had anything to eat or drink other than water for 12 hours before the test and your level is high, you should have the test repeated after a 12 hour fast. Abnormally high results may also be associated with diabetes, kidney and liver diseases.   LDL is the low density lipoprotein component of the cholesterol. It is the harmful substance that deposits cholesterol on the artery walls contributing to heart attacks and strokes. A high LDL level is associated with higher risk of coronary heart disease.   HDL stands for high density lipoprotein and refers to the so-called \"good\" cholesterol. HDL picks up excess cholesterol in the bloodstream and carries it back to the liver for disposal. Individuals with higher than average HDL seem to have a lower risk of coronary disease. Vigorous exercise will help increase the blood levels of HDL.   Risk Factor (Total cholesterol/HDL) is a commonly used ratio for cardiac risk assessment. Less than 5.0 for men and 4.4 for women is ideal.   Sodium is an electrolyte useful in diagnosis of dehydration, diabetes, hypertension, or other diseases involving electrolyte imbalance. It also preserves the balance between calcium and potassium to maintain normal " heart action and equilibrium of the body.   Potassium is also an electrolyte that works with sodium to regulate the body's water balance and normalize heart rhythm.   Glucose is a measure of blood sugar and is one of the tests for diabetes. If you have not been fasting, your level will often be high. A low glucose level may be a cause of weakness or dizziness. Blood sugar ranks with cholesterol as a causative factor in arteriosclerosis and heart attacks.   BUN & Creatinine are waste products excreted by the kidneys. A high BUN can be related to a high protein diet, heavy exercise, fever and infections, dehydration, kidney stones, and kidney disease. Creatinine elevation is less dependent on diet or exercise and better represents kidney impairment. Low values are not generally significant.   Calcium is a mineral in the blood controlled by the parathyroid glands and kidneys. It is important in the formation of bone, in muscle and nerve function, and in blood clotting. Disease of the parathyroid gland, diseased bones or kidneys, or defective absorption of calcium may cause abnormal levels from the intestine.   ALT, AST, Alk Phos are liver tests. Enzymes found in the liver as well as skeletal and cardiac muscle. Elevations can often be seen in alcoholism, liver or heart disease. Slightly abnormal values are not considered significant.   TSH stands for Thyroid Stimulating Hormone. A sensitive test used to determine how the thyroid gland is functioning. The thyroid gland produces hormones that control the body's metabolism. When too few hormones are produced (increased TSH), hypothyroidism occurs which can cause fatigue, sensitivity to cold, and weight gain - an overall slowing down of bodily functions. When too many thyroid hormones are produces (or decreased TSH), it creates a condition call hyperthyroidism (such as Graves' disease) which causes rapid heartbeat, weight loss, and dizziness, among other symptoms.   PSA  stands for Prostate Specific Antigen. Elevated levels of PSA can increase with trauma, infection, inflammation, or disease processes in the prostate such as BPH (Benigh Prostatic Hypertrophy) or cancer.   Glycohemoglobin or HgBA1C is a 3-month average of blood sugar

## 2023-10-03 ENCOUNTER — VIRTUAL VISIT (OUTPATIENT)
Dept: FAMILY MEDICINE | Facility: CLINIC | Age: 43
End: 2023-10-03
Payer: COMMERCIAL

## 2023-10-03 DIAGNOSIS — R05.1 ACUTE COUGH: Primary | ICD-10-CM

## 2023-10-03 PROCEDURE — 99214 OFFICE O/P EST MOD 30 MIN: CPT | Mod: VID | Performed by: PHYSICIAN ASSISTANT

## 2023-10-03 RX ORDER — FLUCONAZOLE 150 MG/1
150 TABLET ORAL ONCE
Qty: 1 TABLET | Refills: 0 | Status: SHIPPED | OUTPATIENT
Start: 2023-10-03 | End: 2023-10-03

## 2023-10-03 RX ORDER — ALBUTEROL SULFATE 90 UG/1
2 AEROSOL, METERED RESPIRATORY (INHALATION) EVERY 6 HOURS PRN
Qty: 18 G | Refills: 0 | Status: SHIPPED | OUTPATIENT
Start: 2023-10-03

## 2023-10-03 ASSESSMENT — ENCOUNTER SYMPTOMS
COUGH: 1
FEVER: 1

## 2023-10-03 NOTE — PROGRESS NOTES
María is a 43 year old who is being evaluated via a billable video visit.      How would you like to obtain your AVS? MyChart  If the video visit is dropped, the invitation should be resent by: Text to cell phone: [phone]  Will anyone else be joining your video visit? No          Assessment & Plan     Acute cough  -10days with new onset of fever.  Will treat for CAP/atypical pneumo  -Augmentin x 7d  -albuterol prn  -discussed alarm signs and symptoms to monitor for and discussed when to be reevaluated in the UC or ED   -to follow-up with PCP if sxs don't improve  - amoxicillin-clavulanate (AUGMENTIN) 875-125 MG tablet; Take 1 tablet by mouth 2 times daily for 7 days  - fluconazole (DIFLUCAN) 150 MG tablet; Take 1 tablet (150 mg) by mouth once for 1 dose  - albuterol (PROAIR HFA/PROVENTIL HFA/VENTOLIN HFA) 108 (90 Base) MCG/ACT inhaler; Inhale 2 puffs into the lungs every 6 hours as needed for shortness of breath, wheezing or cough    Gloria Martínez PA-C  [facility]   María is a 43 year old, presenting for the following health issues:  Cough (coughing for 11 or 12 days) and Fever      10/3/2023     3:25 PM   Additional Questions   Roomed by hser   Accompanied by self       -cough for the past 12 days  -COVID negative x 4  -fever 100F today  -no SOB some chest burning  -sputum is thick     -no hx of asthma but has had albuterol in the past    History of Present Illness       Reason for visit:  Cough, fever    She eats 2-3 servings of fruits and vegetables daily.She consumes 0 sweetened beverage(s) daily.She exercises with enough effort to increase her heart rate 9 or less minutes per day.  She exercises with enough effort to increase her heart rate 3 or less days per week.   She is taking medications regularly.       Review of Systems   Constitutional:  Positive for fever.   Respiratory:  Positive for cough.       Constitutional, HEENT, cardiovascular, pulmonary, gi  and gu systems are negative, except as otherwise noted.      Objective           Vitals:  No vitals were obtained today due to virtual visit.    Physical Exam   GENERAL: Healthy, alert and no distress  EYES: Eyes grossly normal to inspection.  No discharge or erythema, or obvious scleral/conjunctival abnormalities.  RESP: No audible wheeze, cough, or visible cyanosis.  No visible retractions or increased work of breathing.    SKIN: Visible skin clear. No significant rash, abnormal pigmentation or lesions.  NEURO: Cranial nerves grossly intact.  Mentation and speech appropriate for age.  PSYCH: Mentation appears normal, affect normal/bright, judgement and insight intact, normal speech and appearance well-groomed.                Video-Visit Details    Type of service:  Video Visit     Originating Location (pt. Location): Home    Distant Location (provider location):  On-site  Platform used for Video Visit: [employer]    Prior to immunization administration, verified patients identity using patient s name and date of birth. Please see Immunization Activity for additional information.     Screening Questionnaire for Adult Immunization    Are you sick today?   Yes   Do you have allergies to medications, food, a vaccine component or latex?   No   Have you ever had a serious reaction after receiving a vaccination?   No   Do you have a long-term health problem with heart, lung, kidney, or metabolic disease (e.g., diabetes), asthma, a blood disorder, no spleen, complement component deficiency, a cochlear implant, or a spinal fluid leak?  Are you on long-term aspirin therapy?   No   Do you have cancer, leukemia, HIV/AIDS, or any other immune system problem?   No   Do you have a parent, brother, or sister with an immune system problem?   No   In the past 3 months, have you taken medications that affect  your immune system, such as prednisone, other steroids, or anticancer drugs; drugs for the treatment of rheumatoid arthritis,  Crohn s disease, or psoriasis; or have you had radiation treatments?   No   Have you had a seizure, or a brain or other nervous system problem?   No   During the past year, have you received a transfusion of blood or blood    products, or been given immune (gamma) globulin or antiviral drug?   No   For women: Are you pregnant or is there a chance you could become       pregnant during the next month?   No   Have you received any vaccinations in the past 4 weeks?   No     Immunization questionnaire was positive for at least one answer.  Notified provider.      Patient instructed to remain in clinic for 15 minutes afterwards, and to report any adverse reactions.     Screening performed by Talia Mariee MA on 10/3/2023 at 3:29 PM.

## 2023-10-23 NOTE — PROGRESS NOTES
Physical Therapy Discharge Summary    María Briones has completed the ordered physical therapy evaluation. Treatment recommendations were made, but pt has not returned for any further recommended PT sessions past the initial evaluation.  Pt was unable to be re-assessed, and present status is unknown.    Please contact me with any questions or concerns.  Thank you for your referral.    Alana Maguire  PT, DPT       10/23/2023   [address] [address]   [email or web address]  Voicemail: [phone]

## 2023-11-17 ENCOUNTER — IMMUNIZATION (OUTPATIENT)
Dept: FAMILY MEDICINE | Facility: CLINIC | Age: 43
End: 2023-11-17
Payer: COMMERCIAL

## 2023-11-17 DIAGNOSIS — Z23 HIGH PRIORITY FOR 2019-NCOV VACCINE: Primary | ICD-10-CM

## 2023-11-17 PROCEDURE — 99207 PR NO CHARGE LOS: CPT

## 2023-11-17 PROCEDURE — 90480 ADMN SARSCOV2 VAC 1/ONLY CMP: CPT

## 2023-11-17 PROCEDURE — 91320 SARSCV2 VAC 30MCG TRS-SUC IM: CPT

## 2023-12-01 ENCOUNTER — OFFICE VISIT (OUTPATIENT)
Dept: DERMATOLOGY | Facility: CLINIC | Age: 43
End: 2023-12-01
Payer: COMMERCIAL

## 2023-12-01 DIAGNOSIS — D18.01 CHERRY ANGIOMA: ICD-10-CM

## 2023-12-01 DIAGNOSIS — D22.9 MULTIPLE BENIGN NEVI: ICD-10-CM

## 2023-12-01 DIAGNOSIS — L81.4 LENTIGO: Primary | ICD-10-CM

## 2023-12-01 DIAGNOSIS — L82.1 SEBORRHEIC KERATOSIS: ICD-10-CM

## 2023-12-01 PROCEDURE — 99213 OFFICE O/P EST LOW 20 MIN: CPT | Performed by: PHYSICIAN ASSISTANT

## 2023-12-01 ASSESSMENT — PAIN SCALES - GENERAL: PAINLEVEL: NO PAIN (0)

## 2023-12-01 NOTE — LETTER
2023         RE: María Briones  [address] [address] [place] [id number]        Dear Colleague,    Thank you for referring your patient, María Briones, to the [facility]. Please see a copy of my visit note below.    María Briones is an extremely pleasant 43 year old year old female patient here today for evaluation of moles on body. She denies any new or changing nevi.. Patient has no other skin complaints today.  Remainder of the HPI, Meds, PMH, Allergies, FH, and SH was reviewed in chart.    Pertinent Hx:   No personal history of skin cancer.   Past Medical History:   Diagnosis Date     Chickenpox      Female infertility 2014    History of Stage III endometriosis by laparoscopy on 4/15/14  Prior Successful IVF      Incisional hernia, without obstruction or gangrene 10/10/2019     Palpitations 2008     Pes anserinus tendinitis or bursitis 10/24/2012     Urinary tract bacterial infections     as chold       Past Surgical History:   Procedure Laterality Date      SECTION N/A 10/12/2015    Procedure:  SECTION;  Surgeon: Jayshree Garcia MD;  Location: [facility]      SECTION N/A 2018    Procedure:  Section;  Surgeon: Jayshree Garcia MD;  [facility]: [facility]      SECTION N/A 2020    Procedure:  SECTION;  Surgeon: Jayshree Garcia MD;  [facility]: [facility]     HERNIORRHAPHY INCISIONAL (LOCATION) N/A 2019    Procedure: Incisional hernia repair;  Surgeon: Tani Sinclair DO;  [facility]: [facility]     LAPAROSCOPIC ABLATION ENDOMETRIOSIS  4/15/2014    Procedure: LAPAROSCOPIC ABLATION ENDOMETRIOSIS;  Surgeon: Mohamud Osorio MD;  [facility]     LAPAROSCOPIC TUBAL DYE STUDY  4/15/2014    Procedure: LAPAROSCOPIC TUBAL DYE STUDY;  Surgeon: Mohamud Osorio MD;  [facility]     LAPAROSCOPY DIAGNOSTIC (GYN)  4/15/2014    Procedure: Operative Laparoscopy, Excision Cauterization Of Endometriosis, Chromopertubation ;   Surgeon: Mohamud Osorio MD;  [facility]     SALPINGECTOMY Bilateral 9/16/2020    Procedure: SALPINGECTOMY;  Surgeon: Jayshree Garcia MD;  Location: [facility]     [facility] ORAL SURGERY PROCEDURE      wisdom teeth        Family History   Problem Relation Age of Onset     Lipids Father      Hypertension Father      Cardiovascular Father         A fib     Cancer Maternal Grandmother         ovarian      Diabetes Maternal Grandfather      Heart Disease Maternal Grandfather      Cancer Maternal Grandfather         skin     Cardiovascular Maternal Grandfather         MI     Skin Cancer Maternal Grandfather      Thyroid Disease Brother         ?     Cancer Mother         leukemia     Bleeding Disorder Mother         anemia     Depression Mother      Cardiovascular Paternal Grandfather         MI     Genitourinary Problems Paternal Grandmother         kidney removed     Skin Cancer Maternal Aunt        Social History     Socioeconomic History     Marital status:      Spouse name: Not on file     Number of children: Not on file     Years of education: Not on file     Highest education level: Not on file   Occupational History     Not on file   Tobacco Use     Smoking status: Never     Passive exposure: Never     Smokeless tobacco: Never   Vaping Use     Vaping Use: Never used   Substance and Sexual Activity     Alcohol use: Yes     Comment: social     Drug use: No     Sexual activity: Yes     Partners: Male     Birth control/protection: Female Surgical     Comment:  since 2005   Other Topics Concern     Parent/sibling w/ CABG, MI or angioplasty before 65F 55M? No   Social History Narrative     Not on file     Social Determinants of Health     Financial Resource Strain: Low Risk  (9/22/2023)    Financial Resource Strain      Within the past 12 months, have you or your family members you live with been unable to get utilities (heat, electricity) when it was really needed?: No   Food Insecurity: Low Risk   (9/22/2023)    Food Insecurity      Within the past 12 months, did you worry that your food would run out before you got money to buy more?: No      Within the past 12 months, did the food you bought just not last and you didn t have money to get more?: No   Transportation Needs: Low Risk  (9/22/2023)    Transportation Needs      Within the past 12 months, has lack of transportation kept you from medical appointments, getting your medicines, non-medical meetings or appointments, work, or from getting things that you need?: No   Physical Activity: Not on file   Stress: Not on file   Social Connections: Not on file   Interpersonal Safety: Low Risk  (9/22/2023)    Interpersonal Safety      Do you feel physically and emotionally safe where you currently live?: Yes      Within the past 12 months, have you been hit, slapped, kicked or otherwise physically hurt by someone?: No      Within the past 12 months, have you been humiliated or emotionally abused in other ways by your partner or ex-partner?: No   Housing Stability: Low Risk  (9/22/2023)    Housing Stability      Do you have housing? : Yes      Are you worried about losing your housing?: No       Outpatient Encounter Medications as of 12/1/2023   Medication Sig Dispense Refill     albuterol (PROAIR HFA/PROVENTIL HFA/VENTOLIN HFA) 108 (90 Base) MCG/ACT inhaler Inhale 2 puffs into the lungs every 6 hours as needed for shortness of breath, wheezing or cough 18 g 0     No facility-administered encounter medications on file as of 12/1/2023.             O:   NAD, WDWN, Alert & Oriented, Mood & Affect wnl, Vitals stable   Here today alone   There were no vitals taken for this visit.   General appearance normal   Vitals stable   Alert, oriented and in no acute distress     Brown macules on face   Stuck on papules and brown macules on trunk and ext   Red papules on trunk  Brown papules and macules with regular pigment network and borders on torso and extremities   Brown  stuck on papule on left helix    The remainder of skin exam is normal       Eyes: Conjunctivae/lids:Normal     ENT: Lips: normal    MSK:Normal    Cardiovascular: peripheral edema none    Pulm: Breathing Normal    Neuro/Psych: Orientation:Alert and Orientedx3 ; Mood/Affect:normal     A/P:  1. Seborrheic keratosis, lentigo, angioma, benign nevi   BENIGN LESIONS DISCUSSED WITH PATIENT:  I discussed the specifics of tumor, prognosis, and genetics of benign lesions.  I explained that treatment of these lesions would be purely cosmetic and not medically neccessary.  I discussed with patient different removal options including excision, cautery and /or laser.    Nature and genetics of benign skin lesions dicussed with patient.  Signs and Symptoms of skin cancer discussed with patient.  ABCDEs of melanoma reviewed with patient.  Patient encouraged to perform monthly skin exams.  UV precautions reviewed with patient.  Risks of non-melanoma skin cancer discussed with patient   Return to clinic in one year or sooner if needed.       Again, thank you for allowing me to participate in the care of your patient.        Sincerely,        Susan Ballard PA-C

## 2023-12-02 NOTE — PROGRESS NOTES
María Briones is an extremely pleasant 43 year old year old female patient here today for evaluation of moles on body. She denies any new or changing nevi.. Patient has no other skin complaints today.  Remainder of the HPI, Meds, PMH, Allergies, FH, and SH was reviewed in chart.    Pertinent Hx:   No personal history of skin cancer.   Past Medical History:   Diagnosis Date    Chickenpox     Female infertility 2014    History of Stage III endometriosis by laparoscopy on 4/15/14  Prior Successful IVF     Incisional hernia, without obstruction or gangrene 10/10/2019    Palpitations 2008    Pes anserinus tendinitis or bursitis 10/24/2012    Urinary tract bacterial infections     as chold       Past Surgical History:   Procedure Laterality Date     SECTION N/A 10/12/2015    Procedure:  SECTION;  Surgeon: Jayshree Garcia MD;  Location: [facility]     SECTION N/A 2018    Procedure:  Section;  Surgeon: Jayshree Garcia MD;  Location: [facility]     SECTION N/A 2020    Procedure:  SECTION;  Surgeon: Jayshree Garcia MD;  Location: [facility]    HERNIORRHAPHY INCISIONAL (LOCATION) N/A 2019    Procedure: Incisional hernia repair;  Surgeon: Tani Sinclair DO;  Location: [facility]    LAPAROSCOPIC ABLATION ENDOMETRIOSIS  4/15/2014    Procedure: LAPAROSCOPIC ABLATION ENDOMETRIOSIS;  Surgeon: Mohamud Osorio MD;  Location: [facility]    LAPAROSCOPIC TUBAL DYE STUDY  4/15/2014    Procedure: LAPAROSCOPIC TUBAL DYE STUDY;  Surgeon: Mohamud Osorio MD;  Location: [facility]    LAPAROSCOPY DIAGNOSTIC (GYN)  4/15/2014    Procedure: Operative Laparoscopy, Excision Cauterization Of Endometriosis, Chromopertubation ;  Surgeon: Mohamud Osorio MD;  Location: [facility]    SALPINGECTOMY Bilateral 2020    Procedure: SALPINGECTOMY;  Surgeon: Jayshree Garcia MD;  Location: [facility]    [facility] ORAL SURGERY PROCEDURE      wisdom teeth        Family History   Problem Relation Age of Onset     Lipids Father     Hypertension Father     Cardiovascular Father         A fib    Cancer Maternal Grandmother         ovarian     Diabetes Maternal Grandfather     Heart Disease Maternal Grandfather     Cancer Maternal Grandfather         skin    Cardiovascular Maternal Grandfather         MI    Skin Cancer Maternal Grandfather     Thyroid Disease Brother         ?    Cancer Mother         leukemia    Bleeding Disorder Mother         anemia    Depression Mother     Cardiovascular Paternal Grandfather         MI    Genitourinary Problems Paternal Grandmother         kidney removed    Skin Cancer Maternal Aunt        Social History     Socioeconomic History    Marital status:      Spouse name: Not on file    Number of children: Not on file    Years of education: Not on file    Highest education level: Not on file   Occupational History    Not on file   Tobacco Use    Smoking status: Never     Passive exposure: Never    Smokeless tobacco: Never   Vaping Use    Vaping Use: Never used   Substance and Sexual Activity    Alcohol use: Yes     Comment: social    Drug use: No    Sexual activity: Yes     Partners: Male     Birth control/protection: Female Surgical     Comment:  since 2005   Other Topics Concern    Parent/sibling w/ CABG, MI or angioplasty before 65F 55M? No   Social History Narrative    Not on file     Social Determinants of Health     Financial Resource Strain: Low Risk  (9/22/2023)    Financial Resource Strain     Within the past 12 months, have you or your family members you live with been unable to get utilities (heat, electricity) when it was really needed?: No   Food Insecurity: Low Risk  (9/22/2023)    Food Insecurity     Within the past 12 months, did you worry that your food would run out before you got money to buy more?: No     Within the past 12 months, did the food you bought just not last and you didn t have money to get more?: No   Transportation Needs: Low Risk  (9/22/2023)     Transportation Needs     Within the past 12 months, has lack of transportation kept you from medical appointments, getting your medicines, non-medical meetings or appointments, work, or from getting things that you need?: No   Physical Activity: Not on file   Stress: Not on file   Social Connections: Not on file   Interpersonal Safety: Low Risk  (9/22/2023)    Interpersonal Safety     Do you feel physically and emotionally safe where you currently live?: Yes     Within the past 12 months, have you been hit, slapped, kicked or otherwise physically hurt by someone?: No     Within the past 12 months, have you been humiliated or emotionally abused in other ways by your partner or ex-partner?: No   Housing Stability: Low Risk  (9/22/2023)    Housing Stability     Do you have housing? : Yes     Are you worried about losing your housing?: No       Outpatient Encounter Medications as of 12/1/2023   Medication Sig Dispense Refill    albuterol (PROAIR HFA/PROVENTIL HFA/VENTOLIN HFA) 108 (90 Base) MCG/ACT inhaler Inhale 2 puffs into the lungs every 6 hours as needed for shortness of breath, wheezing or cough 18 g 0     No facility-administered encounter medications on file as of 12/1/2023.             O:   NAD, WDWN, Alert & Oriented, Mood & Affect wnl, Vitals stable   Here today alone   There were no vitals taken for this visit.   General appearance normal   Vitals stable   Alert, oriented and in no acute distress     Brown macules on face   Stuck on papules and brown macules on trunk and ext   Red papules on trunk  Brown papules and macules with regular pigment network and borders on torso and extremities   Brown stuck on papule on left helix    The remainder of skin exam is normal       Eyes: Conjunctivae/lids:Normal     ENT: Lips: normal    MSK:Normal    Cardiovascular: peripheral edema none    Pulm: Breathing Normal    Neuro/Psych: Orientation:Alert and Orientedx3 ; Mood/Affect:normal     A/P:  1. Seborrheic keratosis,  lentigo, angioma, benign nevi   BENIGN LESIONS DISCUSSED WITH PATIENT:  I discussed the specifics of tumor, prognosis, and genetics of benign lesions.  I explained that treatment of these lesions would be purely cosmetic and not medically neccessary.  I discussed with patient different removal options including excision, cautery and /or laser.    Nature and genetics of benign skin lesions dicussed with patient.  Signs and Symptoms of skin cancer discussed with patient.  ABCDEs of melanoma reviewed with patient.  Patient encouraged to perform monthly skin exams.  UV precautions reviewed with patient.  Risks of non-melanoma skin cancer discussed with patient   Return to clinic in one year or sooner if needed.

## 2024-04-19 ENCOUNTER — HOSPITAL ENCOUNTER (OUTPATIENT)
Dept: MAMMOGRAPHY | Facility: CLINIC | Age: 44
Discharge: HOME | End: 2024-04-19
Admitting: FAMILY MEDICINE
Payer: COMMERCIAL

## 2024-04-19 DIAGNOSIS — Z12.31 VISIT FOR SCREENING MAMMOGRAM: ICD-10-CM

## 2024-04-19 PROCEDURE — 77063 BREAST TOMOSYNTHESIS BI: CPT

## 2024-10-25 ENCOUNTER — OFFICE VISIT (OUTPATIENT)
Dept: FAMILY MEDICINE | Facility: CLINIC | Age: 44
End: 2024-10-25
Payer: COMMERCIAL

## 2024-10-25 VITALS
TEMPERATURE: 97 F | BODY MASS INDEX: 20.2 KG/M2 | HEIGHT: 63 IN | WEIGHT: 114 LBS | DIASTOLIC BLOOD PRESSURE: 74 MMHG | SYSTOLIC BLOOD PRESSURE: 112 MMHG | HEART RATE: 69 BPM | OXYGEN SATURATION: 100 % | RESPIRATION RATE: 16 BRPM

## 2024-10-25 DIAGNOSIS — Z13.1 SCREENING FOR DIABETES MELLITUS: ICD-10-CM

## 2024-10-25 DIAGNOSIS — Z13.6 CARDIOVASCULAR SCREENING; LDL GOAL LESS THAN 130: ICD-10-CM

## 2024-10-25 DIAGNOSIS — Z00.00 ROUTINE PHYSICAL EXAMINATION: Primary | ICD-10-CM

## 2024-10-25 PROBLEM — R42 VERTIGO: Status: RESOLVED | Noted: 2023-08-24 | Resolved: 2024-10-25

## 2024-10-25 PROBLEM — E03.8 OTHER SPECIFIED HYPOTHYROIDISM: Status: RESOLVED | Noted: 2018-03-30 | Resolved: 2024-10-25

## 2024-10-25 LAB
CHOLEST SERPL-MCNC: 180 MG/DL
FASTING STATUS PATIENT QL REPORTED: YES
FASTING STATUS PATIENT QL REPORTED: YES
GLUCOSE SERPL-MCNC: 91 MG/DL (ref 70–99)
HDLC SERPL-MCNC: 65 MG/DL
LDLC SERPL CALC-MCNC: 105 MG/DL
NONHDLC SERPL-MCNC: 115 MG/DL
TRIGL SERPL-MCNC: 51 MG/DL

## 2024-10-25 PROCEDURE — 90480 ADMN SARSCOV2 VAC 1/ONLY CMP: CPT | Performed by: NURSE PRACTITIONER

## 2024-10-25 PROCEDURE — 90471 IMMUNIZATION ADMIN: CPT | Performed by: NURSE PRACTITIONER

## 2024-10-25 PROCEDURE — 90656 IIV3 VACC NO PRSV 0.5 ML IM: CPT | Performed by: NURSE PRACTITIONER

## 2024-10-25 PROCEDURE — 80061 LIPID PANEL: CPT | Performed by: NURSE PRACTITIONER

## 2024-10-25 PROCEDURE — 36415 COLL VENOUS BLD VENIPUNCTURE: CPT | Performed by: NURSE PRACTITIONER

## 2024-10-25 PROCEDURE — 82947 ASSAY GLUCOSE BLOOD QUANT: CPT | Performed by: NURSE PRACTITIONER

## 2024-10-25 PROCEDURE — 99396 PREV VISIT EST AGE 40-64: CPT | Mod: 25 | Performed by: NURSE PRACTITIONER

## 2024-10-25 PROCEDURE — 91320 SARSCV2 VAC 30MCG TRS-SUC IM: CPT | Performed by: NURSE PRACTITIONER

## 2024-10-25 SDOH — HEALTH STABILITY: PHYSICAL HEALTH: ON AVERAGE, HOW MANY DAYS PER WEEK DO YOU ENGAGE IN MODERATE TO STRENUOUS EXERCISE (LIKE A BRISK WALK)?: 5 DAYS

## 2024-10-25 ASSESSMENT — SOCIAL DETERMINANTS OF HEALTH (SDOH): HOW OFTEN DO YOU GET TOGETHER WITH FRIENDS OR RELATIVES?: ONCE A WEEK

## 2024-10-25 NOTE — PROGRESS NOTES
Preventive Care Visit  [facility]  RANDELL Vaughan CNP, Family Medicine  Oct 25, 2024      Assessment & Plan     Routine physical examination      CARDIOVASCULAR SCREENING; LDL GOAL LESS THAN 130    - Lipid panel reflex to direct LDL Fasting; Future    Screening for diabetes mellitus    - Glucose; Future      Counseling  Appropriate preventive services were addressed with this patient via screening, questionnaire, or discussion as appropriate for fall prevention, nutrition, physical activity, Tobacco-use cessation, social engagement, weight loss and cognition.  Checklist reviewing preventive services available has been given to the patient.  Reviewed patient's diet, addressing concerns and/or questions.       FUTURE APPOINTMENTS:       - Follow-up for annual visit or as needed    See Patient Instructions    Subjective   María is a 44 year old, presenting for the following:  Physical        10/25/2024     8:26 AM   Additional Questions   Roomed by Danna COSTA        Health Care Directive  Patient does not have a Health Care Directive: Discussed advance care planning with patient; however, patient declined at this time.      10/25/2024   General Health   How would you rate your overall physical health? Good   Feel stress (tense, anxious, or unable to sleep) Not at all            10/25/2024   Nutrition   Three or more servings of calcium each day? Yes   Diet: Regular (no restrictions)   How many servings of fruit and vegetables per day? (!) 2-3   How many sweetened beverages each day? 0-1            10/25/2024   Exercise   Days per week of moderate/strenous exercise 5 days            10/25/2024   Social Factors   Frequency of gathering with friends or relatives Once a week   Worry food won't last until get money to buy more No   Food not last or not have enough money for food? No   Do you have housing? (Housing is defined as stable permanent housing and does not include  staying ouside in a car, in a tent, in an abandoned building, in an overnight shelter, or couch-surfing.) Yes   Are you worried about losing your housing? No   Lack of transportation? No   Unable to get utilities (heat,electricity)? No            10/25/2024   Dental   Dentist two times every year? Yes            10/25/2024   TB Screening   Were you born outside of the [place]? No            Today's PHQ-2 Score:       10/25/2024     8:24 AM   PHQ-2 ( 1999 [employer])   Q1: Little interest or pleasure in doing things 0    Q2: Feeling down, depressed or hopeless 0    PHQ-2 Score 0    Q1: Little interest or pleasure in doing things Not at all   Q2: Feeling down, depressed or hopeless Not at all   PHQ-2 Score 0       Patient-reported           10/25/2024   Substance Use   Alcohol more than 3/day or more than 7/wk No   Do you use any other substances recreationally? No        Social History     Tobacco Use    Smoking status: Never     Passive exposure: Never    Smokeless tobacco: Never   Vaping Use    Vaping status: Never Used   Substance Use Topics    Alcohol use: Yes     Comment: social    Drug use: No           4/19/2024   LAST FHS-7 RESULTS   1st degree relative breast or ovarian cancer No   Any relative bilateral breast cancer No   Any male have breast cancer No   Any ONE woman have BOTH breast AND ovarian cancer No   Any woman with breast cancer before 50yrs No   2 or more relatives with breast AND/OR ovarian cancer Yes   2 or more relatives with breast AND/OR bowel cancer Yes           Mammogram Screening - Mammogram every 1-2 years updated in Health Maintenance based on mutual decision making        10/25/2024   STI Screening   New sexual partner(s) since last STI/HIV test? No        History of abnormal Pap smear: No - age 30- 64 PAP with HPV every 5 years recommended        Latest Ref Rng & Units 1/3/2020     3:33 PM 10/24/2016     9:25 AM 10/24/2016    12:00 AM   PAP / HPV   PAP (Historical)  NIL   NIL    HPV 16 DNA  "NEG^Negative Negative  Negative     HPV 18 DNA NEG^Negative Negative  Negative     Other HR HPV NEG^Negative Negative  Negative       ASCVD Risk   The 10-year ASCVD risk score (Anais RIVERS, et al., 2019) is: 0.3%    Values used to calculate the score:      Age: 44 years      Sex: Female      Is Non- : No      Diabetic: No      Tobacco smoker: No      Systolic Blood Pressure: 112 mmHg      Is BP treated: No      HDL Cholesterol: 59 mg/dL      Total Cholesterol: 150 mg/dL        10/25/2024   Contraception/Family Planning   Questions about contraception or family planning No           Reviewed and updated as needed this visit by Provider                    Labs reviewed in EPIC  BP Readings from Last 3 Encounters:   10/25/24 112/74   09/22/23 100/74   08/10/23 114/72    Wt Readings from Last 3 Encounters:   10/25/24 51.7 kg (114 lb)   09/22/23 52.2 kg (115 lb)   08/10/23 53.1 kg (117 lb)                      Review of Systems  Constitutional, HEENT, cardiovascular, pulmonary, gi and gu systems are negative, except as otherwise noted.     Objective    Exam  /74   Pulse 69   Temp 97  F (36.1  C) (Tympanic)   Resp 16   Ht 1.607 m (5' 3.25\")   Wt 51.7 kg (114 lb)   LMP 10/11/2024   SpO2 100%   BMI 20.03 kg/m     Estimated body mass index is 20.03 kg/m  as calculated from the following:    Height as of this encounter: 1.607 m (5' 3.25\").    Weight as of this encounter: 51.7 kg (114 lb).    Physical Exam  GENERAL: alert and no distress  EYES: Eyes grossly normal to inspection and conjunctivae and sclerae normal  HENT: normal cephalic/atraumatic, ear canals and TM's normal, oropharynx clear, and oral mucous membranes moist  NECK: no adenopathy, no asymmetry, masses, or scars  RESP: lungs clear to auscultation - no rales, rhonchi or wheezes  CV: regular rate and rhythm, normal S1 S2, no S3 or S4, no murmur, click or rub, no peripheral edema  ABDOMEN: soft, nontender and no palpable " or pulsatile masses  MS: no gross musculoskeletal defects noted, no edema  SKIN: no suspicious lesions or rashes  NEURO: Normal strength and tone, mentation intact and speech normal  PSYCH: mentation appears normal, affect normal/bright        Signed Electronically by: RANDELL Vaughan CNP

## 2024-10-27 NOTE — RESULT ENCOUNTER NOTE
"Anthonywisam María    Your lab results came back within normal/acceptable limits. Please let us know if you have any questions.     Take care,    RANDELL Thurston CNP    TEST DESCRIPTIONS   CBC (Complete Blood Count) includes hemoglobin, hematocrit, white blood cells, etc. This test can be used to detect anemia, infection, and abnormalities in blood cells.   Cholesterol is one of the blood fats (lipids) and is the building block used by the body for cell wall and hormone production. Increased levels of cholesterol have been proven to directly contribute to heart disease and strokes.   Triglycerides are one of the blood fats (lipids) and are thought to be associated with heart disease. If you have had anything to eat or drink other than water for 12 hours before the test and your level is high, you should have the test repeated after a 12 hour fast. Abnormally high results may also be associated with diabetes, kidney and liver diseases.   LDL is the low density lipoprotein component of the cholesterol. It is the harmful substance that deposits cholesterol on the artery walls contributing to heart attacks and strokes. A high LDL level is associated with higher risk of coronary heart disease.   HDL stands for high density lipoprotein and refers to the so-called \"good\" cholesterol. HDL picks up excess cholesterol in the bloodstream and carries it back to the liver for disposal. Individuals with higher than average HDL seem to have a lower risk of coronary disease. Vigorous exercise will help increase the blood levels of HDL.   Risk Factor (Total cholesterol/HDL) is a commonly used ratio for cardiac risk assessment. Less than 5.0 for men and 4.4 for women is ideal.   Sodium is an electrolyte useful in diagnosis of dehydration, diabetes, hypertension, or other diseases involving electrolyte imbalance. It also preserves the balance between calcium and potassium to maintain normal heart action and equilibrium of the body. "   Potassium is also an electrolyte that works with sodium to regulate the body's water balance and normalize heart rhythm.   Glucose is a measure of blood sugar and is one of the tests for diabetes. If you have not been fasting, your level will often be high. A low glucose level may be a cause of weakness or dizziness. Blood sugar ranks with cholesterol as a causative factor in arteriosclerosis and heart attacks.   BUN & Creatinine are waste products excreted by the kidneys. A high BUN can be related to a high protein diet, heavy exercise, fever and infections, dehydration, kidney stones, and kidney disease. Creatinine elevation is less dependent on diet or exercise and better represents kidney impairment. Low values are not generally significant.   Calcium is a mineral in the blood controlled by the parathyroid glands and kidneys. It is important in the formation of bone, in muscle and nerve function, and in blood clotting. Disease of the parathyroid gland, diseased bones or kidneys, or defective absorption of calcium may cause abnormal levels from the intestine.   ALT, AST, Alk Phos are liver tests. Enzymes found in the liver as well as skeletal and cardiac muscle. Elevations can often be seen in alcoholism, liver or heart disease. Slightly abnormal values are not considered significant.   TSH stands for Thyroid Stimulating Hormone. A sensitive test used to determine how the thyroid gland is functioning. The thyroid gland produces hormones that control the body's metabolism. When too few hormones are produced (increased TSH), hypothyroidism occurs which can cause fatigue, sensitivity to cold, and weight gain - an overall slowing down of bodily functions. When too many thyroid hormones are produces (or decreased TSH), it creates a condition call hyperthyroidism (such as Graves' disease) which causes rapid heartbeat, weight loss, and dizziness, among other symptoms.   PSA stands for Prostate Specific Antigen.  Elevated levels of PSA can increase with trauma, infection, inflammation, or disease processes in the prostate such as BPH (Benigh Prostatic Hypertrophy) or cancer.   Glycohemoglobin or HgBA1C is a 3-month average of blood sugar

## 2025-04-22 ENCOUNTER — HOSPITAL ENCOUNTER (OUTPATIENT)
Dept: MAMMOGRAPHY | Facility: CLINIC | Age: 45
Discharge: HOME | End: 2025-04-22
Admitting: NURSE PRACTITIONER
Payer: COMMERCIAL

## 2025-04-22 DIAGNOSIS — Z12.31 VISIT FOR SCREENING MAMMOGRAM: ICD-10-CM

## 2025-04-22 PROCEDURE — 77063 BREAST TOMOSYNTHESIS BI: CPT

## (undated) DEVICE — SU ETHIBOND 0 MO-7 CR 8X18" CX41D

## (undated) DEVICE — SOL WATER IRRIG 1000ML BOTTLE 07139-09

## (undated) DEVICE — GLOVE PROTEXIS W/NEU-THERA 6.0  2D73TE60

## (undated) DEVICE — GLOVE PROTEXIS W/NEU-THERA 6.5  2D73TE65

## (undated) DEVICE — SOL NACL 0.9% IRRIG 1000ML BOTTLE 07138-09

## (undated) DEVICE — DRSG TELFA 3X8" 1238

## (undated) DEVICE — PACK C-SECTION LF PL15OTA83B

## (undated) DEVICE — ADH FLOSEAL W/HUMAN THROMBIN 5ML

## (undated) DEVICE — SUCTION TIP POOLE K770

## (undated) DEVICE — SU MONOCRYL 0 CT-1 27" Y340H

## (undated) DEVICE — SU WND CLOSURE VLOC 90 ABS 4-0 18" P-12 VLOCM0023

## (undated) DEVICE — LINEN BABY BLANKET 5434

## (undated) DEVICE — SU PLAIN 3-0 CT 27" 852H

## (undated) DEVICE — NDL 22GA 1.5"

## (undated) DEVICE — BLADE KNIFE SURG 15 371115

## (undated) DEVICE — PREP CHLORAPREP 26ML TINTED ORANGE  260815

## (undated) DEVICE — SU VICRYL 2-0 CT-1 36" UND J945H

## (undated) DEVICE — ADHESIVE SWIFTSET 0.8ML OCTYL SS6

## (undated) DEVICE — STPL SKIN 35W 6.9MM  PXW35

## (undated) DEVICE — Device

## (undated) DEVICE — CATH TRAY FOLEY SURESTEP 16FR W/URINE MTR STATLK LF A303416A

## (undated) DEVICE — GLOVE PROTEXIS W/NEU-THERA 7.5  2D73TE75

## (undated) DEVICE — SU MONOCRYL 4-0 PS-2 18" UND Y496G

## (undated) DEVICE — SU VICRYL 3-0 SH 27" UND J416H

## (undated) DEVICE — SOL NACL 0.9% IRRIG 1000ML BOTTLE 2F7124

## (undated) DEVICE — BLANKET BAIR HUGGER UPPER BODY 42268

## (undated) DEVICE — STOCKING SLEEVE COMPRESSION CALF MED

## (undated) DEVICE — PACK LAPAROTOMY CUSTOM LAKES

## (undated) DEVICE — STOCKING SLEEVE COMPRESSION CALF LG

## (undated) DEVICE — SYR 10ML FINGER CONTROL W/O NDL 309695

## (undated) DEVICE — BLADE KNIFE SURG 10 371110

## (undated) DEVICE — ADH SKIN CLOSURE PREMIERPRO EXOFIN 1.0ML 3470

## (undated) DEVICE — SU VICRYL 0 CT-1 36" J946H

## (undated) DEVICE — TAPE MEDIPORE 4"X10YD 2964

## (undated) DEVICE — DRAIN PENROSE 3/4X1/2X18" LATEX 8888515205

## (undated) DEVICE — GLOVE PROTEXIS BLUE W/NEU-THERA 6.0  2D73EB60

## (undated) DEVICE — SYR BULB IRRIG DOVER 60 ML LATEX FREE 67000

## (undated) DEVICE — LABEL MEDICATION SYSTEM  3304

## (undated) DEVICE — DECANTER VIAL 2006S

## (undated) DEVICE — SUCTION TIP YANKAUER STR K87

## (undated) DEVICE — GOWN XLG DISP 9545

## (undated) DEVICE — SPONGE LAP 18X18" 1515

## (undated) DEVICE — GLOVE PROTEXIS MICRO 5.5  2D73PM55

## (undated) DEVICE — DRSG ABDOMINAL 07 1/2X8" 7197D

## (undated) DEVICE — TUBING SUCTION MEDI-VAC 1/4"X20' N620A

## (undated) DEVICE — GLOVE PROTEXIS BLUE W/NEU-THERA 6.5  2D73EB65

## (undated) DEVICE — GLOVE PROTEXIS BLUE W/NEU-THERA 7.0  2D73EB70

## (undated) DEVICE — SOL WATER IRRIG 1000ML BOTTLE 2F7114

## (undated) DEVICE — GLOVE PROTEXIS BLUE W/NEU-THERA 8.0  2D73EB80

## (undated) RX ORDER — PHENYLEPHRINE HCL IN 0.9% NACL 1 MG/10 ML
SYRINGE (ML) INTRAVENOUS
Status: DISPENSED
Start: 2018-11-07

## (undated) RX ORDER — DEXAMETHASONE SODIUM PHOSPHATE 4 MG/ML
INJECTION, SOLUTION INTRA-ARTICULAR; INTRALESIONAL; INTRAMUSCULAR; INTRAVENOUS; SOFT TISSUE
Status: DISPENSED
Start: 2019-11-18

## (undated) RX ORDER — PROPOFOL 10 MG/ML
INJECTION, EMULSION INTRAVENOUS
Status: DISPENSED
Start: 2019-11-18

## (undated) RX ORDER — OXYTOCIN/0.9 % SODIUM CHLORIDE 30/500 ML
PLASTIC BAG, INJECTION (ML) INTRAVENOUS
Status: DISPENSED
Start: 2020-09-16

## (undated) RX ORDER — FENTANYL CITRATE 50 UG/ML
INJECTION, SOLUTION INTRAMUSCULAR; INTRAVENOUS
Status: DISPENSED
Start: 2019-11-18

## (undated) RX ORDER — FENTANYL CITRATE 50 UG/ML
INJECTION, SOLUTION INTRAMUSCULAR; INTRAVENOUS
Status: DISPENSED
Start: 2020-09-16

## (undated) RX ORDER — MORPHINE SULFATE 1 MG/ML
INJECTION, SOLUTION EPIDURAL; INTRATHECAL; INTRAVENOUS
Status: DISPENSED
Start: 2020-09-16

## (undated) RX ORDER — LIDOCAINE HYDROCHLORIDE AND EPINEPHRINE 10; 10 MG/ML; UG/ML
INJECTION, SOLUTION INFILTRATION; PERINEURAL
Status: DISPENSED
Start: 2019-11-18

## (undated) RX ORDER — DEXAMETHASONE SODIUM PHOSPHATE 4 MG/ML
INJECTION, SOLUTION INTRA-ARTICULAR; INTRALESIONAL; INTRAMUSCULAR; INTRAVENOUS; SOFT TISSUE
Status: DISPENSED
Start: 2018-11-07

## (undated) RX ORDER — BUPIVACAINE HYDROCHLORIDE 5 MG/ML
INJECTION, SOLUTION PERINEURAL
Status: DISPENSED
Start: 2019-11-18

## (undated) RX ORDER — KETOROLAC TROMETHAMINE 30 MG/ML
INJECTION, SOLUTION INTRAMUSCULAR; INTRAVENOUS
Status: DISPENSED
Start: 2019-11-18

## (undated) RX ORDER — MORPHINE SULFATE 1 MG/ML
INJECTION, SOLUTION EPIDURAL; INTRATHECAL; INTRAVENOUS
Status: DISPENSED
Start: 2018-11-07

## (undated) RX ORDER — OXYTOCIN/0.9 % SODIUM CHLORIDE 30/500 ML
PLASTIC BAG, INJECTION (ML) INTRAVENOUS
Status: DISPENSED
Start: 2018-11-07

## (undated) RX ORDER — LIDOCAINE HYDROCHLORIDE 10 MG/ML
INJECTION, SOLUTION EPIDURAL; INFILTRATION; INTRACAUDAL; PERINEURAL
Status: DISPENSED
Start: 2019-11-18

## (undated) RX ORDER — OXYTOCIN 10 [USP'U]/ML
INJECTION, SOLUTION INTRAMUSCULAR; INTRAVENOUS
Status: DISPENSED
Start: 2020-09-16

## (undated) RX ORDER — FENTANYL CITRATE-0.9 % NACL/PF 10 MCG/ML
PLASTIC BAG, INJECTION (ML) INTRAVENOUS
Status: DISPENSED
Start: 2020-09-16

## (undated) RX ORDER — ONDANSETRON 2 MG/ML
INJECTION INTRAMUSCULAR; INTRAVENOUS
Status: DISPENSED
Start: 2018-11-07

## (undated) RX ORDER — ONDANSETRON 2 MG/ML
INJECTION INTRAMUSCULAR; INTRAVENOUS
Status: DISPENSED
Start: 2020-09-16

## (undated) RX ORDER — ONDANSETRON 2 MG/ML
INJECTION INTRAMUSCULAR; INTRAVENOUS
Status: DISPENSED
Start: 2019-11-18

## (undated) RX ORDER — BUPIVACAINE HYDROCHLORIDE 7.5 MG/ML
INJECTION, SOLUTION INTRASPINAL
Status: DISPENSED
Start: 2018-11-07

## (undated) RX ORDER — CEFAZOLIN SODIUM 2 G/100ML
INJECTION, SOLUTION INTRAVENOUS
Status: DISPENSED
Start: 2019-11-18